# Patient Record
Sex: MALE | Race: WHITE | HISPANIC OR LATINO | Employment: FULL TIME | URBAN - METROPOLITAN AREA
[De-identification: names, ages, dates, MRNs, and addresses within clinical notes are randomized per-mention and may not be internally consistent; named-entity substitution may affect disease eponyms.]

---

## 2018-03-05 ENCOUNTER — HOSPITAL ENCOUNTER (INPATIENT)
Facility: CLINIC | Age: 58
LOS: 2 days | Discharge: HOME | End: 2018-03-07
Attending: EMERGENCY MEDICINE | Admitting: INTERNAL MEDICINE
Payer: COMMERCIAL

## 2018-03-05 ENCOUNTER — APPOINTMENT (OUTPATIENT)
Dept: CT IMAGING | Facility: CLINIC | Age: 58
End: 2018-03-05
Attending: EMERGENCY MEDICINE
Payer: COMMERCIAL

## 2018-03-05 DIAGNOSIS — K57.20 DIVERTICULITIS OF LARGE INTESTINE WITH PERFORATION, UNSPECIFIED BLEEDING STATUS: ICD-10-CM

## 2018-03-05 PROBLEM — K57.92 DIVERTICULITIS: Status: ACTIVE | Noted: 2018-03-05

## 2018-03-05 PROBLEM — A41.9 SEPSIS (H): Status: ACTIVE | Noted: 2018-03-05

## 2018-03-05 LAB
ALBUMIN SERPL-MCNC: 3.7 G/DL (ref 3.4–5)
ALP SERPL-CCNC: 67 U/L (ref 40–150)
ALT SERPL W P-5'-P-CCNC: 26 U/L (ref 0–70)
ANION GAP SERPL CALCULATED.3IONS-SCNC: 5 MMOL/L (ref 3–14)
AST SERPL W P-5'-P-CCNC: 19 U/L (ref 0–45)
BASOPHILS # BLD AUTO: 0 10E9/L (ref 0–0.2)
BASOPHILS NFR BLD AUTO: 0.2 %
BILIRUB SERPL-MCNC: 0.6 MG/DL (ref 0.2–1.3)
BUN SERPL-MCNC: 16 MG/DL (ref 7–30)
CALCIUM SERPL-MCNC: 8.7 MG/DL (ref 8.5–10.1)
CHLORIDE SERPL-SCNC: 104 MMOL/L (ref 94–109)
CO2 SERPL-SCNC: 28 MMOL/L (ref 20–32)
CREAT SERPL-MCNC: 0.76 MG/DL (ref 0.66–1.25)
DIFFERENTIAL METHOD BLD: ABNORMAL
EOSINOPHIL # BLD AUTO: 0.1 10E9/L (ref 0–0.7)
EOSINOPHIL NFR BLD AUTO: 0.8 %
ERYTHROCYTE [DISTWIDTH] IN BLOOD BY AUTOMATED COUNT: 12.8 % (ref 10–15)
GFR SERPL CREATININE-BSD FRML MDRD: >90 ML/MIN/1.7M2
GLUCOSE SERPL-MCNC: 104 MG/DL (ref 70–99)
HCT VFR BLD AUTO: 46.1 % (ref 40–53)
HGB BLD-MCNC: 15.7 G/DL (ref 13.3–17.7)
IMM GRANULOCYTES # BLD: 0.1 10E9/L (ref 0–0.4)
IMM GRANULOCYTES NFR BLD: 0.3 %
LIPASE SERPL-CCNC: 208 U/L (ref 73–393)
LYMPHOCYTES # BLD AUTO: 2.5 10E9/L (ref 0.8–5.3)
LYMPHOCYTES NFR BLD AUTO: 16.3 %
MCH RBC QN AUTO: 28.7 PG (ref 26.5–33)
MCHC RBC AUTO-ENTMCNC: 34.1 G/DL (ref 31.5–36.5)
MCV RBC AUTO: 84 FL (ref 78–100)
MONOCYTES # BLD AUTO: 1.7 10E9/L (ref 0–1.3)
MONOCYTES NFR BLD AUTO: 11.2 %
NEUTROPHILS # BLD AUTO: 10.7 10E9/L (ref 1.6–8.3)
NEUTROPHILS NFR BLD AUTO: 71.2 %
PLATELET # BLD AUTO: 323 10E9/L (ref 150–450)
POTASSIUM SERPL-SCNC: 3.6 MMOL/L (ref 3.4–5.3)
PROT SERPL-MCNC: 7.6 G/DL (ref 6.8–8.8)
RBC # BLD AUTO: 5.47 10E12/L (ref 4.4–5.9)
SODIUM SERPL-SCNC: 137 MMOL/L (ref 133–144)
WBC # BLD AUTO: 15 10E9/L (ref 4–11)

## 2018-03-05 PROCEDURE — 99285 EMERGENCY DEPT VISIT HI MDM: CPT | Mod: 25

## 2018-03-05 PROCEDURE — 80053 COMPREHEN METABOLIC PANEL: CPT | Performed by: EMERGENCY MEDICINE

## 2018-03-05 PROCEDURE — 25000125 ZZHC RX 250: Performed by: EMERGENCY MEDICINE

## 2018-03-05 PROCEDURE — 96374 THER/PROPH/DIAG INJ IV PUSH: CPT | Mod: 59

## 2018-03-05 PROCEDURE — 12000000 ZZH R&B MED SURG/OB

## 2018-03-05 PROCEDURE — 99222 1ST HOSP IP/OBS MODERATE 55: CPT | Mod: AI | Performed by: INTERNAL MEDICINE

## 2018-03-05 PROCEDURE — 25000128 H RX IP 250 OP 636: Performed by: INTERNAL MEDICINE

## 2018-03-05 PROCEDURE — 99207 ZZC CDG-MDM COMPONENT: MEETS LOW - DOWN CODED: CPT | Performed by: INTERNAL MEDICINE

## 2018-03-05 PROCEDURE — 25000128 H RX IP 250 OP 636: Performed by: EMERGENCY MEDICINE

## 2018-03-05 PROCEDURE — 85025 COMPLETE CBC W/AUTO DIFF WBC: CPT | Performed by: EMERGENCY MEDICINE

## 2018-03-05 PROCEDURE — 83690 ASSAY OF LIPASE: CPT | Performed by: EMERGENCY MEDICINE

## 2018-03-05 PROCEDURE — 74177 CT ABD & PELVIS W/CONTRAST: CPT

## 2018-03-05 RX ORDER — ACETAMINOPHEN 325 MG/1
650 TABLET ORAL EVERY 4 HOURS PRN
Status: DISCONTINUED | OUTPATIENT
Start: 2018-03-05 | End: 2018-03-07 | Stop reason: HOSPADM

## 2018-03-05 RX ORDER — OXYCODONE AND ACETAMINOPHEN 5; 325 MG/1; MG/1
1-2 TABLET ORAL EVERY 4 HOURS PRN
Status: DISCONTINUED | OUTPATIENT
Start: 2018-03-05 | End: 2018-03-07 | Stop reason: HOSPADM

## 2018-03-05 RX ORDER — IBUPROFEN 600 MG/1
600 TABLET, FILM COATED ORAL 3 TIMES DAILY PRN
Status: DISCONTINUED | OUTPATIENT
Start: 2018-03-05 | End: 2018-03-07 | Stop reason: HOSPADM

## 2018-03-05 RX ORDER — SODIUM CHLORIDE 9 MG/ML
INJECTION, SOLUTION INTRAVENOUS CONTINUOUS
Status: DISCONTINUED | OUTPATIENT
Start: 2018-03-05 | End: 2018-03-07 | Stop reason: HOSPADM

## 2018-03-05 RX ORDER — ONDANSETRON 2 MG/ML
4 INJECTION INTRAMUSCULAR; INTRAVENOUS EVERY 6 HOURS PRN
Status: DISCONTINUED | OUTPATIENT
Start: 2018-03-05 | End: 2018-03-07 | Stop reason: HOSPADM

## 2018-03-05 RX ORDER — ROSUVASTATIN CALCIUM 10 MG/1
10 TABLET, COATED ORAL DAILY
Status: DISCONTINUED | OUTPATIENT
Start: 2018-03-07 | End: 2018-03-07 | Stop reason: HOSPADM

## 2018-03-05 RX ORDER — ONDANSETRON 4 MG/1
4 TABLET, ORALLY DISINTEGRATING ORAL EVERY 6 HOURS PRN
Status: DISCONTINUED | OUTPATIENT
Start: 2018-03-05 | End: 2018-03-07 | Stop reason: HOSPADM

## 2018-03-05 RX ORDER — IOPAMIDOL 755 MG/ML
101 INJECTION, SOLUTION INTRAVASCULAR ONCE
Status: COMPLETED | OUTPATIENT
Start: 2018-03-05 | End: 2018-03-05

## 2018-03-05 RX ORDER — DOCUSATE SODIUM 100 MG/1
100 CAPSULE, LIQUID FILLED ORAL 2 TIMES DAILY
Status: DISCONTINUED | OUTPATIENT
Start: 2018-03-06 | End: 2018-03-07 | Stop reason: HOSPADM

## 2018-03-05 RX ORDER — PIPERACILLIN SODIUM, TAZOBACTAM SODIUM 3; .375 G/15ML; G/15ML
3.38 INJECTION, POWDER, LYOPHILIZED, FOR SOLUTION INTRAVENOUS EVERY 6 HOURS
Status: DISCONTINUED | OUTPATIENT
Start: 2018-03-06 | End: 2018-03-07 | Stop reason: HOSPADM

## 2018-03-05 RX ORDER — PIPERACILLIN SODIUM, TAZOBACTAM SODIUM 3; .375 G/15ML; G/15ML
3.38 INJECTION, POWDER, LYOPHILIZED, FOR SOLUTION INTRAVENOUS ONCE
Status: COMPLETED | OUTPATIENT
Start: 2018-03-05 | End: 2018-03-05

## 2018-03-05 RX ORDER — NALOXONE HYDROCHLORIDE 0.4 MG/ML
.1-.4 INJECTION, SOLUTION INTRAMUSCULAR; INTRAVENOUS; SUBCUTANEOUS
Status: DISCONTINUED | OUTPATIENT
Start: 2018-03-05 | End: 2018-03-07 | Stop reason: HOSPADM

## 2018-03-05 RX ORDER — MORPHINE SULFATE 2 MG/ML
2-4 INJECTION, SOLUTION INTRAMUSCULAR; INTRAVENOUS
Status: DISCONTINUED | OUTPATIENT
Start: 2018-03-05 | End: 2018-03-07 | Stop reason: HOSPADM

## 2018-03-05 RX ORDER — LISINOPRIL 10 MG/1
10 TABLET ORAL DAILY
Status: DISCONTINUED | OUTPATIENT
Start: 2018-03-07 | End: 2018-03-07 | Stop reason: HOSPADM

## 2018-03-05 RX ADMIN — SODIUM CHLORIDE 71 ML: 9 INJECTION, SOLUTION INTRAVENOUS at 20:41

## 2018-03-05 RX ADMIN — SODIUM CHLORIDE: 9 INJECTION, SOLUTION INTRAVENOUS at 22:38

## 2018-03-05 RX ADMIN — IOPAMIDOL 101 ML: 755 INJECTION, SOLUTION INTRAVENOUS at 20:41

## 2018-03-05 RX ADMIN — PIPERACILLIN SODIUM,TAZOBACTAM SODIUM 3.38 G: 3; .375 INJECTION, POWDER, FOR SOLUTION INTRAVENOUS at 21:48

## 2018-03-05 ASSESSMENT — ACTIVITIES OF DAILY LIVING (ADL)
TRANSFERRING: 0-->INDEPENDENT
TOILETING: 0-->INDEPENDENT
DRESS: 0-->INDEPENDENT
BATHING: 0-->INDEPENDENT
AMBULATION: 0-->INDEPENDENT
RETIRED_COMMUNICATION: 0-->UNDERSTANDS/COMMUNICATES WITHOUT DIFFICULTY
COGNITION: 0 - NO COGNITION ISSUES REPORTED
FALL_HISTORY_WITHIN_LAST_SIX_MONTHS: NO
SWALLOWING: 0-->SWALLOWS FOODS/LIQUIDS WITHOUT DIFFICULTY
RETIRED_EATING: 0-->INDEPENDENT

## 2018-03-05 ASSESSMENT — ENCOUNTER SYMPTOMS
NAUSEA: 0
CHILLS: 1
VOMITING: 0
HEMATURIA: 0
DYSURIA: 0
ABDOMINAL PAIN: 1
FEVER: 1

## 2018-03-05 NOTE — IP AVS SNAPSHOT
MRN:[id number]                      After Visit Summary   3/5/2018    Claude Hayes    MRN: [id number]           Thank you!     Thank you for choosing [facility] for your care. Our goal is always to provide you with excellent care. Hearing back from our patients is one way we can continue to improve our services. Please take a few minutes to complete the written survey that you may receive in the mail after you visit with us. Thank you!        Patient Information     Date Of Birth          1960        Designated Caregiver       Most Recent Value    Caregiver    Will someone help with your care after discharge? yes    Name of designated caregiver Nelida    Phone number of caregiver [phone]    Caregiver address [address] [place]      About your hospital stay     You were admitted on:  March 5, 2018 You last received care in the:  [facility] [address] [facility]    You were discharged on:  March 7, 2018        Reason for your hospital stay       Diverticulitis                  Who to Call     For medical emergencies, please call 911.  For non-urgent questions about your medical care, please call your primary care provider or clinic, [phone]          Attending Provider     Provider Specialty    Bhavani Loya MD Emergency Medicine    [facility], Steffen Morrow MD Internal Medicine       Primary Care Provider Office Phone # Fax #    Alexander Grady [phone]      After Care Instructions     Activity       Your activity upon discharge: activity as tolerated            Diet       Follow this diet upon discharge: Orders Placed This Encounter      Full Liquid Diet and advance as tolerated.  Recommend low fiber diet                  Follow-up Appointments     Follow-up and recommended labs and tests        Follow up with primary care provider, Alexander Grady, within 2-4 weeks, for hospital follow- up. No follow up labs or test are  "needed.    Should follow-up with surgery as needed                  Pending Results     No orders found from 3/3/2018 to 3/6/2018.            Statement of Approval     Ordered          18 1235  I have reviewed and agree with all the recommendations and orders detailed in this document.  EFFECTIVE NOW     Approved and electronically signed by:  Simone Enriquez DO             Admission Information     Date & Time Provider Department Dept. Phone    3/5/2018 Steffen Dunn MD [place] [address] [facility] [phone]      Your Vitals Were     Blood Pressure Temperature Respirations Height Weight Pulse Oximetry    129/81 (BP Location: Left arm) 97.6  F (36.4  C) (Oral) 16 1.778 m (5' 10\") 90.7 kg (200 lb) 95%    BMI (Body Mass Index)                   28.7 kg/m2           MyChart Information     [facility] lets you send messages to your doctor, view your test results, renew your prescriptions, schedule appointments and more. To sign up, go to [email or web address] . Click on \"Log in\" on the left side of the screen, which will take you to the Welcome page. Then click on \"Sign up Now\" on the right side of the page.     You will be asked to enter the access code listed below, as well as some personal information. Please follow the directions to create your username and password.     Your access code is: [email or web address]  Expires: 2018 12:55 PM     Your access code will  in 90 days. If you need help or a new code, please call your [facility] clinic or [phone].        Care EveryWhere ID     This is your Care EveryWhere ID. This could be used by other organizations to access your [facility] medical records  [id number]        Equal Access to Services     WAYNE LEE : Tomasz Schulz, per telles, rufino georges, fuentes tyler. So [facility] [phone].    ATENCIÓN: Si habla español, tiene a smith disposición servicios gratuitos " de asistencia lingüística. Arturo olivas [phone].    We comply with applicable federal civil rights laws and [place] laws. We do not discriminate on the basis of race, color, national origin, age, disability, sex, sexual orientation, or gender identity.               Review of your medicines      START taking        Dose / Directions    ciprofloxacin 500 MG tablet   Commonly known as:  CIPRO   Used for:  Diverticulitis of large intestine with perforation, unspecified bleeding status        Dose:  500 mg   Take 1 tablet (500 mg) by mouth 2 times daily   Quantity:  20 tablet   Refills:  0       metroNIDAZOLE 500 MG tablet   Commonly known as:  FLAGYL   Used for:  Diverticulitis of large intestine with perforation, unspecified bleeding status        Dose:  500 mg   Take 1 tablet (500 mg) by mouth 3 times daily   Quantity:  30 tablet   Refills:  0         CONTINUE these medicines which have NOT CHANGED        Dose / Directions    ASPIRIN PO        Dose:  81 mg   Take 81 mg by mouth daily   Refills:  0       CRESTOR PO        Dose:  10 mg   Take 10 mg by mouth daily   Refills:  0       PRINIVIL PO        Dose:  10 mg   Take 10 mg by mouth daily   Refills:  0            Where to get your medicines      These medications were sent to [facility] [place], [place] - [address]  [address] [facility] [address], [place] [address]     Phone:  [phone]     ciprofloxacin 500 MG tablet    metroNIDAZOLE 500 MG tablet                Protect others around you: Learn how to safely use, store and throw away your medicines at www.disposemymeds.org.        ANTIBIOTIC INSTRUCTION     You've Been Prescribed an Antibiotic - Now What?  Your healthcare team thinks that you or your loved one might have an infection. Some infections can be treated with antibiotics, which are powerful, life-saving drugs. Like all medications, antibiotics have side effects and should only be used when necessary. There are some important things you  should know about your antibiotic treatment.      Your healthcare team may run tests before you start taking an antibiotic.    Your team may take samples (e.g., from your blood, urine or other areas) to run tests to look for bacteria. These test can be important to determine if you need an antibiotic at all and, if you do, which antibiotic will work best.      Within a few days, your healthcare team might change or even stop your antibiotic.    Your team may start you on an antibiotic while they are working to find out what is making you sick.    Your team might change your antibiotic because test results show that a different antibiotic would be better to treat your infection.    In some cases, once your team has more information, they learn that you do not need an antibiotic at all. They may find out that you don't have an infection, or that the antibiotic you're taking won't work against your infection. For example, an infection caused by a virus can't be treated with antibiotics. Staying on an antibiotic when you don't need it is more likely to be harmful than helpful.      You may experience side effects from your antibiotic.    Like all medications, antibiotics have side effects. Some of these can be serious.    Let you healthcare team know if you have any known allergies when you are admitted to the hospital.    One significant side effect of nearly all antibiotics is the risk of severe and sometimes deadly diarrhea caused by Clostridium difficile (C. Difficile). This occurs when a person takes antibiotics because some good germs are destroyed. Antibiotic use allows C. diificile to take over, putting patients at high risk for this serious infection.    As a patient or caregiver, it is important to understand your or your loved one's antibiotic treatment. It is especially important for caregivers to speak up when patients can't speak for themselves. Here are some important questions to ask your healthcare  team.    What infection is this antibiotic treating and how do you know I have that infection?    What side effects might occur from this antibiotic?    How long will I need to take this antibiotic?    Is it safe to take this antibiotic with other medications or supplements (e.g., vitamins) that I am taking?     Are there any special directions I need to know about taking this antibiotic? For example, should I take it with food?    How will I be monitored to know whether my infection is responding to the antibiotic?    What tests may help to make sure the right antibiotic is prescribed for me?      Information provided by:  www.cdc.gov/getsmart  U.S. Department of Health and Human Services  Centers for disease Control and Prevention  National Center for Emerging and Zoonotic Infectious Diseases  Division of Healthcare Quality Promotion             Medication List: This is a list of all your medications and when to take them. Check marks below indicate your daily home schedule. Keep this list as a reference.      Medications           Morning Afternoon Evening Bedtime As Needed    ASPIRIN PO   Take 81 mg by mouth daily                                ciprofloxacin 500 MG tablet   Commonly known as:  CIPRO   Take 1 tablet (500 mg) by mouth 2 times daily                                CRESTOR PO   Take 10 mg by mouth daily   Last time this was given:  10 mg on 3/7/2018  7:59 AM                                metroNIDAZOLE 500 MG tablet   Commonly known as:  FLAGYL   Take 1 tablet (500 mg) by mouth 3 times daily                                PRINIVIL PO   Take 10 mg by mouth daily   Last time this was given:  10 mg on 3/7/2018  7:58 AM                                          More Information      Eating a Low-fiber Diet  What is fiber?  Fiber is the part of food that the body cannot digest. It helps form stools (bowel movements).   If you eat less fiber, you may:    Reduce belly pain, diarrhea (loose, watery stools)  and other digestive problems    Have fewer and smaller stools    Decrease inflammation (pain, redness and swelling) in the GI (gastro-intestinal) tract    Promote healing in the GI tract.  For a list of foods allowed in a low-fiber diet, see the back of this page.  Why might I need a low-fiber diet?  You may need a low-fiber diet if you have:     Inflamed bowels    Crohn's disease    Diverticular disease    Ulcerative colitis    Radiation therapy to the belly area    Chemotherapy    An upcoming colonoscopy    Surgery on your intestines or in the belly area.  Sample Menu  Breakfast:   1 scrambled egg   1 slice white toast with 1 teaspoon margarine     cup Cream of Wheat with sugar     cup milk      cup pulp-free orange juice  Snack:     cup canned fruit cocktail (in juice)   6 saltine crackers  Lunch:   Tuna sandwich on white bread    1 cup cream of chicken soup     cup canned peaches (in light syrup)   1 cup lemonade  Snack:     cup cottage cheese   1 medium apple, sliced and peeled  Dinner:   3 ounces well-cooked chicken breast   1 cup white rice     cup cooked canned carrots   1 white dinner roll with 1 teaspoon margarine   1 slice padmaja food cake   1 cup herbal tea  Food group Allowed Avoid   Grains Foods that contain refined white flour   (1 gram fiber or less per serving), such as bread, pasta, muffins, cereals, crackers, etc.; white rice; Cream of Wheat; Cream of Rice Whole grains (whole wheat bread, oatmeal, barley, brown or wild rice); foods containing nuts, seeds or bran   Vegetables Canned or well-cooked vegetables; mashed potatoes; non-gas-forming vegetables; vegetables without skin, seeds or pulp; vegetable juice ( 1/2 cup per day or less) Raw vegetables; cooked greens or spinach;   gas-forming vegetables (broccoli, cauliflower, brussels sprouts)   Fruits Peeled fresh fruit (bananas, apples, melons, nectarines); canned fruit (in juice or light syrup); fruit juice without pulp Dried fruit; fruit with pulp  "(oranges, grapefruit, pineapple); unpeeled fruit; prune juice   Meats and   other proteins Tender, well-cooked or ground meats; fish; eggs; tofu; smooth nut butters (peanut, soy, almond, sunflower) Crunchy nut butters; tough meats; meats with gristle (meza, sausage); dried beans or peas (legumes)   Milk products Milk, soy milk, rice milk, almond milk, coconut milk; yogurt, soy yogurt; cottage cheese, mild cheese; ice cream, sherbet If you are lactose intolerant: avoid milk, dairy products and foods made with milk  Note: Some people become lactose intolerant after surgery. This may or may not improve over time.   Other Salad dressings; oil, butter, margarine; jelly, honey, syrup Any food containing nuts or seeds; coconut; marmalade; carbonated (\"fizzy\") drinks   For informational purposes only. Not to replace the advice of your health care provider.   Copyright   2007 [facility]. All rights reserved. [employer] 745520 - REV 09/15.          Diet for Diverticular Disease  What is diverticular disease?   When the inner wall of your colon weakens and forms small pouches, you have diverticulosis. For most people, this causes no symptoms.   If the pouches become inflamed or infected, you have diverticulitis. Warning signs may include pain in the lower abdomen, chills and vomiting (throwing up).  These conditions are called diverticular disease.  What causes it?  The cause has been linked to many years of eating too little fiber. People who eat plenty of whole grains, fresh fruits and vegetables are less likely to get this disease.   Once the pouches have formed, there is no way to reverse them.   How much fiber should I get?  If you're healing from diverticulitis or surgery to remove the inflamed area, you will at first follow a low-fiber diet (less than 10 grams each day). Then, as your doctor advises, you may slowly add fiber. Increase your intake by 1 to 2 grams a day. Your goal will be 25 to 30 grams a " day.   To avoid future problems, continue to get 25 to 30 grams of fiber each day.   How can fiber help?   A high-fiber diet will help you have regular bowel movements. Fiber adds bulk to the stool and helps it pass more easily. Fiber also helps prevent the pouches from growing larger or getting infected.  In the past, doctors have warned patients to avoid eating nuts, seeds and popcorn. They believed these foods could get caught in the pouches and inflame them. But recent studies do not support this idea.   Please ask your dietitian for the handout Fiber Content in Common Foods. It will show you how to get more fiber in your diet.  For informational purposes only. Not to replace the advice of your health care provider.   Copyright   2007 [facility]. All rights reserved. Dot VN 515268 - REV 09/15.

## 2018-03-05 NOTE — IP AVS SNAPSHOT
[facility] [address] [facility]    [address] [place] [address]    Phone:  [phone]                                       After Visit Summary   3/5/2018    Claude Hayes    MRN: [id number]           After Visit Summary Signature Page     I have received my discharge instructions, and my questions have been answered. I have discussed any challenges I see with this plan with the nurse or doctor.    ..........................................................................................................................................  Patient/Patient Representative Signature      ..........................................................................................................................................  Patient Representative Print Name and Relationship to Patient    ..................................................               ................................................  Date                                            Time    ..........................................................................................................................................  Reviewed by Signature/Title    ...................................................              ..............................................  Date                                                            Time

## 2018-03-06 LAB
ANION GAP SERPL CALCULATED.3IONS-SCNC: 7 MMOL/L (ref 3–14)
BUN SERPL-MCNC: 13 MG/DL (ref 7–30)
CALCIUM SERPL-MCNC: 8.5 MG/DL (ref 8.5–10.1)
CHLORIDE SERPL-SCNC: 106 MMOL/L (ref 94–109)
CO2 SERPL-SCNC: 26 MMOL/L (ref 20–32)
CREAT SERPL-MCNC: 0.83 MG/DL (ref 0.66–1.25)
ERYTHROCYTE [DISTWIDTH] IN BLOOD BY AUTOMATED COUNT: 12.9 % (ref 10–15)
GFR SERPL CREATININE-BSD FRML MDRD: >90 ML/MIN/1.7M2
GLUCOSE SERPL-MCNC: 91 MG/DL (ref 70–99)
HCT VFR BLD AUTO: 44.7 % (ref 40–53)
HGB BLD-MCNC: 15.2 G/DL (ref 13.3–17.7)
MCH RBC QN AUTO: 29 PG (ref 26.5–33)
MCHC RBC AUTO-ENTMCNC: 34 G/DL (ref 31.5–36.5)
MCV RBC AUTO: 85 FL (ref 78–100)
PLATELET # BLD AUTO: 324 10E9/L (ref 150–450)
POTASSIUM SERPL-SCNC: 3.9 MMOL/L (ref 3.4–5.3)
RBC # BLD AUTO: 5.24 10E12/L (ref 4.4–5.9)
SODIUM SERPL-SCNC: 139 MMOL/L (ref 133–144)
WBC # BLD AUTO: 13.3 10E9/L (ref 4–11)

## 2018-03-06 PROCEDURE — 25000128 H RX IP 250 OP 636: Performed by: INTERNAL MEDICINE

## 2018-03-06 PROCEDURE — 99232 SBSQ HOSP IP/OBS MODERATE 35: CPT | Performed by: INTERNAL MEDICINE

## 2018-03-06 PROCEDURE — 85027 COMPLETE CBC AUTOMATED: CPT | Performed by: INTERNAL MEDICINE

## 2018-03-06 PROCEDURE — 36415 COLL VENOUS BLD VENIPUNCTURE: CPT | Performed by: INTERNAL MEDICINE

## 2018-03-06 PROCEDURE — 99221 1ST HOSP IP/OBS SF/LOW 40: CPT | Performed by: SURGERY

## 2018-03-06 PROCEDURE — 80048 BASIC METABOLIC PNL TOTAL CA: CPT | Performed by: INTERNAL MEDICINE

## 2018-03-06 PROCEDURE — 12000000 ZZH R&B MED SURG/OB

## 2018-03-06 RX ADMIN — PIPERACILLIN SODIUM,TAZOBACTAM SODIUM 3.38 G: 3; .375 INJECTION, POWDER, FOR SOLUTION INTRAVENOUS at 22:39

## 2018-03-06 RX ADMIN — PIPERACILLIN SODIUM,TAZOBACTAM SODIUM 3.38 G: 3; .375 INJECTION, POWDER, FOR SOLUTION INTRAVENOUS at 15:11

## 2018-03-06 RX ADMIN — SODIUM CHLORIDE: 9 INJECTION, SOLUTION INTRAVENOUS at 05:32

## 2018-03-06 RX ADMIN — PIPERACILLIN SODIUM,TAZOBACTAM SODIUM 3.38 G: 3; .375 INJECTION, POWDER, FOR SOLUTION INTRAVENOUS at 03:59

## 2018-03-06 RX ADMIN — PIPERACILLIN SODIUM,TAZOBACTAM SODIUM 3.38 G: 3; .375 INJECTION, POWDER, FOR SOLUTION INTRAVENOUS at 09:24

## 2018-03-06 NOTE — H&P
[facility]    History and Physical  Hospitalist       Date of Admission:  3/5/2018    Assessment & Plan   Claude Hayes is a 57 year old male who presents with:    Summary:    Principal Problem:    Perforation of sigmoid diverticulitis, appears contained   -- IV fluids   -- IV Zosyn   -- pain meds as needed   -- check WBC in AM  Active Problems:    Sepsis (WBC 15.0, Temp 101.0)       Plan: Will contact general surgery to follow, but hopefully patient will be able to be managed with conservative therapy.  Discussed clear liquids tomorrow if better, and gradually advance diet if remains stable with 1-2 weeks low fiber diet, then resume normal high fiber diet.      DVT Prophylaxis: Pneumatic Compression Devices  Code Status: Full Code    Disposition: Expected discharge in 2 days once abdominal pain better and bowels moving.    Steffen Anders MD  Pager: [phone]  Cell Phone:  [phone]       Primary Care Physician   Alexander Grady    Chief Complaint   Abdominal pain     History is obtained from Patient    History of Present Illness    57 year old male hypertension and elevated cholesterol but otherwise healthy who presents with abdominal pain that started 2 to 3 days ago, mild subjective fever with chills, and today recorded temp of 101.0, slight decreased appetite, no vomiting, is still passing gas but no BM today, and last ate yogurt at 3 PM today -- lab shows WBC 15.0 and abdominal CT shows small localized perforated sigmoid diverticulitis.  He did have colonoscopy at age 49 (8 years ago), and was told one polyp but did not hear diverticulosis mentioned.  This is the first episode he has had, and normally has 1 to 2 BM's a day (not constipated), and is not under a lot of stress.     Past Medical History    I have reviewed this patient's medical history and updated it with pertinent information if needed.   Past Medical History:   Diagnosis Date     Elevated cholesterol       Hypertension        Past Surgical History   I have reviewed this patient's surgical history and updated it with pertinent information if needed.  Past Surgical History:   Procedure Laterality Date     TONSILLECTOMY         Prior to Admission Medications   Prior to Admission Medications   Prescriptions Last Dose Informant Patient Reported? Taking?   ASPIRIN PO 3/5/2018 at am Self Yes Yes   Sig: Take 81 mg by mouth daily   Lisinopril (PRINIVIL PO) 3/5/2018 at am Pharmacy Yes Yes   Sig: Take 10 mg by mouth daily    Rosuvastatin Calcium (CRESTOR PO) 3/5/2018 at am Pharmacy Yes Yes   Sig: Take 10 mg by mouth daily       Facility-Administered Medications: None     Allergies   No Known Allergies    Social History   I have reviewed this patient's social history and updated it with pertinent information if needed. Claude Hayes  reports that he has never smoked. He has never used smokeless tobacco. He reports that he drinks alcohol. He reports that he does not use illicit drugs.    Family History   I have reviewed this patient's family history and updated it with pertinent information if needed.   Family History   Problem Relation Age of Onset     Other - See Comments Mother      Old age,  age 98     HEART DISEASE Father      CABG age 54       Review of Systems   The 10 point Review of Systems is negative other than noted in the HPI or here.  Is urinating a lot today but wife has been making drink more fluids.     # Pain Assessment:   Current Pain Score 3/5/2018   Pain score (0-10) 4   - Claude is experiencing pain due to perforated diverticulitis. Pain management was discussed with Claude and his spouse and the plan was created in a collaborative fashion.  Claude's response to the current recommendations: engaged  - Pharmacologic adjuvants: NSAIDs, Acetaminophen and Narcotics as needed.         Physical Exam   Temp: 98.6  F (37  C) Temp src: Temporal BP: (!) 143/91   Heart Rate: 81 Resp: 16 SpO2: 95 % O2 Device: None  (Room air)    Vital Signs with Ranges  Temp:  [98.6  F (37  C)] 98.6  F (37  C)  Heart Rate:  [81] 81  Resp:  [16] 16  BP: (143-158)/(82-96) 143/91  SpO2:  [95 %-96 %] 95 %  200 lbs 0 oz    Constitutional: Awake, alert, cooperative, no apparent distress.  Eyes: Conjunctiva and pupils examined and normal.  HEENT: Moist mucous membranes, normal dentition.  Respiratory: Clear to auscultation bilaterally, no crackles or wheezing.  Cardiovascular: Regular rate and rhythm, normal S1 and S2, and no murmur noted,   No carotid bruits.  no ankle edema.   GI: Soft, non-distended, mild diffuse tenderness, with moderate tenderness in LLQ and suprapubic area, bowel sounds present but decreased.  Lymph/Hematologic: No anterior cervical, supraclavicular or axillary adenopathy.  Skin: No rashes, no cyanosis.   Musculoskeletal: No joint swelling, erythema or tenderness.  Neurologic: Cranial nerves 2-12 intact, no focal weakness or numbness  Psychiatric: Alert, Ox3, no obvious anxiety or depression.    Data   Data reviewed today:  No EKG today    Recent Labs  Lab 03/05/18  1949   WBC 15.0*   HGB 15.7   MCV 84         POTASSIUM 3.6   CHLORIDE 104   CO2 28   BUN 16   CR 0.76   ANIONGAP 5   ANNA 8.7   *   ALBUMIN 3.7   PROTTOTAL 7.6   BILITOTAL 0.6   ALKPHOS 67   ALT 26   AST 19   LIPASE 208       Imaging:  Recent Results (from the past 24 hour(s))   CT Abdomen Pelvis w Contrast    Narrative    CT ABDOMEN PELVIS W CONTRAST 3/5/2018 8:44 PM    TECHNIQUE: Images from diaphragm to pubic symphysis 101 mL Isovue -370  IV contrast.  Radiation dose for this scan was reduced using automated exposure  control, adjustment of the mA and/or kV according to patient size, or  iterative reconstruction technique.    HISTORY: lower abdomen pain with rebound.  Left and pelvic worse than  right;     COMPARISON: None.    FINDINGS:   Abdomen and Pelvis: Colon bowel wall thickening and pericolonic  stranding involving the sigmoid colon  consistent with acute  diverticulitis. No evidence for abscess. There is a 0.3 cm gas bubble  along the posterior margin of the sigmoid colon which could be a small  localized area of perforation. Normal appendix.    Discoid atelectasis is at the lung bases, lingula and medial right  middle lobe. Several small low dense liver lesions, some are too small  to characterize. The largest lesions consistent with cysts.  Normal-appearing gallbladder, spleen, pancreas, adrenal glands and  kidneys. Tiny fat-containing umbilical hernia.      Impression    IMPRESSION :  1. Sigmoid diverticulitis with suspected tiny localized area of  perforation but no evidence for abscess.  2. Numerous hypodense liver lesions including cysts and some small  lesions too small to characterize which could be very small cysts.    MARIA A CANO MD

## 2018-03-06 NOTE — CONSULTS
General Surgery [facility] Consultation/H&P    Claude Hayes MRN#: [id number]   Age: 57 year old YOB: 1960     Date of Admission:          3/5/2018  Reason for consult/H&P: Diverticular disease   Surgeon:      Wiley Butt MD                  Chief Complaint:   Abdominal pain         History of Present Illness:   This patient is a 57 year old  male who presented to the [facility] ER with fevers chills and abdominal pain. The pain became worse yesterday and he went to the emergency room.  He has had this pain for several days.   He has had no trauma to the abdomen.  He did have some fevers and chills.. Denies , nausea, vomiting, change in BM or urination. Today he feels much better.  He has not had similar spells of pain and fevers.   He is generally quite healthy.  Denies having any previous episodes or abdominal surgery. History is obtained from the patient and chart.         Past Medical History:    has a past medical history of Elevated cholesterol and Hypertension.          Past Surgical History:     Past Surgical History:   Procedure Laterality Date     TONSILLECTOMY  1966            Medications:     Prior to Admission medications    Medication Sig Start Date End Date Taking? Authorizing Provider   Rosuvastatin Calcium (CRESTOR PO) Take 10 mg by mouth daily    Yes Reported, Patient   Lisinopril (PRINIVIL PO) Take 10 mg by mouth daily    Yes Unknown, Entered By History   ASPIRIN PO Take 81 mg by mouth daily   Yes Unknown, Entered By History            Allergies:   No Known Allergies         Social History:     Social History   Substance Use Topics     Smoking status: Never Smoker     Smokeless tobacco: Never Used     Alcohol use Yes      Comment: 1 drink a week             Family History:    This patient has no significant relevant family history.  Family history is reviewed in detail.          Review of Systems:   Brief ROS is negative other than noted in the HPI.  C: NEGATIVE  "for fever, chills, change in weight  R: NEGATIVE for significant cough or SOB  CV: NEGATIVE for chest pain, palpitations or peripheral edema  GI:  NEGATIVE for dysuria, heartburn, or change in bowel habits  H: NEGATIVE for bleeding problems         Physical Exam:   Blood pressure 125/80, temperature 98.2  F (36.8  C), temperature source Oral, resp. rate 16, height 1.778 m (5' 10\"), weight 90.7 kg (200 lb), SpO2 97 %.  I/O last 3 completed shifts:  In: 1927 [I.V.:1927]  Out: -     General - This is a well developed, well nourished male .  HEENT - Normocephalic. Atraumatic. Moist mucous membranes. Pupils equal.  No scleral icterus. Nose normal.  Neck - Supple without masses. No cervical adenopathy or thyromegaly  Lungs - Breathing not labored  Chest - Not tender. CVA's nontender  Heart - Regular rate & rhythm   Abdomen - Soft, nontender except in the lower abdomen.  He is moderately tender on both sides. nondistended with +bowel sounds, no organomegaly.  Extremities - Moves all extremities. Warm without edema.  Neurologic - Nonfocal.          Data:   Labs:  Recent Labs   Lab Test  03/06/18 0945 03/05/18 1949   WBC  13.3*  15.0*   HGB  15.2  15.7   HCT  44.7  46.1   PLT  324  323     Recent Labs   Lab Test  03/06/18 0945 03/05/18 1949   POTASSIUM  3.9  3.6   CHLORIDE  106  104   CO2  26  28   BUN  13  16   CR  0.83  0.76     Recent Labs   Lab Test  03/05/18 1949   BILITOTAL  0.6   ALT  26   AST  19   ALKPHOS  67   LIPASE  208     No lab results found.  Recent Labs   Lab Test  03/06/18 0945  03/05/18 1949   ANNA  8.5  8.7     Recent Labs   Lab Test  03/06/18 0945 03/05/18 1949   ANIONGAP  7  5   ALBUMIN   --   3.7       CT scan of the abdomen: images reviewed in detail.  diverticular disease without  Free perforation.    Ultrasound of the abdomen: not done                 Assessment:   First attack of severe acute diverticulitis.  No free perforation.         Plan:    he is improving rapidly.  He " should be able to go home on oral antibiotics soon.  We can start clear liquids today.       I have discussed the history, physical, and plan with the patient.   Wiley Butt M.D.

## 2018-03-06 NOTE — PLAN OF CARE
Problem: Patient Care Overview  Goal: Plan of Care/Patient Progress Review  Outcome: Improving  Pt states he has mild lower mid abd tenderness, rates it at a 2 level, denies nausea, states he is looking forward to clear liquids. Had one loose and pt described as explosive stool, colace held. Has been walking in halls. VSS.

## 2018-03-06 NOTE — PROGRESS NOTES
RECEIVING UNIT ED HANDOFF REVIEW    ED Nurse Handoff Report was reviewed by: Laisha Tee on March 5, 2018 at 9:53 PM

## 2018-03-06 NOTE — PROGRESS NOTES
[facility]    Hospitalist Progress Note    Assessment & Plan     57 year old male hypertension and elevated cholesterol but otherwise healthy who presented with abdominal pain that started 2 to 3 days ago, mild subjective fever with chills, and today recorded temp of 101.0, slight decreased appetite, no vomiting, is still passing gas but no BM today, and last ate yogurt at 3 PM today -- lab shows WBC 15.0 and abdominal CT shows small localized perforated sigmoid diverticulitis.  He did have colonoscopy at age 49 (8 years ago), and was told one polyp but did not hear diverticulosis mentioned.  This is the first episode he has had, and normally has 1 to 2 BM's a day (not constipated), and is not under a lot of stress.   He was admitted to the hospital on 3/5/18.       Principal Problem:    Perforation of sigmoid diverticulitis, appears contained.  He presented with fever and lower abdominal pain for 2-3 days.   Today; clinically improved. Nl vitals. Afebrile. Wbc down to 13. Significant improvement in abd pain. Ambulating.  + appetie                        -- IV fluids- decrease rate                        -- IV Zosyn then transition to po abx                         -- pain meds as needed                        -- check WBC/bmp in AM    -- surgery consulted. Appreciate input.      -start clear liquids. Call MD if significant worsening of abd pain as will need urgent CT abd    -will need repeat colonoscopy in 6-8 weeks to rule out underlying lesion. Discussed with patient. Follow up with primary MD 1 week following discharge.     Liver lesions: numerous hypodense liver lesions including cysts and some lesions too small to characterize. LFTs normal.  No hx of malignancy. Discussed with radiology and findings c/w cysts and no further imaging recommended. Per radiology, further imaging only if there was suspicion or know underlying malignancy. No concern for underlying malignancy at this  time.       Active Problems:    Sepsis (WBC 15.0, Temp 101.0)       DVT Prophylaxis: Pneumatic Compression Devices, pt is ambulating regularly    Code Status: Full Code    Disposition: Expected discharge in 1-2 days- possibly 3/7/18.     Jesus Knapp MD  Text Page  (7am to 6pm)  Interval History      He is feeling better. No fever, chills. abd pain much improved. Has been up walking around. + appetite.  No new issues.   Last colonoscopy 7years ago.  Denies chronic, subacute GI symptoms other than sense of not feeling he fully evacuates stool for last several months. No bloody stools.     -Data reviewed today: I reviewed all new labs and imaging results over the last 24 hours. I personally reviewed .    Physical Exam   Temp: 98.2  F (36.8  C) Temp src: Oral BP: 125/80   Heart Rate: 80 Resp: 16 SpO2: 97 % O2 Device: None (Room air)    Vitals:    03/05/18 1937   Weight: 90.7 kg (200 lb)     Vital Signs with Ranges  Temp:  [98.2  F (36.8  C)-99.5  F (37.5  C)] 98.2  F (36.8  C)  Heart Rate:  [80-89] 80  Resp:  [16-17] 16  BP: (125-158)/(80-96) 125/80  SpO2:  [93 %-97 %] 97 %  I/O last 3 completed shifts:  In: 1927 [I.V.:1927]  Out: -     Constitutional: Alert, oriented. Nontoxic, appears well/comfortable  Respiratory: CTAB  Cardiovascular: RRR, no r/g/m  GI: normal BS, soft, ND. Mild tenderness without guarding in LLQ/lower mid abdomen  Skin/Integumen: no rash, no edema.   Psych: nl affect    Medications     sodium chloride 150 mL/hr at 03/06/18 0532       [START ON 3/7/2018] lisinopril (PRINIVIL/ZESTRIL) tablet 10 mg  10 mg Oral Daily     [START ON 3/7/2018] rosuvastatin (CRESTOR) tablet 10 mg  10 mg Oral Daily     docusate sodium  100 mg Oral BID     piperacillin-tazobactam  3.375 g Intravenous Q6H       Data     Recent Labs  Lab 03/06/18  0945 03/05/18 1949   WBC 13.3* 15.0*   HGB 15.2 15.7   MCV 85 84    323    137   POTASSIUM 3.9 3.6   CHLORIDE 106 104   CO2 26 28   BUN 13 16   CR 0.83 0.76    ANIONGAP 7 5   ANNA 8.5 8.7   GLC 91 104*   ALBUMIN  --  3.7   PROTTOTAL  --  7.6   BILITOTAL  --  0.6   ALKPHOS  --  67   ALT  --  26   AST  --  19   LIPASE  --  208       Imaging:   Recent Results (from the past 24 hour(s))   CT Abdomen Pelvis w Contrast    Narrative    CT ABDOMEN PELVIS W CONTRAST 3/5/2018 8:44 PM    TECHNIQUE: Images from diaphragm to pubic symphysis 101 mL Isovue -370  IV contrast.  Radiation dose for this scan was reduced using automated exposure  control, adjustment of the mA and/or kV according to patient size, or  iterative reconstruction technique.    HISTORY: lower abdomen pain with rebound.  Left and pelvic worse than  right;     COMPARISON: None.    FINDINGS:   Abdomen and Pelvis: Colon bowel wall thickening and pericolonic  stranding involving the sigmoid colon consistent with acute  diverticulitis. No evidence for abscess. There is a 0.3 cm gas bubble  along the posterior margin of the sigmoid colon which could be a small  localized area of perforation. Normal appendix.    Discoid atelectasis is at the lung bases, lingula and medial right  middle lobe. Several small low dense liver lesions, some are too small  to characterize. The largest lesions consistent with cysts.  Normal-appearing gallbladder, spleen, pancreas, adrenal glands and  kidneys. Tiny fat-containing umbilical hernia.      Impression    IMPRESSION :  1. Sigmoid diverticulitis with suspected tiny localized area of  perforation but no evidence for abscess.  2. Numerous hypodense liver lesions including cysts and some small  lesions too small to characterize which could be very small cysts.    MARIA A CANO MD

## 2018-03-06 NOTE — ED PROVIDER NOTES
"  History     Chief Complaint:  Abdominal pain     HPI   Claude Hayes is a 57 year old male who presents for evaluation of abdominal pain. The patient states he has been experiencing abdominal pain for the past 3 days with subjective fever and chills. The abdominal pain initially started in his lower abdomen but became more diffuse. He was able to measure a temperature of 101F at home today. Here, the patient complains of continued abdominal pain but denies nausea, vomiting, dysuria, hematuria, or any additional symptoms.      Allergies:  No known drug allergies     Medications:    Crestor    Past Medical History:    Hyperlipidemia   Hypertension     Past Surgical History:    The patient does not have any past pertinent medical history.     Family History:    History reviewed. No pertinent family history.      Social History:  Smoking status: Never smoker  Alcohol use: Occasional use      Review of Systems   Constitutional: Positive for chills and fever (101F).   Gastrointestinal: Positive for abdominal pain. Negative for nausea and vomiting.   Genitourinary: Negative for dysuria and hematuria.   10 point review of systems performed and is negative except as above and in HPI.    Physical Exam   First Vitals:   BP Temp Temp src Heart Rate Resp SpO2 Height Weight   03/05/18 2100 (!) 143/91 - - - - 95 % - -   03/05/18 2048 (!) 149/96 - - - - - - -   03/05/18 1937 158/82 98.6  F (37  C) Temporal 81 16 96 % 1.778 m (5' 10\") 90.7 kg (200 lb)      Physical Exam  General: Resting on the gurney, appears somewhat uncomfortable  Head:  The scalp, face, and head appear normal  Mouth/Throat: Mucus membranes are moist  CV:  Regular rate    Normal S1 and S2  No pathological murmur   Resp:  Breath sounds clear and equal bilaterally    Non-labored, no retractions or accessory muscle use    No coarseness    No wheezing   GI:  Abdomen is soft, no rigidity    Lower abdomen tenderness to palpation, worst in left lower quadrant and " suprapubic region. Rebound present. No guarding.  MS:  Normal motor assessment of all extremities.    Good capillary refill noted.  Skin:  No rash or lesions noted.  Neuro: Speech is normal and fluent. No apparent deficit.  Psych:  Awake. Alert.  Normal affect.      Appropriate interactions.    Emergency Department Course     Imaging:  Radiographic findings were communicated with the patient who voiced understanding of the findings.    CT-scan abdomen pelvis w contrast:  1. Sigmoid diverticulitis with suspected tiny localized area of  perforation but no evidence for abscess.  2. Numerous hypodense liver lesions including cysts and some small  lesions too small to characterize which could be very small cysts.  As read by Radiology.      Laboratory:  CBC: WBC 15.0(H) ,o/w WNL (HGB 15.7, )   CMP: Glucose 104(H), o/w WNL (Creatinine 0.76)  1949 Lipase: 208     Emergency Department Course:  Past medical records, nursing notes, and vitals reviewed.  1943: I performed an exam of the patient and obtained history, as documented above.    IV started and labs were obtained as above.    The patient was sent for a CT scan while in the emergency department, findings above.    2109 General surgery paged.   2115: I discussed the case with Dr. Walters of general surgery regarding the patient.     2115 Hospital services paged.    Findings and plan explained to the Patient who consents to admission.   2120: Discussed the patient with Dr. Dunn of hospital services, who will admit the patient to a medical bed for further monitoring, evaluation, and treatment.     Impression & Plan    Medical Decision Making:  Claude Hayes is a 57 year old male presented with abdominal pain and CT confirms diverticulitis with suspected tiny localized area of perforation but no evidence of abscess; this appears consistent with the history and physical exam so I doubt another underlying etiology is also present. General surgery and  hospitalist are consulted. The patient will be admitted under the care of Dr. Dunn of hospitalist services for antibotics administration and surgical consult with Dr. Walters.     Diagnosis:    ICD-10-CM    1. Diverticulitis of large intestine with perforation, unspecified bleeding status K57.20      Disposition:  Admitted to medical bed    3/5/2018    EMERGENCY DEPARTMENT  I, Watson Pitt, am serving as a scribe at 7:43 PM on 3/5/2018 to document services personally performed by Bhavani Loya MD based on my observations and the provider's statements to me.       Bhavani Loya MD  03/06/18 0110

## 2018-03-06 NOTE — PHARMACY-ADMISSION MEDICATION HISTORY
Admission medication history interview status for the 3/5/2018  admission is complete. See EPIC admission navigator for prior to admission medications     Medication history source reliability:Good    Actions taken by pharmacist (provider contacted, etc): interviewed patient. Called [facility] pharmacy to clarify Prinivil and Crestor.     Additional medication history information not noted on PTA med list :None    Medication reconciliation/reorder completed by provider prior to medication history? No    Time spent in this activity: 15 minutes    Prior to Admission medications    Medication Sig Last Dose Taking? Auth Provider   Rosuvastatin Calcium (CRESTOR PO) Take 10 mg by mouth daily  3/5/2018 at am Yes Reported, Patient   Lisinopril (PRINIVIL PO) Take 10 mg by mouth daily  3/5/2018 at am Yes Unknown, Entered By History   ASPIRIN PO Take 81 mg by mouth daily 3/5/2018 at am Yes Unknown, Entered By History

## 2018-03-06 NOTE — PLAN OF CARE
Problem: Patient Care Overview  Goal: Plan of Care/Patient Progress Review  Outcome: No Change  Pt A &Ox4. Temp 99.5, other VSS on RA. Reported intermittent, mild abdominal pain, stated he was okay when offered intervetions. Bowel sounds hypoactive; reports passing flatus but no bowel movement yet this shift. Denies nausea. NPO except, meds, ice chips. NS at 150 ml/hr. General surgery consult pending. Up independently. D/c pending

## 2018-03-06 NOTE — PLAN OF CARE
Problem: Patient Care Overview  Goal: Plan of Care/Patient Progress Review  Outcome: No Change  Pt A/O x4. Admitted to unit at 2200. VS stable on RA except DPB in 90s. Up ind. Pt has abdomin discomfort. NPO ex ice chips. Voiding adequately. IV NS infusing 150ml/hr. Nursing continue to monitor.

## 2018-03-06 NOTE — ED NOTES
"[facility]  ED Nurse Handoff Report    ED Chief complaint: Abdominal Pain (pelvic area pain for past three days. states fever of 101 today and shakes last night)      ED Diagnosis:   Final diagnoses:   Diverticulitis of large intestine with perforation, unspecified bleeding status       Code Status: Full Code    Allergies: No Known Allergies    Activity level - Baseline/Home:  Independent    Activity Level - Current:   Independent     Needed?: No    Isolation: No  Infection: Not Applicable    Bariatric?: No    Vital Signs:   Vitals:    03/05/18 1937 03/05/18 2048 03/05/18 2100   BP: 158/82 (!) 149/96 (!) 143/91   Resp: 16     Temp: 98.6  F (37  C)     TempSrc: Temporal     SpO2: 96%  95%   Weight: 90.7 kg (200 lb)     Height: 1.778 m (5' 10\")         Cardiac Rhythm: ,        Pain level: 0-10 Pain Scale: 4    Is this patient confused?: No     Patient Report: Initial Complaint: Abdominal Pain (pelvic area pain for past three days. states fever of 101 today and shakes last night)    Focused Assessment: alert,oriented, ambulatory, abdomen tender with palpation greater on left than right, denies constipation/n/v/d, denies urinary s/sx, lungs clear.  Tests Performed: lab, ct abdomen  Abnormal Results: elevated WBC's, see CT report  Treatments provided: pt. Declined pain or nausea medications, IV abx    Family Comments: wife at bed side    OBS brochure/video discussed/provided to patient: N/A    ED Medications:   Medications   piperacillin-tazobactam (ZOSYN) 3.375 g vial to attach to  mL bag (not administered)   Saline Flush - CT (71 mLs Intravenous Given 3/5/18 2041)   iopamidol (ISOVUE-370) solution 101 mL (101 mLs Intravenous Given 3/5/18 2041)       Drips infusing?:  No    For the majority of the shift this patient was Green.   Interventions performed were .    Severe Sepsis OR Septic Shock Diagnosis Present: No      ED NURSE PHONE NUMBER: [id number]         "

## 2018-03-07 VITALS
HEIGHT: 70 IN | DIASTOLIC BLOOD PRESSURE: 81 MMHG | RESPIRATION RATE: 16 BRPM | BODY MASS INDEX: 28.63 KG/M2 | WEIGHT: 200 LBS | OXYGEN SATURATION: 95 % | SYSTOLIC BLOOD PRESSURE: 129 MMHG | TEMPERATURE: 97.6 F

## 2018-03-07 LAB
ANION GAP SERPL CALCULATED.3IONS-SCNC: 6 MMOL/L (ref 3–14)
BUN SERPL-MCNC: 9 MG/DL (ref 7–30)
CALCIUM SERPL-MCNC: 8.8 MG/DL (ref 8.5–10.1)
CHLORIDE SERPL-SCNC: 108 MMOL/L (ref 94–109)
CO2 SERPL-SCNC: 26 MMOL/L (ref 20–32)
CREAT SERPL-MCNC: 0.77 MG/DL (ref 0.66–1.25)
ERYTHROCYTE [DISTWIDTH] IN BLOOD BY AUTOMATED COUNT: 12.7 % (ref 10–15)
GFR SERPL CREATININE-BSD FRML MDRD: >90 ML/MIN/1.7M2
GLUCOSE SERPL-MCNC: 93 MG/DL (ref 70–99)
HCT VFR BLD AUTO: 45.8 % (ref 40–53)
HGB BLD-MCNC: 15.7 G/DL (ref 13.3–17.7)
MCH RBC QN AUTO: 29.3 PG (ref 26.5–33)
MCHC RBC AUTO-ENTMCNC: 34.3 G/DL (ref 31.5–36.5)
MCV RBC AUTO: 86 FL (ref 78–100)
PLATELET # BLD AUTO: 324 10E9/L (ref 150–450)
POTASSIUM SERPL-SCNC: 4 MMOL/L (ref 3.4–5.3)
RBC # BLD AUTO: 5.35 10E12/L (ref 4.4–5.9)
SODIUM SERPL-SCNC: 140 MMOL/L (ref 133–144)
WBC # BLD AUTO: 6.7 10E9/L (ref 4–11)

## 2018-03-07 PROCEDURE — 36415 COLL VENOUS BLD VENIPUNCTURE: CPT | Performed by: INTERNAL MEDICINE

## 2018-03-07 PROCEDURE — 25000132 ZZH RX MED GY IP 250 OP 250 PS 637: Performed by: INTERNAL MEDICINE

## 2018-03-07 PROCEDURE — 99231 SBSQ HOSP IP/OBS SF/LOW 25: CPT | Performed by: SURGERY

## 2018-03-07 PROCEDURE — 80048 BASIC METABOLIC PNL TOTAL CA: CPT | Performed by: INTERNAL MEDICINE

## 2018-03-07 PROCEDURE — 25000128 H RX IP 250 OP 636: Performed by: INTERNAL MEDICINE

## 2018-03-07 PROCEDURE — 85027 COMPLETE CBC AUTOMATED: CPT | Performed by: INTERNAL MEDICINE

## 2018-03-07 PROCEDURE — 99238 HOSP IP/OBS DSCHRG MGMT 30/<: CPT | Performed by: INTERNAL MEDICINE

## 2018-03-07 RX ORDER — CIPROFLOXACIN 500 MG/1
500 TABLET, FILM COATED ORAL 2 TIMES DAILY
Qty: 20 TABLET | Refills: 0 | Status: SHIPPED | OUTPATIENT
Start: 2018-03-07 | End: 2018-03-07

## 2018-03-07 RX ORDER — CIPROFLOXACIN 500 MG/1
500 TABLET, FILM COATED ORAL 2 TIMES DAILY
Qty: 20 TABLET | Refills: 0 | Status: SHIPPED | OUTPATIENT
Start: 2018-03-07 | End: 2018-04-11

## 2018-03-07 RX ORDER — METRONIDAZOLE 500 MG/1
500 TABLET ORAL 3 TIMES DAILY
Qty: 30 TABLET | Refills: 0 | Status: SHIPPED | OUTPATIENT
Start: 2018-03-07 | End: 2018-04-11

## 2018-03-07 RX ORDER — METRONIDAZOLE 500 MG/1
500 TABLET ORAL 3 TIMES DAILY
Qty: 30 TABLET | Refills: 0 | Status: SHIPPED | OUTPATIENT
Start: 2018-03-07 | End: 2018-03-07

## 2018-03-07 RX ADMIN — ROSUVASTATIN CALCIUM 10 MG: 10 TABLET ORAL at 07:59

## 2018-03-07 RX ADMIN — PIPERACILLIN SODIUM,TAZOBACTAM SODIUM 3.38 G: 3; .375 INJECTION, POWDER, FOR SOLUTION INTRAVENOUS at 03:45

## 2018-03-07 RX ADMIN — PIPERACILLIN SODIUM,TAZOBACTAM SODIUM 3.38 G: 3; .375 INJECTION, POWDER, FOR SOLUTION INTRAVENOUS at 10:36

## 2018-03-07 RX ADMIN — SODIUM CHLORIDE: 9 INJECTION, SOLUTION INTRAVENOUS at 03:45

## 2018-03-07 RX ADMIN — LISINOPRIL 10 MG: 10 TABLET ORAL at 07:58

## 2018-03-07 NOTE — DISCHARGE SUMMARY
[facility]    Discharge Summary  Hospitalist    Date of Admission:  3/5/2018  Date of Discharge:  3/7/2018  Discharging Provider: Simone Enriquez DO    Discharge Diagnoses   1. Diverticulitis w/perforation and sepsis  2. Liver lesions  3. HTN   4. HLP     History of Present Illness   Claude Hayes is an 57 year old male with a history of hypertension and HLP but otherwise healthy who presented with abdominal pain that started 2 to 3 days prior, mild subjective fever with chills, and a temp on the day of admission of 101.0, slight decreased appetite.  He denied any vomiting and is still passing gas but no bowel movement.      Hospital Course   Claude Hayes was admitted on 3/5/2018.  The following problems were addressed during his hospitalization:    Upon arrival in the ED an abdominal CT shows small localized perforated sigmoid diverticulitis.  Initial lab work showed a slight leukocytosis of 15.0 and he had a documented fever of 101 at home.  Patient was started on IV antibiotics with Zosyn and surgery was consulted.  They recommended medical management and followed along.  Patient was able to tolerate a liquid diet and on the day of discharge he was pain free without the use of medications.  He was switched to oral antibiotics and discharged to home with Cipro and Flagyl for 10 days.       # Discharge Pain Plan:   - Patient currently has NO PAIN and is not being prescribed pain medications on discharge.      Simone Enriquez DO    Significant Results and Procedures   See below    Pending Results   No pending results  Unresulted Labs Ordered in the Past 30 Days of this Admission     No orders found from 1/4/2018 to 3/6/2018.          Code Status   Full Code       Primary Care Physician   Alexander Grady    Physical Exam   Temp: 97.6  F (36.4  C) Temp src: Oral BP: 129/81   Heart Rate: 76 Resp: 16 SpO2: 95 % O2 Device: None (Room air)    Vitals:    03/05/18 1937   Weight: 90.7 kg  (200 lb)     Vital Signs with Ranges  Temp:  [97.6  F (36.4  C)-98  F (36.7  C)] 97.6  F (36.4  C)  Heart Rate:  [72-76] 76  Resp:  [16] 16  BP: (117-140)/(79-97) 129/81  SpO2:  [94 %-97 %] 95 %  I/O last 3 completed shifts:  In: 3047 [P.O.:650; I.V.:2397]  Out: -     Constitutional: Resting comfortably in bed.  NAD  Eyes: EOMI  ENT: Moist mucous membranes  Respiratory: Clear to auscultation.  No respiratory distress  Cardiovascular: RRR.  No murmurs  GI: Bowel sounds present.  No tenderness.  No rebound or guarding    Discharge Disposition   Discharged to home  Condition at discharge: Stable    Consultations This Hospital Stay   SURGERY GENERAL IP CONSULT    Time Spent on this Encounter   ISimone, personally saw the patient today and spent less than or equal to 30 minutes discharging this patient.    Discharge Orders     Reason for your hospital stay   Diverticulitis     Follow-up and recommended labs and tests    Follow up with primary care provider, Alexander Grady, within 2-4 weeks, for hospital follow- up. No follow up labs or test are needed.    Should follow-up with surgery as needed     Activity   Your activity upon discharge: activity as tolerated     Full Code     Diet   Follow this diet upon discharge: Orders Placed This Encounter     Full Liquid Diet and advance as tolerated.  Recommend low fiber diet       Discharge Medications   Current Discharge Medication List      START taking these medications    Details   ciprofloxacin (CIPRO) 500 MG tablet Take 1 tablet (500 mg) by mouth 2 times daily  Qty: 20 tablet, Refills: 0    Associated Diagnoses: Diverticulitis of large intestine with perforation, unspecified bleeding status      metroNIDAZOLE (FLAGYL) 500 MG tablet Take 1 tablet (500 mg) by mouth 3 times daily  Qty: 30 tablet, Refills: 0    Associated Diagnoses: Diverticulitis of large intestine with perforation, unspecified bleeding status         CONTINUE these medications which have NOT  CHANGED    Details   Rosuvastatin Calcium (CRESTOR PO) Take 10 mg by mouth daily       Lisinopril (PRINIVIL PO) Take 10 mg by mouth daily       ASPIRIN PO Take 81 mg by mouth daily           Allergies   No Known Allergies  Data   Most Recent 3 CBC's:  Recent Labs   Lab Test  03/07/18   0823 03/06/18   0945  03/05/18 1949   WBC  6.7  13.3*  15.0*   HGB  15.7  15.2  15.7   MCV  86  85  84   PLT  324  324  323      Most Recent 3 BMP's:  Recent Labs   Lab Test  03/07/18   0823 03/06/18   0945  03/05/18 1949   NA  140  139  137   POTASSIUM  4.0  3.9  3.6   CHLORIDE  108  106  104   CO2  26  26  28   BUN  9  13  16   CR  0.77  0.83  0.76   ANIONGAP  6  7  5   ANNA  8.8  8.5  8.7   GLC  93  91  104*     Most Recent 2 LFT's:  Recent Labs   Lab Test  03/05/18 1949   AST  19   ALT  26   ALKPHOS  67   BILITOTAL  0.6     Most Recent INR's and Anticoagulation Dosing History:  Anticoagulation Dose History     There is no flowsheet data to display.        Most Recent 3 Troponin's:No lab results found.  Most Recent Cholesterol Panel:No lab results found.  Most Recent 6 Bacteria Isolates From Any Culture (See EPIC Reports for Culture Details):No lab results found.  Most Recent TSH, T4 and A1c Labs:No lab results found.  Results for orders placed or performed during the hospital encounter of 03/05/18   CT Abdomen Pelvis w Contrast    Narrative    CT ABDOMEN PELVIS W CONTRAST 3/5/2018 8:44 PM    TECHNIQUE: Images from diaphragm to pubic symphysis 101 mL Isovue -370  IV contrast.  Radiation dose for this scan was reduced using automated exposure  control, adjustment of the mA and/or kV according to patient size, or  iterative reconstruction technique.    HISTORY: lower abdomen pain with rebound.  Left and pelvic worse than  right;     COMPARISON: None.    FINDINGS:   Abdomen and Pelvis: Colon bowel wall thickening and pericolonic  stranding involving the sigmoid colon consistent with acute  diverticulitis. No evidence for  abscess. There is a 0.3 cm gas bubble  along the posterior margin of the sigmoid colon which could be a small  localized area of perforation. Normal appendix.    Discoid atelectasis is at the lung bases, lingula and medial right  middle lobe. Several small low dense liver lesions, some are too small  to characterize. The largest lesions consistent with cysts.  Normal-appearing gallbladder, spleen, pancreas, adrenal glands and  kidneys. Tiny fat-containing umbilical hernia.      Impression    IMPRESSION :  1. Sigmoid diverticulitis with suspected tiny localized area of  perforation but no evidence for abscess.  2. Numerous hypodense liver lesions including cysts and some small  lesions too small to characterize which could be very small cysts.    MARIA A CANO MD

## 2018-03-07 NOTE — PLAN OF CARE
Problem: Patient Care Overview  Goal: Plan of Care/Patient Progress Review  Outcome: Improving  Pt A/Ox4. Independent. VSS on RA. No complaints of pain. Tolerating clear liquids. IVF infusing. Bowel sounds active. No complaints of nausea. D/C 1-2 days pending progress.

## 2018-03-07 NOTE — PROGRESS NOTES
Afebrile. Pulse OK. Pain much better. Tolerated clears. Walked, voided.  Exam: non tender away from LLQ. Soft upper abd.   Labs: WBC down to normal.  Imp: resolving diverticulitis  Suggest: advance diet, home today on PO antibiotics, low fiber diet for 3 weeks.

## 2018-03-07 NOTE — PLAN OF CARE
Problem: Patient Care Overview  Goal: Plan of Care/Patient Progress Review  Outcome: Adequate for Discharge Date Met: 03/07/18  Patient alert/orient X4, up independently in room/hallway.  Lungs clear, on RA.  Bowel sounds +, passing flatus.  Tolerating full liquids, discharging to home today on PO antibiotics.  Vss, denied any pain.

## 2018-04-11 ENCOUNTER — OFFICE VISIT (OUTPATIENT)
Dept: FAMILY MEDICINE | Facility: CLINIC | Age: 58
End: 2018-04-11
Payer: COMMERCIAL

## 2018-04-11 VITALS
DIASTOLIC BLOOD PRESSURE: 76 MMHG | HEART RATE: 79 BPM | WEIGHT: 206 LBS | OXYGEN SATURATION: 96 % | SYSTOLIC BLOOD PRESSURE: 122 MMHG | TEMPERATURE: 98 F | HEIGHT: 70 IN | BODY MASS INDEX: 29.49 KG/M2

## 2018-04-11 DIAGNOSIS — I10 ESSENTIAL HYPERTENSION: ICD-10-CM

## 2018-04-11 DIAGNOSIS — K76.9 LIVER LESION: ICD-10-CM

## 2018-04-11 DIAGNOSIS — G89.29 CHRONIC BILATERAL LOW BACK PAIN WITHOUT SCIATICA: ICD-10-CM

## 2018-04-11 DIAGNOSIS — K57.20 PERFORATION OF SIGMOID COLON DUE TO DIVERTICULITIS: Primary | ICD-10-CM

## 2018-04-11 DIAGNOSIS — M54.50 CHRONIC BILATERAL LOW BACK PAIN WITHOUT SCIATICA: ICD-10-CM

## 2018-04-11 DIAGNOSIS — E78.5 HYPERLIPIDEMIA LDL GOAL <100: ICD-10-CM

## 2018-04-11 PROBLEM — Z82.49 FAMILY HISTORY OF CORONARY ARTERY DISEASE: Status: ACTIVE | Noted: 2017-08-08

## 2018-04-11 PROBLEM — I25.10 CALCIFICATION OF CORONARY ARTERY: Status: ACTIVE | Noted: 2017-10-13

## 2018-04-11 PROCEDURE — 99214 OFFICE O/P EST MOD 30 MIN: CPT | Performed by: INTERNAL MEDICINE

## 2018-04-11 RX ORDER — LISINOPRIL 10 MG/1
10 TABLET ORAL DAILY
Qty: 90 TABLET | Refills: 3 | Status: SHIPPED | OUTPATIENT
Start: 2018-04-11 | End: 2020-01-10

## 2018-04-11 RX ORDER — ROSUVASTATIN CALCIUM 20 MG/1
20 TABLET, COATED ORAL DAILY
Qty: 90 TABLET | Refills: 3 | Status: SHIPPED | OUTPATIENT
Start: 2018-04-11 | End: 2020-01-10

## 2018-04-11 NOTE — PROGRESS NOTES
SUBJECTIVE:   Claude Hayes is a 57 year old male who presents to clinic today for the following health issues:      New Patient/Transfer of Care      Hospital Follow-up Visit:    Hospital/Nursing Home/IP Rehab Facility: [facility]  Date of Admission: 03/05/2018  Date of Discharge: 03/07/2018  Reason(s) for Admission:     1. Diverticulitis w/perforation and sepsis  2. Liver lesions  3. HTN   4. HLP             Problems taking medications regularly:  None       Medication changes since discharge: None       Problems adhering to non-medication therapy:  None    Summary of hospitalization:  [facility] discharge summary reviewed  Diagnostic Tests/Treatments reviewed.  Follow up needed: general surgery clinic, hepatology clinic  Other Healthcare Providers Involved in Patient s Care:         Specialist appointment - general surgery, hepatology clinics  Update since discharge: improved.     Post Discharge Medication Reconciliation: discharge medications reconciled and changed, per note/orders (see AVS).  Plan of care communicated with patient     Coding guidelines for this visit:  Type of Medical   Decision Making Face-to-Face Visit       within 7 Days of discharge Face-to-Face Visit        within 14 days of discharge   Moderate Complexity [id number]   High Complexity [id number]            HPI:   Patient Claude Hayes is a very pleasant 57 year old male with history of recent hospitalization for perforated diverticulitis who presents to Internal Medicine clinic today for post hospital discharge follow up of multiple concerns including his recent diverticulitis with bowel perforation. Regarding the patient's diverticulitis with sigmoid colon performation, the patient denies any acute abdominal pains at this time. He is interested in an evaluation by outpatient general surgery clinic going forward for his diverticulitis with bowel perforation going forward. Regarding the patient's recent  incidental findings of liver cysts lesions on the CT scan, the patient is interested in an evaluation by outpatient hepatology clinic at the [facility] going forward. He denies any chest pain, headaches, fever or chills. Patient's BP is currently well controlled. He is due for a refill of his lisinopril medication. He is also due for a refill of his Crestor cholesterol medication for hyperlipidemia treatment at this time. He is also complaining of chronic mechanical low back pains for many years duration and is interested in an evaluation by outpatient physical therapy going froward.          Current Medications:     Current Outpatient Prescriptions   Medication Sig Dispense Refill     Rosuvastatin Calcium (CRESTOR PO) Take 10 mg by mouth daily        Lisinopril (PRINIVIL PO) Take 10 mg by mouth daily        ASPIRIN PO Take 81 mg by mouth daily           Allergies:      Allergies   Allergen Reactions     Seasonal Allergies             Past Medical History:     Past Medical History:   Diagnosis Date     Elevated cholesterol      Hypertension          Past Surgical History:     Past Surgical History:   Procedure Laterality Date     TONSILLECTOMY  1966         Family Medical History:     Family History   Problem Relation Age of Onset     Other - See Comments Mother      Old age,  age 98     HEART DISEASE Father      CABG age 54         Social History:     Social History     Social History     Marital status:      Spouse name: N/A     Number of children: 2     Years of education: N/A     Occupational History     Religion       Social History Main Topics     Smoking status: Never Smoker     Smokeless tobacco: Never Used     Alcohol use Yes      Comment: 1 drink a week     Drug use: No     Sexual activity: Yes     Partners: Female     Other Topics Concern     Not on file     Social History Narrative    , 2 children, works as a Religion . Was working out regularly up to 3 months  "ago.  (last updated 3/5/2018)            Review of System:     Constitutional: Negative for fever or chills  Skin: Negative for rashes  Ears/Nose/Throat: Negative for nasal congestion, sore throat  Respiratory: No shortness of breath, dyspnea on exertion, cough, or hemoptysis  Cardiovascular: Negative for chest pain  Gastrointestinal: Negative for nausea, vomiting or abdominal pains. Positive for recent diagnosis of perforated diverticulitis and liver cysts  Genitourinary: Negative for dysuria, hematuria  Musculoskeletal: Positive for chronic low back pains  Neurologic: Negative for headaches  Psychiatric: Negative for depression, anxiety  Hematologic/Lymphatic/Immunologic: Negative  Endocrine: Negative  Behavioral: Negative for tobacco use       Physical Exam:   /76 (BP Location: Left arm, Cuff Size: Adult Regular)  Pulse 79  Temp 98  F (36.7  C) (Oral)  Ht 5' 10\" (1.778 m)  Wt 206 lb (93.4 kg)  SpO2 96%  BMI 29.56 kg/m2    GENERAL: alert and no distress  EYES: eyes grossly normal to inspection, and conjunctivae and sclerae normal  HENT: Normocephalic atraumatic. Nose and mouth without ulcers or lesions  NECK: supple  RESP: lungs clear to auscultation   CV: regular rate and rhythm, normal S1 S2  LYMPH: no peripheral edema   ABDOMEN: non-distended, non-tender to palpitation, no rebound or guarding  MS: diffuse low back pains noted  SKIN: no suspicious lesions or rashes  NEURO: Alert & Oriented x 3.   PSYCH: mentation appears normal, affect normal        Diagnostic Test Results:     Diagnostic Test Results:  Results for orders placed or performed during the hospital encounter of 03/05/18   CT Abdomen Pelvis w Contrast    Narrative    CT ABDOMEN PELVIS W CONTRAST 3/5/2018 8:44 PM    TECHNIQUE: Images from diaphragm to pubic symphysis 101 mL Isovue -370  IV contrast.  Radiation dose for this scan was reduced using automated exposure  control, adjustment of the mA and/or kV according to patient size, " or  iterative reconstruction technique.    HISTORY: lower abdomen pain with rebound.  Left and pelvic worse than  right;     COMPARISON: None.    FINDINGS:   Abdomen and Pelvis: Colon bowel wall thickening and pericolonic  stranding involving the sigmoid colon consistent with acute  diverticulitis. No evidence for abscess. There is a 0.3 cm gas bubble  along the posterior margin of the sigmoid colon which could be a small  localized area of perforation. Normal appendix.    Discoid atelectasis is at the lung bases, lingula and medial right  middle lobe. Several small low dense liver lesions, some are too small  to characterize. The largest lesions consistent with cysts.  Normal-appearing gallbladder, spleen, pancreas, adrenal glands and  kidneys. Tiny fat-containing umbilical hernia.      Impression    IMPRESSION :  1. Sigmoid diverticulitis with suspected tiny localized area of  perforation but no evidence for abscess.  2. Numerous hypodense liver lesions including cysts and some small  lesions too small to characterize which could be very small cysts.    MARIA A CANO MD   CBC with platelets differential   Result Value Ref Range    WBC 15.0 (H) 4.0 - 11.0 10e9/L    RBC Count 5.47 4.4 - 5.9 10e12/L    Hemoglobin 15.7 13.3 - 17.7 g/dL    Hematocrit 46.1 40.0 - 53.0 %    MCV 84 78 - 100 fl    MCH 28.7 26.5 - 33.0 pg    MCHC 34.1 31.5 - 36.5 g/dL    RDW 12.8 10.0 - 15.0 %    Platelet Count 323 150 - 450 10e9/L    Diff Method Automated Method     % Neutrophils 71.2 %    % Lymphocytes 16.3 %    % Monocytes 11.2 %    % Eosinophils 0.8 %    % Basophils 0.2 %    % Immature Granulocytes 0.3 %    Absolute Neutrophil 10.7 (H) 1.6 - 8.3 10e9/L    Absolute Lymphocytes 2.5 0.8 - 5.3 10e9/L    Absolute Monocytes 1.7 (H) 0.0 - 1.3 10e9/L    Absolute Eosinophils 0.1 0.0 - 0.7 10e9/L    Absolute Basophils 0.0 0.0 - 0.2 10e9/L    Abs Immature Granulocytes 0.1 0 - 0.4 10e9/L   Comprehensive metabolic panel   Result Value Ref Range     Sodium 137 133 - 144 mmol/L    Potassium 3.6 3.4 - 5.3 mmol/L    Chloride 104 94 - 109 mmol/L    Carbon Dioxide 28 20 - 32 mmol/L    Anion Gap 5 3 - 14 mmol/L    Glucose 104 (H) 70 - 99 mg/dL    Urea Nitrogen 16 7 - 30 mg/dL    Creatinine 0.76 0.66 - 1.25 mg/dL    GFR Estimate >90 >60 mL/min/1.7m2    GFR Estimate If Black >90 >60 mL/min/1.7m2    Calcium 8.7 8.5 - 10.1 mg/dL    Bilirubin Total 0.6 0.2 - 1.3 mg/dL    Albumin 3.7 3.4 - 5.0 g/dL    Protein Total 7.6 6.8 - 8.8 g/dL    Alkaline Phosphatase 67 40 - 150 U/L    ALT 26 0 - 70 U/L    AST 19 0 - 45 U/L   Lipase   Result Value Ref Range    Lipase 208 73 - 393 U/L   CBC with platelets   Result Value Ref Range    WBC 13.3 (H) 4.0 - 11.0 10e9/L    RBC Count 5.24 4.4 - 5.9 10e12/L    Hemoglobin 15.2 13.3 - 17.7 g/dL    Hematocrit 44.7 40.0 - 53.0 %    MCV 85 78 - 100 fl    MCH 29.0 26.5 - 33.0 pg    MCHC 34.0 31.5 - 36.5 g/dL    RDW 12.9 10.0 - 15.0 %    Platelet Count 324 150 - 450 10e9/L   Basic metabolic panel   Result Value Ref Range    Sodium 139 133 - 144 mmol/L    Potassium 3.9 3.4 - 5.3 mmol/L    Chloride 106 94 - 109 mmol/L    Carbon Dioxide 26 20 - 32 mmol/L    Anion Gap 7 3 - 14 mmol/L    Glucose 91 70 - 99 mg/dL    Urea Nitrogen 13 7 - 30 mg/dL    Creatinine 0.83 0.66 - 1.25 mg/dL    GFR Estimate >90 >60 mL/min/1.7m2    GFR Estimate If Black >90 >60 mL/min/1.7m2    Calcium 8.5 8.5 - 10.1 mg/dL   Basic metabolic panel   Result Value Ref Range    Sodium 140 133 - 144 mmol/L    Potassium 4.0 3.4 - 5.3 mmol/L    Chloride 108 94 - 109 mmol/L    Carbon Dioxide 26 20 - 32 mmol/L    Anion Gap 6 3 - 14 mmol/L    Glucose 93 70 - 99 mg/dL    Urea Nitrogen 9 7 - 30 mg/dL    Creatinine 0.77 0.66 - 1.25 mg/dL    GFR Estimate >90 >60 mL/min/1.7m2    GFR Estimate If Black >90 >60 mL/min/1.7m2    Calcium 8.8 8.5 - 10.1 mg/dL   CBC with platelets   Result Value Ref Range    WBC 6.7 4.0 - 11.0 10e9/L    RBC Count 5.35 4.4 - 5.9 10e12/L    Hemoglobin 15.7 13.3 - 17.7 g/dL     Hematocrit 45.8 40.0 - 53.0 %    MCV 86 78 - 100 fl    MCH 29.3 26.5 - 33.0 pg    MCHC 34.3 31.5 - 36.5 g/dL    RDW 12.7 10.0 - 15.0 %    Platelet Count 324 150 - 450 10e9/L   Surgery General IP Consult: Patient to be seen: Routine - within 24 hours; Call back #: Call Dr. Dunn at [phone] if questions; ruptured diverticulitis (Stable) -- she is aware of consult; Consultant may enter orders: Yes    Narrative    Wiley Butt MD     3/6/2018  2:02 PM  General Surgery Hostpital Consultation/H&P    Claude Hayes MRN#: [id number]   Age: 57 year old YOB: 1960     Date of Admission:          3/5/2018  Reason for consult/H&P: Diverticular disease   Surgeon:      Wiley Butt MD                  Chief Complaint:   Abdominal pain         History of Present Illness:   This patient is a 57 year old  male who presented to the [facility] ER with fevers chills and abdominal pain. The   pain became worse yesterday and he went to the emergency room.    He has had this pain for several days.   He has had no trauma to   the abdomen.  He did have some fevers and chills.. Denies ,   nausea, vomiting, change in BM or urination. Today he feels much   better.  He has not had similar spells of pain and fevers.   He   is generally quite healthy.  Denies having any previous episodes or abdominal surgery. History   is obtained from the patient and chart.         Past Medical History:    has a past medical history of Elevated cholesterol and   Hypertension.          Past Surgical History:     Past Surgical History:   Procedure Laterality Date     TONSILLECTOMY  1966            Medications:     Prior to Admission medications    Medication Sig Start Date End Date Taking? Authorizing Provider   Rosuvastatin Calcium (CRESTOR PO) Take 10 mg by mouth daily      Yes Reported, Patient   Lisinopril (PRINIVIL PO) Take 10 mg by mouth daily    Yes   Unknown, Entered By History   ASPIRIN PO Take 81 mg by  "mouth daily   Yes Unknown, Entered By   History            Allergies:   No Known Allergies         Social History:     Social History   Substance Use Topics     Smoking status: Never Smoker     Smokeless tobacco: Never Used     Alcohol use Yes      Comment: 1 drink a week             Family History:    This patient has no significant relevant family history.  Family   history is reviewed in detail.          Review of Systems:   Brief ROS is negative other than noted in the HPI.  C: NEGATIVE for fever, chills, change in weight  R: NEGATIVE for significant cough or SOB  CV: NEGATIVE for chest pain, palpitations or peripheral edema  GI:  NEGATIVE for dysuria, heartburn, or change in bowel habits  H: NEGATIVE for bleeding problems         Physical Exam:   Blood pressure 125/80, temperature 98.2  F (36.8  C), temperature   source Oral, resp. rate 16, height 1.778 m (5' 10\"), weight 90.7   kg (200 lb), SpO2 97 %.  I/O last 3 completed shifts:  In: 1927 [I.V.:1927]  Out: -     General - This is a well developed, well nourished male .  HEENT - Normocephalic. Atraumatic. Moist mucous membranes. Pupils   equal.  No scleral icterus. Nose normal.  Neck - Supple without masses. No cervical adenopathy or   thyromegaly  Lungs - Breathing not labored  Chest - Not tender. CVA's nontender  Heart - Regular rate & rhythm   Abdomen - Soft, nontender except in the lower abdomen.  He is   moderately tender on both sides. nondistended with +bowel sounds,   no organomegaly.  Extremities - Moves all extremities. Warm without edema.  Neurologic - Nonfocal.          Data:   Labs:  Recent Labs   Lab Test  03/06/18   0945  03/05/18 1949   WBC  13.3*  15.0*   HGB  15.2  15.7   HCT  44.7  46.1   PLT  324  323     Recent Labs   Lab Test  03/06/18   0945  03/05/18 1949   POTASSIUM  3.9  3.6   CHLORIDE  106  104   CO2  26  28   BUN  13  16   CR  0.83  0.76     Recent Labs   Lab Test  03/05/18 1949   BILITOTAL  0.6   ALT  26   AST  19   ALKPHOS  " 67   LIPASE  208     No lab results found.  Recent Labs   Lab Test  03/06/18   0945  03/05/18 1949   ANNA  8.5  8.7     Recent Labs   Lab Test  03/06/18   0945  03/05/18 1949   ANIONGAP  7  5   ALBUMIN   --   3.7       CT scan of the abdomen: images reviewed in detail.  diverticular   disease without  Free perforation.    Ultrasound of the abdomen: not done                 Assessment:   First attack of severe acute diverticulitis.  No free   perforation.         Plan:    he is improving rapidly.  He should be able to go home on oral   antibiotics soon.  We can start clear liquids today.       I have discussed the history, physical, and plan with the   patient.   Wiley Butt M.D.            ASSESSMENT/PLAN:       (K57.20) Perforation of sigmoid diverticulitis (primary encounter diagnosis)  Comment: patient has no abdominal pains at this time. He is interested in a follow up appointment with outpatient general surgery clinic. No fever at this time.  Plan: With no fever or abdominal pains at this time, there is no indication for further antibiotic therapy at this time.  I have ordered general surgery referral with [facility] general surgery clinic in [place] going forward.      (E78.5) Hyperlipidemia LDL goal <100    Comment: chronic hyperlipidemia stable on Crestor cholesterol medication, patient is due for a refill of his Crestor medication.  Plan: I have refilled the patient's rosuvastatin (CRESTOR) 20 MG tablet medication today.            (I10) Essential hypertension  Comment: patient's BP is currently at an acceptable range for his age. He is due for a refill of his Lisinopril medication today.  Plan: I have refilled his lisinopril (PRINIVIL) 10 MG tablet      (K76.9) Liver lesion  Comment: recent CT scan shows multiple liver cysts of unknown clinical significance.  Plan: I have ordered GASTROENTEROLOGY ADULT REF CONSULT ONLY with the [facility] hepatology clinic in [place] for further evaluation going forward.             (M54.5,  G89.29) Chronic bilateral low back pain without sciatica  Comment: chronic bilateral low back pains not well controlled.   Plan: I have ordered [facility], HAND, AND CHIROPRACTIC REFERRAL for outpatient PT treatment going forward.      Follow Up Plan:     Patient is instructed to return to Internal Medicine clinic for follow-up visit in 1 month.        Fabiana Hernandez MD  Internal Medicine  [facility] [employer] [facility] [employer] [facility]

## 2018-04-11 NOTE — NURSING NOTE
"Chief Complaint   Patient presents with     [facility] F/U     Follow Up For     Lower back pain       Initial /76 (BP Location: Left arm, Cuff Size: Adult Regular)  Pulse 79  Temp 98  F (36.7  C) (Oral)  Ht 5' 10\" (1.778 m)  Wt 206 lb (93.4 kg)  SpO2 96%  BMI 29.56 kg/m2 Estimated body mass index is 29.56 kg/(m^2) as calculated from the following:    Height as of this encounter: 5' 10\" (1.778 m).    Weight as of this encounter: 206 lb (93.4 kg).  Medication Reconciliation: complete     Libra Espinal CMA      "

## 2018-04-11 NOTE — MR AVS SNAPSHOT
After Visit Summary   4/11/2018    Claude Hayes    MRN: [id number]           Patient Information     Date Of Birth          1960        Visit Information        Provider Department      4/11/2018 9:20 AM Fabiana Hernandez MD [facility]        Today's Diagnoses     Perforation of sigmoid diverticulitis    -  1    Essential hypertension        Hyperlipidemia LDL goal <100        Liver lesion        Chronic bilateral low back pain without sciatica           Follow-ups after your visit        Additional Services     GASTROENTEROLOGY ADULT REF CONSULT ONLY       Preferred Location: [facility], [facility] [phone]      Please be aware that coverage of these services is subject to the terms and limitations of your health insurance plan.  Call member services at your health plan with any benefit or coverage questions.  Any procedures must be performed at a [facility] facility OR coordinated by your clinic's referral office.    Please bring the following with you to your appointment:    (1) Any X-Rays, CTs or MRIs which have been performed.  Contact the facility where they were done to arrange for  prior to your scheduled appointment.    (2) List of current medications   (3) This referral request   (4) Any documents/labs given to you for this referral            [facility] PT, HAND, AND CHIROPRACTIC REFERRAL       **This order will print in the [facility] Scheduling Office**    Physical Therapy, Hand Therapy and Chiropractic Care are available through:    *[facility]  *[facility]  *[facility]    Call one number to schedule at any of the above locations: [phone].    Your provider has referred you to: Physical Therapy at [facility] or [facility]    Indication/Reason for Referral: Low Back Pain  Onset of Illness: chronic  Therapy Orders: Evaluate and Treat  Special Programs: None  Special Request: None    Lilli Reeder      Additional Comments for  the Therapist or Chiropractor: none    Please be aware that coverage of these services is subject to the terms and limitations of your health insurance plan.  Call member services at your health plan with any benefit or coverage questions.      Please bring the following to your appointment:    *Your personal calendar for scheduling future appointments  *Comfortable clothing                  Your next 10 appointments already scheduled     Apr 13, 2018  9:00 AM CDT   (Arrive by 8:45 AM)   [facility] Spine with Shamar Elkins PT   [facility] ([facility]  )    [address] [place] [address] [address]  [facility] [address]   [phone]            Apr 24, 2018  9:30 AM CDT   (Arrive by 9:15 AM)   New Patient Visit with Naman Jay MD   [facility] Rectal [facility] ([facility])    [address] [place] [address]  4th Floor  [facility] [id number] [phone]            May 29, 2018  7:30 AM CDT   Lab with  LAB   [facility] Lab ([facility])    [address] [place] [address]  1st Floor  [facility] [id number] [phone]            May 29, 2018  8:30 AM CDT   (Arrive by 8:15 AM)   New General Liver with Alivia Telles MD   [facility] Hepatology ([facility])    [address] [place] [address]  [address]  [facility] [id number] [phone]              Who to contact     If you have questions or need follow up information about today's clinic visit or your schedule please contact [facility] directly at [phone].  Normal or non-critical lab and imaging results will be communicated to you by MyChart, letter or phone within 4 business days after the clinic has received the results. If you do not hear from us within 7 days, please contact the clinic through MyChart or phone. If you have a critical or abnormal lab result, we will notify you by phone as soon as possible.  Submit refill requests through  "[employer] or call your pharmacy and they will forward the refill request to us. Please allow 3 business days for your refill to be completed.          Additional Information About Your Visit        castacliphart Information     [employer] gives you secure access to your electronic health record. If you see a primary care provider, you can also send messages to your care team and make appointments. If you have questions, please call your primary care clinic.  If you do not have a primary care provider, please call [phone] and they will assist you.        Care EveryWhere ID     This is your Care EveryWhere ID. This could be used by other organizations to access your [facility] medical records  [id number]        Your Vitals Were     Pulse Temperature Height Pulse Oximetry BMI (Body Mass Index)       79 98  F (36.7  C) (Oral) 5' 10\" (1.778 m) 96% 29.56 kg/m2        Blood Pressure from Last 3 Encounters:   04/11/18 122/76   03/07/18 129/81    Weight from Last 3 Encounters:   04/11/18 206 lb (93.4 kg)   03/05/18 200 lb (90.7 kg)              We Performed the Following     GASTROENTEROLOGY ADULT REF CONSULT ONLY     [facility] PT, HAND, AND CHIROPRACTIC REFERRAL     27 bards ACCESS CODE - Add PCP and Click on cosign on right          Today's Medication Changes          These changes are accurate as of 4/11/18 11:59 PM.  If you have any questions, ask your nurse or doctor.               These medicines have changed or have updated prescriptions.        Dose/Directions    lisinopril 10 MG tablet   Commonly known as:  PRINIVIL   This may have changed:  medication strength   Used for:  Essential hypertension   Changed by:  Fabiana Hernandez MD        Dose:  10 mg   Take 1 tablet (10 mg) by mouth daily   Quantity:  90 tablet   Refills:  3       rosuvastatin 20 MG tablet   Commonly known as:  CRESTOR   This may have changed:    - medication strength  - how much to take   Used for:  Hyperlipidemia LDL goal <100   Changed by:  Fabiana Hernandez " MD Jesus        Dose:  20 mg   Take 1 tablet (20 mg) by mouth daily   Quantity:  90 tablet   Refills:  3            Where to get your medicines      [facility] [address] [place] [address] [place], [place] - [address] AT [address] [place] [address] & [facility]  [address] [place] [address] [address]    Hours:  24-hours Phone:  [phone]     lisinopril 10 MG tablet    rosuvastatin 20 MG tablet                Primary Care Provider Office Phone # Fax #    Fabiana Jesus Hernandez [phone] [address] [facility] [address]  [place] [address]        Equal Access to Services     WAYNE [facility] CountySUKHDEEP : Hadii estefany pedro hadasho Soomaali, waaxda luqadaha, qaybta kaalmada adeegyada, fuentes jesus . So [facility] [phone].    ATENCIÓN: Si habla español, tiene a smith disposición servicios gratuitos de asistencia lingüística. Llame al [phone].    We comply with applicable federal civil rights laws and [place] laws. We do not discriminate on the basis of race, color, national origin, age, disability, sex, sexual orientation, or gender identity.            Thank you!     Thank you for choosing [facility]  for your care. Our goal is always to provide you with excellent care. Hearing back from our patients is one way we can continue to improve our services. Please take a few minutes to complete the written survey that you may receive in the mail after your visit with us. Thank you!             Your Updated Medication List - Protect others around you: Learn how to safely use, store and throw away your medicines at www.disposemymeds.org.          This list is accurate as of 4/11/18 11:59 PM.  Always use your most recent med list.                   Brand Name Dispense Instructions for use Diagnosis    ASPIRIN PO      Take 81 mg by mouth daily        lisinopril 10 MG tablet    PRINIVIL    90 tablet    Take 1 tablet (10 mg) by mouth daily    Essential hypertension       rosuvastatin 20 MG  tablet    CRESTOR    90 tablet    Take 1 tablet (20 mg) by mouth daily    Hyperlipidemia LDL goal <100

## 2018-04-11 NOTE — Clinical Note
Please abstract the following data from this visit with this patient into the appropriate field in Epic:  Colonoscopy done on this date: 11/24/2009 (approximately), by this group: [facility]

## 2018-04-13 ENCOUNTER — THERAPY VISIT (OUTPATIENT)
Dept: PHYSICAL THERAPY | Facility: CLINIC | Age: 58
End: 2018-04-13
Payer: COMMERCIAL

## 2018-04-13 DIAGNOSIS — M54.50 LUMBAGO: Primary | ICD-10-CM

## 2018-04-13 DIAGNOSIS — M79.672 BILATERAL FOOT PAIN: ICD-10-CM

## 2018-04-13 DIAGNOSIS — M79.671 BILATERAL FOOT PAIN: ICD-10-CM

## 2018-04-13 PROCEDURE — 97110 THERAPEUTIC EXERCISES: CPT | Mod: GP | Performed by: PHYSICAL THERAPIST

## 2018-04-13 PROCEDURE — 97161 PT EVAL LOW COMPLEX 20 MIN: CPT | Mod: GP | Performed by: PHYSICAL THERAPIST

## 2018-04-13 PROCEDURE — 97140 MANUAL THERAPY 1/> REGIONS: CPT | Mod: GP | Performed by: PHYSICAL THERAPIST

## 2018-04-13 NOTE — PROGRESS NOTES
[facility] Initial Evaluation  Subjective:  Patient is a 57 year old male presenting with rehab back hpi.   Claude Hayes is a 57 year old male with a lumbar condition.  Condition occurred with:  Insidious onset.  Condition occurred: during recreation/sport (pain in B low back w golf and running 10 min).  This is a chronic condition  3/13/18 flare up w golf/running    .    Patient reports pain:  Lumbar spine right and lumbar spine left.    Pain is described as stabbing, shooting and cramping and is intermittent and reported as 5/10.   Pain is worse in the P.M..  Symptoms are exacerbated by twisting, lifting and standing and relieved by activity/movement.  Since onset symptoms are unchanged.                                                      Objective:    Gait:  Weak glut control w lumber side shift in L wt bearing. decreased TL rotation in swing/gait          Flexibility/Screens:   Positive screens:  LumbarNegative screens: Hip or SI Joint         Lower Extremity:  Normal    Neurological: He has normal reflexes.            Lumbar/SI Evaluation  ROM:  Arom wnl lumbar: flex to toes/ ext 75%  Thoracic ext 50%      Lumbar Myotomes:  normal            Lumbar DTR's:  normal      Cord Signs:  not assessed    Lumbar Dermtomes:  normal                Neural Tension/Mobility:  Lumbar:  Normal            Lumbar Provocation:  normal      Spinal Segmental Conclusions: Hypomobile T4-12 and L1-5.    B ant translation of talus affecting B arch/foot pain                                                             General Evaluation:          Lower Extremity Flexibility:  normal                                                                             ROS    Assessment/Plan:    Patient is a 57 year old male with lumbar complaints.    Patient has the following significant findings with corresponding treatment plan.                Diagnosis 1:  B LBP  Pain -  manual therapy, self management, education,  directional preference exercise and home program  Decreased ROM/flexibility - manual therapy, therapeutic exercise and home program  Decreased joint mobility - manual therapy, therapeutic exercise and home program  Decreased strength - therapeutic exercise, therapeutic activities and home program  Decreased function - therapeutic activities and home program    Therapy Evaluation Codes:   1) History comprised of:   Personal factors that impact the plan of care:      None.    Comorbidity factors that impact the plan of care are:      None.     Medications impacting care: Anti-depressant and None.  2) Examination of Body Systems comprised of:   Body structures and functions that impact the plan of care:      Ankle and Lumbar spine.   Activity limitations that impact the plan of care are:      Lifting, Running, Sports and Standing.  3) Clinical presentation characteristics are:   Stable/Uncomplicated.  4) Decision-Making    Low complexity using standardized patient assessment instrument and/or measureable assessment of functional outcome.  Cumulative Therapy Evaluation is: Low complexity.    Previous and current functional limitations:  (See Goal Flow Sheet for this information)    Short term and Long term goals: (See Goal Flow Sheet for this information)     Communication ability:  Patient appears to be able to clearly communicate and understand verbal and written communication and follow directions correctly.  Treatment Explanation - The following has been discussed with the patient:   RX ordered/plan of care  Anticipated outcomes  Possible risks and side effects  This patient would benefit from PT intervention to resume normal activities.   Rehab potential is excellent.    Frequency:  1 X week, once daily  Duration:  for 6-8 visits  Discharge Plan:  Achieve all LTG.  Independent in home treatment program.  Reach maximal therapeutic benefit.    Please refer to the daily flowsheet for treatment today, total treatment  time and time spent performing 1:1 timed codes.

## 2018-04-13 NOTE — PROGRESS NOTES
[facility] Initial Evaluation  Subjective:  Patient is a 57 year old male presenting with rehab left ankle/foot hpi.                                      Pertinent medical history includes:  High blood pressure and heart problems.  Medical allergies: yes (seasonal ).  Other surgeries include:  None reported.  Current medications:  Cardiac medication and high blood pressure medication.  Current occupation is    .  Patient is working in normal job without restrictions.  Primary job tasks include:  Prolonged sitting.    Barriers include:  None as reported by patient.    Red flags:  None as reported by patient.      Oswestry Score: 12 %                 Objective:  System    Physical Exam    General     ROS    Assessment/Plan:

## 2018-04-18 ENCOUNTER — TELEPHONE (OUTPATIENT)
Dept: SURGERY | Facility: CLINIC | Age: 58
End: 2018-04-18

## 2018-04-18 NOTE — TELEPHONE ENCOUNTER
Called patient regarding his appt with Dr. Jay on 4/24/18 @ 9:30am. Per record, patient was seen in ED for diverticulitis, was treated with abx and sent home. This was patient's first episode of diverticulitis. Inform patient, Dr. Jay will see patient with diverticulitis, but usually to discuss surgery for diverticulitis and many of those patients has had many episodes of diverticulitis. Patient said this was his first time, he is not ready to jump into surgery and does not want to have surgery done. He will contact his internist provider and let him know. Patient states he still has some discomfort in his abdomen, advise him to follow up with his pcp to discuss possible follow up CT scan to confirm the diverticulitis has resolve. He will call us if he has any problems. Confirmed with patient, will cancel his appt with Dr. Jay. Patient states understanding and reasoning for appt cancellation.  Andreas AGUILERA LPN

## 2018-04-26 ENCOUNTER — THERAPY VISIT (OUTPATIENT)
Dept: PHYSICAL THERAPY | Facility: CLINIC | Age: 58
End: 2018-04-26
Payer: COMMERCIAL

## 2018-04-26 DIAGNOSIS — M54.50 CHRONIC BILATERAL LOW BACK PAIN WITHOUT SCIATICA: ICD-10-CM

## 2018-04-26 DIAGNOSIS — G89.29 CHRONIC BILATERAL LOW BACK PAIN WITHOUT SCIATICA: ICD-10-CM

## 2018-04-26 DIAGNOSIS — M79.671 BILATERAL FOOT PAIN: ICD-10-CM

## 2018-04-26 DIAGNOSIS — M79.672 BILATERAL FOOT PAIN: ICD-10-CM

## 2018-04-26 PROCEDURE — 97140 MANUAL THERAPY 1/> REGIONS: CPT | Mod: GP | Performed by: PHYSICAL THERAPIST

## 2018-04-26 PROCEDURE — 97110 THERAPEUTIC EXERCISES: CPT | Mod: GP | Performed by: PHYSICAL THERAPIST

## 2018-05-16 DIAGNOSIS — R94.5 ABNORMAL RESULTS OF LIVER FUNCTION STUDIES: Primary | ICD-10-CM

## 2018-05-28 NOTE — PROGRESS NOTES
REASON FOR CONSULTATION: liver lesions on CT   REFERRING PROVIDER:    A/P  Claude Hayes is a 57 year old male with incidental liver cysts.    Will review at tumor conference. These appear to be simple benign cysts and if so, will not require follow up. Reviewed CT with him and showed him images.    Recent diverticulitis with perforation. Discussed pathophysiology of diverticulitis.    Alivia Telles MD  Hepatology/Liver Transplant  Medical Director, Liver Transplantation  [facility]  Subjective  Claude Hayes is a 57 year old male referred for incidental liver lesions. He had CT on 3/5/18 for lower abdominal pain.   1. Sigmoid diverticulitis with suspected tiny localized area of perforation but no evidence for abscess.  2. Numerous hypodense liver lesions including cysts and some small lesions too small to characterize which could be very small cysts.  No other imaging in our system or outside records except CT heart calcium October 2017 noted few incidental cysts in liver.     Liver Function Studies -   Recent Labs   Lab Test  05/29/18   0740   PROTTOTAL  7.1   ALBUMIN  3.8   BILITOTAL  0.6   ALKPHOS  59   AST  34   ALT  37     CBC normal  HCV neg in 2014  TSH normal 2018  Colonoscopy 2009 pt reports 1 polyp (cannot see report)    He had a recent episode of diverticulitis with perforation. Was in hospital for 2 days.    Risk factors for fatty liver disease: HTN, hyperlipidemia  ETOH use: 1-2 drinks on occasion, maybe a few times per month    Past Medical History:   Diagnosis Date     Elevated cholesterol      Hypertension        Social History     Social History     Marital status:      Spouse name: N/A     Number of children: 2     Years of education: N/A     Occupational History     [employer]       Social History Main Topics     Smoking status: Never Smoker     Smokeless tobacco: Never Used     Alcohol use Yes      Comment: 1 drink a week     Drug use: No     Sexual  "activity: Yes     Partners: Female     Other Topics Concern     Not on file     Social History Narrative    , 2 children, 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 Was working out regularly up to 3 months ago.  (last updated 3/5/2018)        Family History   Problem Relation Age of Onset     Other - See Comments Mother      Old age,  age 98     HEART DISEASE Father      CABG age 54        ROS: 10 point ROS neg other than the symptoms noted above in the HPI.    /82  Pulse 69  Temp 98.2  F (36.8  C) (Oral)  Ht 1.778 m (5' 10\")  Wt 94.2 kg (207 lb 9.6 oz)  SpO2 97%  BMI 29.79 kg/m2  Constitutional: alert and no distress.   Neck: Neck supple. No adenopathy. Thyroid symmetric, normal size  HEENT:Normocephalic. No masses, lesions, tenderness or abnormalities. No temporal muscle wasting ENT exam normal, no neck nodes or sinus tenderness. No oral lesions  Cardiovascular: negative, No lifts, heaves, or thrills. RRR. No murmurs, clicks gallops or rub  Respiratory: negative, Good diaphragmatic excursion. Lungs clear. No wheezes or rales  Gastrointestinal: Abdomen soft, non-tender. BS normal.  No masses, organomegaly  Skin: no suspicious lesions or rashes. No spider angiomata or palmar erythema. Nails normal.  Neurologic: Alert and oriented x3. No asterixis or tremor. Gait normal.   Psychiatric:  Appropriate, well groomed.  Hematologic/Lymphatic/Immunologic: Normal cervical and supraclavicular  lymph nodes      "

## 2018-05-29 ENCOUNTER — OFFICE VISIT (OUTPATIENT)
Dept: GASTROENTEROLOGY | Facility: CLINIC | Age: 58
End: 2018-05-29
Attending: INTERNAL MEDICINE
Payer: COMMERCIAL

## 2018-05-29 VITALS
WEIGHT: 207.6 LBS | HEART RATE: 69 BPM | OXYGEN SATURATION: 97 % | DIASTOLIC BLOOD PRESSURE: 82 MMHG | HEIGHT: 70 IN | BODY MASS INDEX: 29.72 KG/M2 | TEMPERATURE: 98.2 F | SYSTOLIC BLOOD PRESSURE: 144 MMHG

## 2018-05-29 DIAGNOSIS — R94.5 ABNORMAL RESULTS OF LIVER FUNCTION STUDIES: ICD-10-CM

## 2018-05-29 DIAGNOSIS — K76.9 LIVER LESION: ICD-10-CM

## 2018-05-29 LAB
ALBUMIN SERPL-MCNC: 3.8 G/DL (ref 3.4–5)
ALP SERPL-CCNC: 59 U/L (ref 40–150)
ALT SERPL W P-5'-P-CCNC: 37 U/L (ref 0–70)
ANION GAP SERPL CALCULATED.3IONS-SCNC: 7 MMOL/L (ref 3–14)
AST SERPL W P-5'-P-CCNC: 34 U/L (ref 0–45)
BILIRUB DIRECT SERPL-MCNC: 0.1 MG/DL (ref 0–0.2)
BILIRUB SERPL-MCNC: 0.6 MG/DL (ref 0.2–1.3)
BUN SERPL-MCNC: 14 MG/DL (ref 7–30)
CALCIUM SERPL-MCNC: 9.1 MG/DL (ref 8.5–10.1)
CHLORIDE SERPL-SCNC: 107 MMOL/L (ref 94–109)
CO2 SERPL-SCNC: 26 MMOL/L (ref 20–32)
CREAT SERPL-MCNC: 0.77 MG/DL (ref 0.66–1.25)
ERYTHROCYTE [DISTWIDTH] IN BLOOD BY AUTOMATED COUNT: 12.8 % (ref 10–15)
GFR SERPL CREATININE-BSD FRML MDRD: >90 ML/MIN/1.7M2
GLUCOSE SERPL-MCNC: 110 MG/DL (ref 70–99)
HCT VFR BLD AUTO: 47.2 % (ref 40–53)
HGB BLD-MCNC: 16.4 G/DL (ref 13.3–17.7)
INR PPP: 1 (ref 0.86–1.14)
MCH RBC QN AUTO: 29.8 PG (ref 26.5–33)
MCHC RBC AUTO-ENTMCNC: 34.7 G/DL (ref 31.5–36.5)
MCV RBC AUTO: 86 FL (ref 78–100)
PLATELET # BLD AUTO: 322 10E9/L (ref 150–450)
POTASSIUM SERPL-SCNC: 4.2 MMOL/L (ref 3.4–5.3)
PROT SERPL-MCNC: 7.1 G/DL (ref 6.8–8.8)
RBC # BLD AUTO: 5.51 10E12/L (ref 4.4–5.9)
SODIUM SERPL-SCNC: 140 MMOL/L (ref 133–144)
WBC # BLD AUTO: 7.1 10E9/L (ref 4–11)

## 2018-05-29 PROCEDURE — 85610 PROTHROMBIN TIME: CPT | Performed by: INTERNAL MEDICINE

## 2018-05-29 PROCEDURE — 36415 COLL VENOUS BLD VENIPUNCTURE: CPT | Performed by: INTERNAL MEDICINE

## 2018-05-29 PROCEDURE — 85027 COMPLETE CBC AUTOMATED: CPT | Performed by: INTERNAL MEDICINE

## 2018-05-29 PROCEDURE — G0463 HOSPITAL OUTPT CLINIC VISIT: HCPCS | Mod: ZF

## 2018-05-29 PROCEDURE — 80048 BASIC METABOLIC PNL TOTAL CA: CPT | Performed by: INTERNAL MEDICINE

## 2018-05-29 PROCEDURE — 80076 HEPATIC FUNCTION PANEL: CPT | Performed by: INTERNAL MEDICINE

## 2018-05-29 ASSESSMENT — PAIN SCALES - GENERAL: PAINLEVEL: NO PAIN (0)

## 2018-05-29 NOTE — MR AVS SNAPSHOT
"              After Visit Summary   5/29/2018    Claude Hayes    MRN: [id number]           Patient Information     Date Of Birth          1960        Visit Information        Provider Department      5/29/2018 8:30 AM Alivia Telles MD [facility] Hepatology        Today's Diagnoses     Liver lesion           Follow-ups after your visit        Who to contact     If you have questions or need follow up information about today's clinic visit or your schedule please contact [facility]PAT[facility] directly at [phone].  Normal or non-critical lab and imaging results will be communicated to you by [employer], letter or phone within 4 business days after the clinic has received the results. If you do not hear from us within 7 days, please contact the clinic through [employer] or phone. If you have a critical or abnormal lab result, we will notify you by phone as soon as possible.  Submit refill requests through [employer] or call your pharmacy and they will forward the refill request to us. Please allow 3 business days for your refill to be completed.          Additional Information About Your Visit        MyChart Information     [employer] gives you secure access to your electronic health record. If you see a primary care provider, you can also send messages to your care team and make appointments. If you have questions, please call your primary care clinic.  If you do not have a primary care provider, please call [phone] and they will assist you.        Care EveryWhere ID     This is your Care EveryWhere ID. This could be used by other organizations to access your [place] medical records  [id number]        Your Vitals Were     Pulse Temperature Height Pulse Oximetry BMI (Body Mass Index)       69 98.2  F (36.8  C) (Oral) 1.778 m (5' 10\") 97% 29.79 kg/m2        Blood Pressure from Last 3 Encounters:   05/29/18 144/82   04/11/18 122/76   03/07/18 129/81    Weight from Last 3 Encounters:   05/29/18 94.2 " kg (207 lb 9.6 oz)   04/11/18 93.4 kg (206 lb)   03/05/18 90.7 kg (200 lb)              Today, you had the following     No orders found for display       Primary Care Provider Office Phone # Fax #    Fabiana Jesus Hernandez [phone] [address]  [place] [address]        Equal Access to Services     WAYNE [facility] Hospital[facility]KHDEEP : Hadii aad ku hadasho Soomaali, waaxda luqadaha, qaybta kaalmada adeegyada, waxay idiin hayaan adeeg khnadeen la'aan . So [facility] [phone].    ATENCIÓN: Si habla espdustin, tiene a smith disposición servicios gratuitos de asistencia lingüística. Llame al [phone].    We comply with applicable federal civil rights laws and [place] laws. We do not discriminate on the basis of race, color, national origin, age, disability, sex, sexual orientation, or gender identity.            Thank you!     Thank you for choosing [facility] HEPATOLOGY  for your care. Our goal is always to provide you with excellent care. Hearing back from our patients is one way we can continue to improve our services. Please take a few minutes to complete the written survey that you may receive in the mail after your visit with us. Thank you!             Your Updated Medication List - Protect others around you: Learn how to safely use, store and throw away your medicines at www.disposemymeds.org.          This list is accurate as of 5/29/18  8:36 AM.  Always use your most recent med list.                   Brand Name Dispense Instructions for use Diagnosis    ASPIRIN PO      Take 81 mg by mouth daily        lisinopril 10 MG tablet    PRINIVIL    90 tablet    Take 1 tablet (10 mg) by mouth daily    Essential hypertension       rosuvastatin 20 MG tablet    CRESTOR    90 tablet    Take 1 tablet (20 mg) by mouth daily    Hyperlipidemia LDL goal <100

## 2018-05-29 NOTE — LETTER
5/29/2018       RE: Claude Hayes  [address]  [facility] [address]     Dear Colleague,    Thank you for referring your patient, Claude Hayes, to the [facility] HEPATOLOGY at [facility]. Please see a copy of my visit note below.    REASON FOR CONSULTATION: liver lesions on CT   REFERRING PROVIDER:    A/P  Claude Hayes is a 57 year old male with incidental liver cysts.    Will review at tumor conference. These appear to be simple benign cysts and if so, will not require follow up. Reviewed CT with him and showed him images.    Recent diverticulitis with perforation. Discussed pathophysiology of diverticulitis.    Alivia Telles MD  Hepatology/Liver Transplant  Medical Director, Liver Transplantation  [facility]  Subjective  Claude Hayes is a 57 year old male referred for incidental liver lesions. He had CT on 3/5/18 for lower abdominal pain.   1. Sigmoid diverticulitis with suspected tiny localized area of perforation but no evidence for abscess.  2. Numerous hypodense liver lesions including cysts and some small lesions too small to characterize which could be very small cysts.  No other imaging in our system or outside records except CT heart calcium October 2017 noted few incidental cysts in liver.     Liver Function Studies -   Recent Labs   Lab Test  05/29/18   0740   PROTTOTAL  7.1   ALBUMIN  3.8   BILITOTAL  0.6   ALKPHOS  59   AST  34   ALT  37     CBC normal  HCV neg in 2014  TSH normal 2018  Colonoscopy 2009 pt reports 1 polyp (cannot see report)    He had a recent episode of diverticulitis with perforation. Was in hospital for 2 days.    Risk factors for fatty liver disease: HTN, hyperlipidemia  ETOH use: 1-2 drinks on occasion, maybe a few times per month    Past Medical History:   Diagnosis Date     Elevated cholesterol      Hypertension        Social History     Social History     Marital status:      Spouse  "name: N/A     Number of children: 2     Years of education: N/A     Occupational History     Christian       Social History Main Topics     Smoking status: Never Smoker     Smokeless tobacco: Never Used     Alcohol use Yes      Comment: 1 drink a week     Drug use: No     Sexual activity: Yes     Partners: Female     Other Topics Concern     Not on file     Social History Narrative    , 2 children, works as a Christian . Was working out regularly up to 3 months ago.  (last updated 3/5/2018)        Family History   Problem Relation Age of Onset     Other - See Comments Mother      Old age,  age 98     HEART DISEASE Father      CABG age 54        ROS: 10 point ROS neg other than the symptoms noted above in the HPI.    /82  Pulse 69  Temp 98.2  F (36.8  C) (Oral)  Ht 1.778 m (5' 10\")  Wt 94.2 kg (207 lb 9.6 oz)  SpO2 97%  BMI 29.79 kg/m2  Constitutional: alert and no distress.   Neck: Neck supple. No adenopathy. Thyroid symmetric, normal size  HEENT:Normocephalic. No masses, lesions, tenderness or abnormalities. No temporal muscle wasting ENT exam normal, no neck nodes or sinus tenderness. No oral lesions  Cardiovascular: negative, No lifts, heaves, or thrills. RRR. No murmurs, clicks gallops or rub  Respiratory: negative, Good diaphragmatic excursion. Lungs clear. No wheezes or rales  Gastrointestinal: Abdomen soft, non-tender. BS normal.  No masses, organomegaly  Skin: no suspicious lesions or rashes. No spider angiomata or palmar erythema. Nails normal.  Neurologic: Alert and oriented x3. No asterixis or tremor. Gait normal.   Psychiatric:  Appropriate, well groomed.  Hematologic/Lymphatic/Immunologic: Normal cervical and supraclavicular  lymph nodes        Again, thank you for allowing me to participate in the care of your patient.      Sincerely,    Alivia Telles MD      "

## 2018-05-29 NOTE — LETTER
5/29/2018      RE: Claude Hayes  [address]  [facility] [address]       REASON FOR CONSULTATION: liver lesions on CT   REFERRING PROVIDER:    A/P  Claude Hayes is a 57 year old male with incidental liver cysts.    Will review at tumor conference. These appear to be simple benign cysts and if so, will not require follow up. Reviewed CT with him and showed him images.    Recent diverticulitis with perforation. Discussed pathophysiology of diverticulitis.    Alivia Telles MD  Hepatology/Liver Transplant  Medical Director, Liver Transplantation  [facility]  Subjective  Claude Hayes is a 57 year old male referred for incidental liver lesions. He had CT on 3/5/18 for lower abdominal pain.   1. Sigmoid diverticulitis with suspected tiny localized area of perforation but no evidence for abscess.  2. Numerous hypodense liver lesions including cysts and some small lesions too small to characterize which could be very small cysts.  No other imaging in our system or outside records except CT heart calcium October 2017 noted few incidental cysts in liver.     Liver Function Studies -   Recent Labs   Lab Test  05/29/18   0740   PROTTOTAL  7.1   ALBUMIN  3.8   BILITOTAL  0.6   ALKPHOS  59   AST  34   ALT  37     CBC normal  HCV neg in 2014  TSH normal 2018  Colonoscopy 2009 pt reports 1 polyp (cannot see report)    He had a recent episode of diverticulitis with perforation. Was in hospital for 2 days.    Risk factors for fatty liver disease: HTN, hyperlipidemia  ETOH use: 1-2 drinks on occasion, maybe a few times per month    Past Medical History:   Diagnosis Date     Elevated cholesterol      Hypertension        Social History     Social History     Marital status:      Spouse name: N/A     Number of children: 2     Years of education: N/A     Occupational History     Adventism       Social History Main Topics     Smoking status: Never Smoker     Smokeless tobacco: Never  "Used     Alcohol use Yes      Comment: 1 drink a week     Drug use: No     Sexual activity: Yes     Partners: Female     Other Topics Concern     Not on file     Social History Narrative    , 2 children, works as a [employer] . Was working out regularly up to 3 months ago.  (last updated 3/5/2018)        Family History   Problem Relation Age of Onset     Other - See Comments Mother      Old age,  age 98     HEART DISEASE Father      CABG age 54        ROS: 10 point ROS neg other than the symptoms noted above in the HPI.    /82  Pulse 69  Temp 98.2  F (36.8  C) (Oral)  Ht 1.778 m (5' 10\")  Wt 94.2 kg (207 lb 9.6 oz)  SpO2 97%  BMI 29.79 kg/m2  Constitutional: alert and no distress.   Neck: Neck supple. No adenopathy. Thyroid symmetric, normal size  HEENT:Normocephalic. No masses, lesions, tenderness or abnormalities. No temporal muscle wasting ENT exam normal, no neck nodes or sinus tenderness. No oral lesions  Cardiovascular: negative, No lifts, heaves, or thrills. RRR. No murmurs, clicks gallops or rub  Respiratory: negative, Good diaphragmatic excursion. Lungs clear. No wheezes or rales  Gastrointestinal: Abdomen soft, non-tender. BS normal.  No masses, organomegaly  Skin: no suspicious lesions or rashes. No spider angiomata or palmar erythema. Nails normal.  Neurologic: Alert and oriented x3. No asterixis or tremor. Gait normal.   Psychiatric:  Appropriate, well groomed.  Hematologic/Lymphatic/Immunologic: Normal cervical and supraclavicular  lymph nodes        Alivia Telles MD      "

## 2018-05-29 NOTE — NURSING NOTE
Chief Complaint   Patient presents with     Consult     Liver Lesions   Pt roomed, vitals, meds, and allergies reviewed with pt. Pt ready for provider.  Thomas Singleton, CMA

## 2018-06-01 ENCOUNTER — TELEPHONE (OUTPATIENT)
Dept: GASTROENTEROLOGY | Facility: CLINIC | Age: 58
End: 2018-06-01

## 2018-06-01 NOTE — TELEPHONE ENCOUNTER
----- Message from Alivia Telles MD sent at 6/1/2018  2:41 PM CDT -----  Please call him and let him know we reviewed his images. Appears to be several small simple cysts. No follow up required.

## 2018-08-24 ENCOUNTER — APPOINTMENT (OUTPATIENT)
Dept: CT IMAGING | Facility: CLINIC | Age: 58
End: 2018-08-24
Attending: EMERGENCY MEDICINE
Payer: COMMERCIAL

## 2018-08-24 ENCOUNTER — HOSPITAL ENCOUNTER (EMERGENCY)
Facility: CLINIC | Age: 58
Discharge: HOME | End: 2018-08-24
Attending: EMERGENCY MEDICINE | Admitting: EMERGENCY MEDICINE
Payer: COMMERCIAL

## 2018-08-24 VITALS
RESPIRATION RATE: 16 BRPM | BODY MASS INDEX: 28.63 KG/M2 | TEMPERATURE: 98.3 F | OXYGEN SATURATION: 97 % | DIASTOLIC BLOOD PRESSURE: 90 MMHG | WEIGHT: 200 LBS | HEIGHT: 70 IN | HEART RATE: 76 BPM | SYSTOLIC BLOOD PRESSURE: 144 MMHG

## 2018-08-24 DIAGNOSIS — K57.32 DIVERTICULITIS OF COLON: ICD-10-CM

## 2018-08-24 LAB
ALBUMIN SERPL-MCNC: 3.7 G/DL (ref 3.4–5)
ALBUMIN UR-MCNC: NEGATIVE MG/DL
ALP SERPL-CCNC: 62 U/L (ref 40–150)
ALT SERPL W P-5'-P-CCNC: 26 U/L (ref 0–70)
ANION GAP SERPL CALCULATED.3IONS-SCNC: 8 MMOL/L (ref 3–14)
APPEARANCE UR: CLEAR
AST SERPL W P-5'-P-CCNC: 17 U/L (ref 0–45)
BASOPHILS # BLD AUTO: 0 10E9/L (ref 0–0.2)
BASOPHILS NFR BLD AUTO: 0.2 %
BILIRUB SERPL-MCNC: 0.8 MG/DL (ref 0.2–1.3)
BILIRUB UR QL STRIP: NEGATIVE
BUN SERPL-MCNC: 11 MG/DL (ref 7–30)
CALCIUM SERPL-MCNC: 8.4 MG/DL (ref 8.5–10.1)
CHLORIDE SERPL-SCNC: 105 MMOL/L (ref 94–109)
CO2 SERPL-SCNC: 26 MMOL/L (ref 20–32)
COLOR UR AUTO: ABNORMAL
CREAT SERPL-MCNC: 0.76 MG/DL (ref 0.66–1.25)
DIFFERENTIAL METHOD BLD: ABNORMAL
EOSINOPHIL # BLD AUTO: 0.1 10E9/L (ref 0–0.7)
EOSINOPHIL NFR BLD AUTO: 0.9 %
ERYTHROCYTE [DISTWIDTH] IN BLOOD BY AUTOMATED COUNT: 12.9 % (ref 10–15)
GFR SERPL CREATININE-BSD FRML MDRD: >90 ML/MIN/1.7M2
GLUCOSE SERPL-MCNC: 104 MG/DL (ref 70–99)
GLUCOSE UR STRIP-MCNC: NEGATIVE MG/DL
HCT VFR BLD AUTO: 45.7 % (ref 40–53)
HGB BLD-MCNC: 15.8 G/DL (ref 13.3–17.7)
HGB UR QL STRIP: NEGATIVE
IMM GRANULOCYTES # BLD: 0.1 10E9/L (ref 0–0.4)
IMM GRANULOCYTES NFR BLD: 0.4 %
KETONES UR STRIP-MCNC: NEGATIVE MG/DL
LEUKOCYTE ESTERASE UR QL STRIP: NEGATIVE
LIPASE SERPL-CCNC: 154 U/L (ref 73–393)
LYMPHOCYTES # BLD AUTO: 2.8 10E9/L (ref 0.8–5.3)
LYMPHOCYTES NFR BLD AUTO: 20.2 %
MCH RBC QN AUTO: 29.9 PG (ref 26.5–33)
MCHC RBC AUTO-ENTMCNC: 34.6 G/DL (ref 31.5–36.5)
MCV RBC AUTO: 86 FL (ref 78–100)
MONOCYTES # BLD AUTO: 1.2 10E9/L (ref 0–1.3)
MONOCYTES NFR BLD AUTO: 8.9 %
MUCOUS THREADS #/AREA URNS LPF: PRESENT /LPF
NEUTROPHILS # BLD AUTO: 9.6 10E9/L (ref 1.6–8.3)
NEUTROPHILS NFR BLD AUTO: 69.4 %
NITRATE UR QL: NEGATIVE
NRBC # BLD AUTO: 0 10*3/UL
NRBC BLD AUTO-RTO: 0 /100
PH UR STRIP: 7 PH (ref 5–7)
PLATELET # BLD AUTO: 271 10E9/L (ref 150–450)
POTASSIUM SERPL-SCNC: 3.9 MMOL/L (ref 3.4–5.3)
PROT SERPL-MCNC: 7.2 G/DL (ref 6.8–8.8)
RBC # BLD AUTO: 5.29 10E12/L (ref 4.4–5.9)
RBC #/AREA URNS AUTO: <1 /HPF (ref 0–2)
SODIUM SERPL-SCNC: 139 MMOL/L (ref 133–144)
SOURCE: ABNORMAL
SP GR UR STRIP: 1 (ref 1–1.03)
UROBILINOGEN UR STRIP-MCNC: NORMAL MG/DL (ref 0–2)
WBC # BLD AUTO: 13.8 10E9/L (ref 4–11)
WBC #/AREA URNS AUTO: <1 /HPF (ref 0–5)

## 2018-08-24 PROCEDURE — 74177 CT ABD & PELVIS W/CONTRAST: CPT

## 2018-08-24 PROCEDURE — 80053 COMPREHEN METABOLIC PANEL: CPT | Performed by: EMERGENCY MEDICINE

## 2018-08-24 PROCEDURE — 25000128 H RX IP 250 OP 636: Performed by: EMERGENCY MEDICINE

## 2018-08-24 PROCEDURE — 81001 URINALYSIS AUTO W/SCOPE: CPT | Performed by: EMERGENCY MEDICINE

## 2018-08-24 PROCEDURE — 85025 COMPLETE CBC W/AUTO DIFF WBC: CPT | Performed by: EMERGENCY MEDICINE

## 2018-08-24 PROCEDURE — 99285 EMERGENCY DEPT VISIT HI MDM: CPT | Mod: 25

## 2018-08-24 PROCEDURE — 83690 ASSAY OF LIPASE: CPT | Performed by: EMERGENCY MEDICINE

## 2018-08-24 PROCEDURE — 96360 HYDRATION IV INFUSION INIT: CPT | Mod: 59

## 2018-08-24 RX ORDER — CIPROFLOXACIN 500 MG/1
500 TABLET, FILM COATED ORAL 2 TIMES DAILY
Qty: 20 TABLET | Refills: 0 | Status: SHIPPED | OUTPATIENT
Start: 2018-08-24 | End: 2019-02-12

## 2018-08-24 RX ORDER — HYDROMORPHONE HYDROCHLORIDE 1 MG/ML
0.5 INJECTION, SOLUTION INTRAMUSCULAR; INTRAVENOUS; SUBCUTANEOUS
Status: DISCONTINUED | OUTPATIENT
Start: 2018-08-24 | End: 2018-08-25 | Stop reason: HOSPADM

## 2018-08-24 RX ORDER — METRONIDAZOLE 500 MG/1
500 TABLET ORAL 2 TIMES DAILY
Qty: 20 TABLET | Refills: 0 | Status: SHIPPED | OUTPATIENT
Start: 2018-08-24 | End: 2018-12-30

## 2018-08-24 RX ORDER — ONDANSETRON 2 MG/ML
4 INJECTION INTRAMUSCULAR; INTRAVENOUS EVERY 30 MIN PRN
Status: DISCONTINUED | OUTPATIENT
Start: 2018-08-24 | End: 2018-08-25 | Stop reason: HOSPADM

## 2018-08-24 RX ORDER — IOPAMIDOL 755 MG/ML
101 INJECTION, SOLUTION INTRAVASCULAR ONCE
Status: COMPLETED | OUTPATIENT
Start: 2018-08-24 | End: 2018-08-24

## 2018-08-24 RX ORDER — SODIUM CHLORIDE 9 MG/ML
1000 INJECTION, SOLUTION INTRAVENOUS CONTINUOUS
Status: DISCONTINUED | OUTPATIENT
Start: 2018-08-24 | End: 2018-08-25 | Stop reason: HOSPADM

## 2018-08-24 RX ADMIN — SODIUM CHLORIDE, PRESERVATIVE FREE 71 ML: 5 INJECTION INTRAVENOUS at 22:10

## 2018-08-24 RX ADMIN — SODIUM CHLORIDE 1000 ML: 9 INJECTION, SOLUTION INTRAVENOUS at 21:44

## 2018-08-24 RX ADMIN — IOPAMIDOL 101 ML: 755 INJECTION, SOLUTION INTRAVENOUS at 22:10

## 2018-08-24 ASSESSMENT — ENCOUNTER SYMPTOMS
NAUSEA: 0
VOMITING: 0
ABDOMINAL PAIN: 1
CONSTIPATION: 0
FEVER: 0
APPETITE CHANGE: 0
CHILLS: 1

## 2018-08-24 NOTE — ED AVS SNAPSHOT
Emergency Department    [address] [facility] [address]    Phone:  [phone]    Fax:  [phone]                                       Claude Hayes   MRN: [id number]    Department:   Emergency Department   Date of Visit:  8/24/2018           Patient Information     Date Of Birth          1960        Your diagnoses for this visit were:     Diverticulitis of colon        You were seen by Jeanne Kim MD.      Follow-up Information     Follow up with Fabiana Hernandez MD.    Specialty:  Internal Medicine    Why:  this week    Contact information:    [address] [facility] [place] [id number] [phone]          Discharge Instructions       Discharge Instructions  Diverticulitis  Your provider has diagnosed you with diverticulitis.  Diverticulitis is an infection of a diverticulum, which is a tiny sack-like structure that protrudes off the wall of the colon (large intestine).  These sacks are created over years of increased pressure in the colon (this is diverticulosis), usually as a result of a diet without enough fruits, vegetables, and whole grains. Because these sacks are small, bacteria can get trapped inside them and cause an infection (this is divericulitis).  This infection often causes abdominal (belly) pain, fever, nausea (sick to stomach), and vomiting (throwing up). Diverticulitis is usually treated at home.  However, sometimes diverticulitis needs treatment in the hospital and may even need surgery.    Generally, every Emergency Department visit should have a follow-up clinic visit with either a primary or a specialty clinic/provider. Please follow-up as instructed by your emergency provider today.    Return to the Emergency Department if:     You get an oral temperature above 102oF or as directed by your provider.    You have blood in your stools (bright red or black, tarry stools), or have blood in your vomit.    You keep vomiting or cannot drink liquids or  "cannot keep your medicine down.    You cannot have a bowel movement or you cannot pass gas.    Your stomach gets bloated or bigger.    You faint or become very weak.    Your pain is too bad to tolerate.    You have new symptoms or anything that worries you.    What can I do to help myself?    Fill any antibiotic prescriptions the provider gave you and take them right away. Be sure to finish the whole antibiotic prescription.    For the first day or two at home drink only clear liquids.  This lets your intestines rest.    If your pain has improved after one or two days, you may start eating mild foods. Soda crackers, toast, plain noodles, gelatin, applesauce and bananas are good first choices.  Avoid foods that have acid, are spicy, fatty or fibrous (such as meats, coarse grains, vegetables). You may start eating these foods again in about 3-4 days when you are better.    Once you are back to normal, eat a high fiber diet of fruits, vegetables and whole grains. Some people think you should avoid eating nuts, seeds, and corn, but there is no definite proof this makes any difference in whether you will get diverticulitis again.     Pain and fever can be treated with Tylenol  (acetaminophen) or you might have been prescribed a pain medication.    Probiotics: If you have been given an antibiotic, you may want to also take a probiotic pill or eat yogurt with live cultures. Probiotics have \"good bacteria\" to help your intestines stay healthy. Studies have shown that probiotics help prevent diarrhea and other intestine problems (including C. diff infection) when you take antibiotics. You can buy these without a prescription in the pharmacy section of the store.  If you were given a prescription for medicine here today, be sure to read all of the information (including the package insert) that comes with your prescription.  This will include important information about the medicine, its side effects, and any warnings that " you need to know about.  The pharmacist who fills the prescription can provide more information and answer questions you may have about the medicine.  If you have questions or concerns that the pharmacist cannot address, please call or return to the Emergency Department.     Remember that you can always come back to the Emergency Department if you are not able to see your regular provider in the amount of time listed above, if you get any new symptoms, or if there is anything that worries you.    24 Hour Appointment Hotline       To make an appointment at any [facility], call [phone] ([phone]). If you don't have a family doctor or clinic, we will help you find one. [place] clinics are conveniently located to serve the needs of you and your family.             Review of your medicines      START taking        Dose / Directions Last dose taken    ciprofloxacin 500 MG tablet   Commonly known as:  CIPRO   Dose:  500 mg   Quantity:  20 tablet        Take 1 tablet (500 mg) by mouth 2 times daily for 10 days   Refills:  0        metroNIDAZOLE 500 MG tablet   Commonly known as:  FLAGYL   Dose:  500 mg   Quantity:  20 tablet        Take 1 tablet (500 mg) by mouth 2 times daily   Refills:  0          Our records show that you are taking the medicines listed below. If these are incorrect, please call your family doctor or clinic.        Dose / Directions Last dose taken    ASPIRIN PO   Dose:  81 mg        Take 81 mg by mouth daily   Refills:  0        lisinopril 10 MG tablet   Commonly known as:  PRINIVIL   Dose:  10 mg   Quantity:  90 tablet        Take 1 tablet (10 mg) by mouth daily   Refills:  3        rosuvastatin 20 MG tablet   Commonly known as:  CRESTOR   Dose:  20 mg   Quantity:  90 tablet        Take 1 tablet (20 mg) by mouth daily   Refills:  3                Prescriptions were sent or printed at these locations (2 Prescriptions)                   Other Prescriptions                Printed at  Department/Unit printer (2 of 2)         ciprofloxacin (CIPRO) 500 MG tablet               metroNIDAZOLE (FLAGYL) 500 MG tablet                Procedures and tests performed during your visit     CBC with platelets differential    CT Abdomen Pelvis w Contrast    Comprehensive metabolic panel    Give 20 ounces of water 15 minutes before CT of abdomen    Lipase    UA with Microscopic      Orders Needing Specimen Collection     None      Pending Results     No orders found from 8/22/2018 to 8/25/2018.            Pending Culture Results     No orders found from 8/22/2018 to 8/25/2018.            Pending Results Instructions     If you had any lab results that were not finalized at the time of your Discharge, you can call the ED Lab Result RN at [phone]. You will be contacted by this team for any positive Lab results or changes in treatment. The nurses are available 7 days a week from 10A to 6:30P.  You can leave a message 24 hours per day and they will return your call.        Test Results From Your Hospital Stay        8/24/2018  9:59 PM      Component Results     Component Value Ref Range & Units Status    WBC 13.8 (H) 4.0 - 11.0 10e9/L Final    RBC Count 5.29 4.4 - 5.9 10e12/L Final    Hemoglobin 15.8 13.3 - 17.7 g/dL Final    Hematocrit 45.7 40.0 - 53.0 % Final    MCV 86 78 - 100 fl Final    MCH 29.9 26.5 - 33.0 pg Final    MCHC 34.6 31.5 - 36.5 g/dL Final    RDW 12.9 10.0 - 15.0 % Final    Platelet Count 271 150 - 450 10e9/L Final    Diff Method Automated Method  Final    % Neutrophils 69.4 % Final    % Lymphocytes 20.2 % Final    % Monocytes 8.9 % Final    % Eosinophils 0.9 % Final    % Basophils 0.2 % Final    % Immature Granulocytes 0.4 % Final    Nucleated RBCs 0 0 /100 Final    Absolute Neutrophil 9.6 (H) 1.6 - 8.3 10e9/L Final    Absolute Lymphocytes 2.8 0.8 - 5.3 10e9/L Final    Absolute Monocytes 1.2 0.0 - 1.3 10e9/L Final    Absolute Eosinophils 0.1 0.0 - 0.7 10e9/L Final    Absolute Basophils 0.0 0.0  - 0.2 10e9/L Final    Abs Immature Granulocytes 0.1 0 - 0.4 10e9/L Final    Absolute Nucleated RBC 0.0  Final         8/24/2018 10:17 PM      Component Results     Component Value Ref Range & Units Status    Sodium 139 133 - 144 mmol/L Final    Potassium 3.9 3.4 - 5.3 mmol/L Final    Chloride 105 94 - 109 mmol/L Final    Carbon Dioxide 26 20 - 32 mmol/L Final    Anion Gap 8 3 - 14 mmol/L Final    Glucose 104 (H) 70 - 99 mg/dL Final    Urea Nitrogen 11 7 - 30 mg/dL Final    Creatinine 0.76 0.66 - 1.25 mg/dL Final    GFR Estimate >90 >60 mL/min/1.7m2 Final    Non  GFR Calc    GFR Estimate If Black >90 >60 mL/min/1.7m2 Final    African American GFR Calc    Calcium 8.4 (L) 8.5 - 10.1 mg/dL Final    Bilirubin Total 0.8 0.2 - 1.3 mg/dL Final    Albumin 3.7 3.4 - 5.0 g/dL Final    Protein Total 7.2 6.8 - 8.8 g/dL Final    Alkaline Phosphatase 62 40 - 150 U/L Final    ALT 26 0 - 70 U/L Final    AST 17 0 - 45 U/L Final         8/24/2018 10:15 PM      Component Results     Component Value Ref Range & Units Status    Lipase 154 73 - 393 U/L Final         8/24/2018 10:04 PM      Component Results     Component Value Ref Range & Units Status    Color Urine Straw  Final    Appearance Urine Clear  Final    Glucose Urine Negative NEG^Negative mg/dL Final    Bilirubin Urine Negative NEG^Negative Final    Ketones Urine Negative NEG^Negative mg/dL Final    Specific Gravity Urine 1.004 1.003 - 1.035 Final    Blood Urine Negative NEG^Negative Final    pH Urine 7.0 5.0 - 7.0 pH Final    Protein Albumin Urine Negative NEG^Negative mg/dL Final    Urobilinogen mg/dL Normal 0.0 - 2.0 mg/dL Final    Nitrite Urine Negative NEG^Negative Final    Leukocyte Esterase Urine Negative NEG^Negative Final    Source Midstream Urine  Final    WBC Urine <1 0 - 5 /HPF Final    RBC Urine <1 0 - 2 /HPF Final    Mucous Urine Present (A) NEG^Negative /LPF Final         8/24/2018 10:27 PM      Narrative     CT ABDOMEN AND PELVIS WITH CONTRAST    8/24/2018 10:19 PM     HISTORY: Lower abdominal pain, history of div.    TECHNIQUE:  CT abdomen and pelvis with 101 mL Isovue-370 IV. Radiation  dose for this scan was reduced using automated exposure control,  adjustment of the mA and/or kV according to patient size, or iterative  reconstruction technique.    COMPARISON: 3/5/2018.    FINDINGS:  Abdomen: There are a few bands of scar or atelectasis at the lung  bases. There are several low-attenuation liver lesions which are  similar to the previous exam and consistent with cysts. The spleen,  gallbladder, pancreas, adrenal glands and kidneys are normal in  appearance. There is no abdominal or pelvic lymph node enlargement.    Pelvis: There are sigmoid diverticula. A segment of sigmoid colon in  the anterior pelvis is thick-walled and surrounded by inflammation  consistent with acute diverticulitis. There is no perforation or  abscess formation. There is no bowel obstruction. The appendix is  normal. No free intraperitoneal gas or fluid.        Impression     IMPRESSION: Sigmoid diverticulitis. No perforation or abscess.    ZOIE ABDI MD                Clinical Quality Measure: Blood Pressure Screening     Your blood pressure was checked while you were in the emergency department today. The last reading we obtained was  BP: 137/76 . Please read the guidelines below about what these numbers mean and what you should do about them.  If your systolic blood pressure (the top number) is less than 120 and your diastolic blood pressure (the bottom number) is less than 80, then your blood pressure is normal. There is nothing more that you need to do about it.  If your systolic blood pressure (the top number) is 120-139 or your diastolic blood pressure (the bottom number) is 80-89, your blood pressure may be higher than it should be. You should have your blood pressure rechecked within a year by a primary care provider.  If your systolic blood pressure (the top number)  is 140 or greater or your diastolic blood pressure (the bottom number) is 90 or greater, you may have high blood pressure. High blood pressure is treatable, but if left untreated over time it can put you at risk for heart attack, stroke, or kidney failure. You should have your blood pressure rechecked by a primary care provider within the next 4 weeks.  If your provider in the emergency department today gave you specific instructions to follow-up with your doctor or provider even sooner than that, you should follow that instruction and not wait for up to 4 weeks for your follow-up visit.        Thank you for choosing [facility]       Thank you for choosing [facility] for your care. Our goal is always to provide you with excellent care. Hearing back from our patients is one way we can continue to improve our services. Please take a few minutes to complete the written survey that you may receive in the mail after you visit with us. Thank you!        [employer] Information     Here@ Networks gives you secure access to your electronic health record. If you see a primary care provider, you can also send messages to your care team and make appointments. If you have questions, please call your primary care clinic.  If you do not have a primary care provider, please call [phone] and they will assist you.        Care EveryWhere ID     This is your Care EveryWhere ID. This could be used by other organizations to access your [facility] medical records  [id number]        Equal Access to Services     EZRA LEE : Hadii estefany Schulz, wacecilleda luchika, qaybta kaalpanda georges, fuentes tyler. So [facility] [phone].    ATENCIÓN: Si habla español, tiene a smith disposición servicios gratuitos de asistencia lingüística. Llame al [phone].    We comply with applicable federal civil rights laws and [place] laws. We do not discriminate on the basis of race, color, national origin, age, disability, sex,  sexual orientation, or gender identity.            After Visit Summary       This is your record. Keep this with you and show to your community pharmacist(s) and doctor(s) at your next visit.

## 2018-08-24 NOTE — ED AVS SNAPSHOT
Emergency Department    [address] [place] [address]    Phone:  [phone]    Fax:  [phone]                                       Claude Hayes   MRN: [id number]    Department:   Emergency Department   Date of Visit:  8/24/2018           After Visit Summary Signature Page     I have received my discharge instructions, and my questions have been answered. I have discussed any challenges I see with this plan with the nurse or doctor.    ..........................................................................................................................................  Patient/Patient Representative Signature      ..........................................................................................................................................  Patient Representative Print Name and Relationship to Patient    ..................................................               ................................................  Date                                            Time    ..........................................................................................................................................  Reviewed by Signature/Title    ...................................................              ..............................................  Date                                                            Time          22EPIC Rev 08/18

## 2018-08-25 NOTE — ED PROVIDER NOTES
History     Chief Complaint:  Abdominal pain    HPI   Claude Hayes is a 57 year old male with a history of hospitalization for diverticulitis (see note below) who presents with lower abdominal pain. The patient states that for the last 3 days he has been experiencing lower abdominal pain. He also reports chills, but denies any fever, nausea, vomiting, or appetite change. He has not had a bowel movement today, but also notes that he has avoided eating today. He had multiple bowel movements yesterday. The patient rates his pain as a 5 out of 10 in severity. He notes that since his last bout of diverticulitis he has tried to increase his amount of dietary fiber and he has had episodes of abdominal pain but they never have lasted longer than a day until this current episode. His sister also has had diverticulitis.      3/7/18 Discharge summary  Discharge Diagnoses      1. Diverticulitis w/perforation and sepsis  2. Liver lesions  3. HTN   4. HLP         History of Present Illness     Claude Hayes is an 57 year old male with a history of hypertension and HLP but otherwise healthy who presented with abdominal pain that started 2 to 3 days prior, mild subjective fever with chills, and a temp on the day of admission of 101.0, slight decreased appetite.  He denied any vomiting and is still passing gas but no bowel movement.          Hospital Course     Claude Hayes was admitted on 3/5/2018.  The following problems were addressed during his hospitalization:     Upon arrival in the ED an abdominal CT shows small localized perforated sigmoid diverticulitis.  Initial lab work showed a slight leukocytosis of 15.0 and he had a documented fever of 101 at home.  Patient was started on IV antibiotics with Zosyn and surgery was consulted.  They recommended medical management and followed along.  Patient was able to tolerate a liquid diet and on the day of discharge he was pain free without the use of medications.   "He was switched to oral antibiotics and discharged to home with Cipro and Flagyl for 10 days.       Allergies:  Seasonal allergies     Medications:    Aspirin  Prinivil  Crestor    Problem List:    Lumbarg     Past Medical History:    Hypertension  Elevated cholesterol  Diverticulitis  Calcification of coronary artery  Lumbargo      Past Surgical History:    Tonsillectomy    Family History:    Heart disease    Social History:  Patient presents with his wife, Nelida   He works as a [employer]  at [employer] the [employer]  Negative for tobacco use and recreational drug use  Occasional alcohol use  Marital Status:        Review of Systems   Constitutional: Positive for chills. Negative for appetite change and fever.   Gastrointestinal: Positive for abdominal pain. Negative for constipation, nausea and vomiting.   All other systems reviewed and are negative.      Physical Exam   First Vitals:  BP: 137/76  Pulse: 79  Temp: 98.3  F (36.8  C)  Resp: 16  Height: 177.8 cm (5' 10\")  Weight: 90.7 kg (200 lb)  SpO2: 96 %      Physical Exam  Constitutional:  Oriented to person, place, and time.      Appears well-developed and well-nourished.   HENT:   Head:    Normocephalic and atraumatic.   Right Ear:   Tympanic membrane and external ear normal.   Left Ear:   Tympanic membrane and external ear normal.   Mouth/Throat:   Oropharynx is clear and moist.      Mucous membranes are normal.   Eyes:    Conjunctivae normal and EOM are normal.      Pupils are equal, round, and reactive to light.   Neck:    Normal range of motion. Neck supple.   Cardiovascular:  Normal rate, regular rhythm, S1 normal and S2 normal.      No gallop and no friction rub. No murmur heard.  Pulmonary/Chest:  Breath sounds normal. No respiratory distress.      No wheezes. No rhonchi. No rales.   Abdominal:   Soft. No hepatosplenomegaly. Tenderness across lower abdomen with no    guarding     No rebound and no CVA tenderness.   Musculoskeletal: "  Normal range of motion.   Neurological:   Alert and oriented to person, place, and time. Normal strength.      GCS eye subscore is 4. GCS verbal subscore is 5.      GCS motor subscore is 6.   Skin:    Skin is warm and dry.   Psychiatric:   Normal mood and affect.      Speech is normal and behavior is normal.      Judgment and thought content normal.      Cognition and memory are normal.       Emergency Department Course     Imaging:  Radiographic findings were communicated with the patient who voiced understanding of the findings.  CT Abdomen/Pelvis with IV contrast:   Sigmoid diverticulitis. No perforation or abscess.  per radiology.     Laboratory:  UA with micro: Mucous: present o/w negative  CBC: WBC: 13.8 (H), HGB: 15.8, PLT: 271  CMP: Glucose 104 (H), Calcium: 8.4 (L), o/w WNL (Creatinine: 0.76)  Lipase: 154    Interventions:  2144 - NS 1L IV      Emergency Department Course:  Nursing notes and vitals reviewed.  2142: I performed an exam of the patient as documented above. IV inserted and blood drawn.  Labs and imaging ordered.    2230: I rechecked the patient. Findings and plan explained to the Patient. Patient discharged home with instructions regarding supportive care, medications, and reasons to return. The importance of close follow-up was reviewed.        Impression & Plan      Medical Decision Making:  Patient is a 57-year-old male with diverticulitis earlier this year who presents with similar symptoms over the last 3 days.  CT scan showed sigmoid diverticulitis.  He actually looks quite well, says he has had good appetite, has not had nausea or vomiting.  He will be treated as an outpatient.  I did discuss option of Augmentin versus the Cipro and Flagyl which he has had previously and possible side effects of medications.  He did decide he still wanted to use the Cipro and Flagyl as he said that was effective for him previously.  We did discuss possible side effects of those medications.  He will  follow-up with his primary this week.      Diagnosis:    ICD-10-CM    1. Diverticulitis of colon K57.32        Disposition:  discharged to home    Discharge Medications:  Discharge Medication List as of 8/24/2018 10:57 PM      START taking these medications    Details   ciprofloxacin (CIPRO) 500 MG tablet Take 1 tablet (500 mg) by mouth 2 times daily for 10 days, Disp-20 tablet, R-0, Local Print      metroNIDAZOLE (FLAGYL) 500 MG tablet Take 1 tablet (500 mg) by mouth 2 times daily, Disp-20 tablet, R-0, Local Print           IBart, am serving as a scribe on 8/24/2018 at 9:42 PM to personally document services performed by Jeanne Kim MD based on my observations and the provider's statements to me.       Bart Vincent  8/24/2018    EMERGENCY DEPARTMENT       Jeanne Kim MD  08/25/18 0102

## 2018-08-25 NOTE — DISCHARGE INSTRUCTIONS
Discharge Instructions  Diverticulitis  Your provider has diagnosed you with diverticulitis.  Diverticulitis is an infection of a diverticulum, which is a tiny sack-like structure that protrudes off the wall of the colon (large intestine).  These sacks are created over years of increased pressure in the colon (this is diverticulosis), usually as a result of a diet without enough fruits, vegetables, and whole grains. Because these sacks are small, bacteria can get trapped inside them and cause an infection (this is divericulitis).  This infection often causes abdominal (belly) pain, fever, nausea (sick to stomach), and vomiting (throwing up). Diverticulitis is usually treated at home.  However, sometimes diverticulitis needs treatment in the hospital and may even need surgery.    Generally, every Emergency Department visit should have a follow-up clinic visit with either a primary or a specialty clinic/provider. Please follow-up as instructed by your emergency provider today.    Return to the Emergency Department if:     You get an oral temperature above 102oF or as directed by your provider.    You have blood in your stools (bright red or black, tarry stools), or have blood in your vomit.    You keep vomiting or cannot drink liquids or cannot keep your medicine down.    You cannot have a bowel movement or you cannot pass gas.    Your stomach gets bloated or bigger.    You faint or become very weak.    Your pain is too bad to tolerate.    You have new symptoms or anything that worries you.    What can I do to help myself?    Fill any antibiotic prescriptions the provider gave you and take them right away. Be sure to finish the whole antibiotic prescription.    For the first day or two at home drink only clear liquids.  This lets your intestines rest.    If your pain has improved after one or two days, you may start eating mild foods. Soda crackers, toast, plain noodles, gelatin, applesauce and bananas are good first  "choices.  Avoid foods that have acid, are spicy, fatty or fibrous (such as meats, coarse grains, vegetables). You may start eating these foods again in about 3-4 days when you are better.    Once you are back to normal, eat a high fiber diet of fruits, vegetables and whole grains. Some people think you should avoid eating nuts, seeds, and corn, but there is no definite proof this makes any difference in whether you will get diverticulitis again.     Pain and fever can be treated with Tylenol  (acetaminophen) or you might have been prescribed a pain medication.    Probiotics: If you have been given an antibiotic, you may want to also take a probiotic pill or eat yogurt with live cultures. Probiotics have \"good bacteria\" to help your intestines stay healthy. Studies have shown that probiotics help prevent diarrhea and other intestine problems (including C. diff infection) when you take antibiotics. You can buy these without a prescription in the pharmacy section of the store.  If you were given a prescription for medicine here today, be sure to read all of the information (including the package insert) that comes with your prescription.  This will include important information about the medicine, its side effects, and any warnings that you need to know about.  The pharmacist who fills the prescription can provide more information and answer questions you may have about the medicine.  If you have questions or concerns that the pharmacist cannot address, please call or return to the Emergency Department.     Remember that you can always come back to the Emergency Department if you are not able to see your regular provider in the amount of time listed above, if you get any new symptoms, or if there is anything that worries you.  "

## 2018-12-30 ENCOUNTER — HOSPITAL ENCOUNTER (EMERGENCY)
Facility: CLINIC | Age: 58
Discharge: HOME | End: 2018-12-30
Attending: EMERGENCY MEDICINE | Admitting: EMERGENCY MEDICINE
Payer: COMMERCIAL

## 2018-12-30 VITALS
HEIGHT: 70 IN | HEART RATE: 92 BPM | WEIGHT: 185 LBS | TEMPERATURE: 99.2 F | OXYGEN SATURATION: 98 % | BODY MASS INDEX: 26.48 KG/M2 | DIASTOLIC BLOOD PRESSURE: 87 MMHG | SYSTOLIC BLOOD PRESSURE: 119 MMHG

## 2018-12-30 DIAGNOSIS — K57.32 DIVERTICULITIS OF COLON: ICD-10-CM

## 2018-12-30 PROCEDURE — 99282 EMERGENCY DEPT VISIT SF MDM: CPT

## 2018-12-30 RX ORDER — CIPROFLOXACIN 500 MG/1
500 TABLET, FILM COATED ORAL 2 TIMES DAILY
Qty: 20 TABLET | Refills: 0 | Status: SHIPPED | OUTPATIENT
Start: 2018-12-30 | End: 2019-02-12

## 2018-12-30 RX ORDER — METRONIDAZOLE 500 MG/1
500 TABLET ORAL 4 TIMES DAILY
Qty: 40 TABLET | Refills: 0 | Status: SHIPPED | OUTPATIENT
Start: 2018-12-30 | End: 2019-02-12

## 2018-12-30 ASSESSMENT — ENCOUNTER SYMPTOMS
BLOOD IN STOOL: 0
FEVER: 0
CONSTIPATION: 1
CHILLS: 0
ABDOMINAL PAIN: 1
VOMITING: 0
NAUSEA: 0

## 2018-12-30 ASSESSMENT — MIFFLIN-ST. JEOR: SCORE: 1660.4

## 2018-12-30 NOTE — ED PROVIDER NOTES
"  History     Chief Complaint:  Abdominal pain    HPI   Claude Hayes is a 58 year old male with a history of newly diagnosed diverticulitis and recently treated in hospital for perforation in 3/5/2018 who presents with abdominal. The patient states that ever since his recent admission in August, he has been adhering a high fiber diet and has fairly good control of his symptoms. He has seen gastro in follow up but only once and has not had to see them since. The patient states that around tamiko time he had broke from his typical diet and over the past 3 days he has since developed progressive lower abdominal pain. He has had associated constipation but his symptoms felt like a less severe bout of his diverticulitis. The pain is not localizing to his upper abdomen and seems to be worst in the lower quadrants. He had some chills last night and a measured temperature of 100 F but has not had any fevers, nausea, vomiting, bloody stools, difficulty urinating or decreased urine output. The patient mentions this feels like his previous diverticulitis.     Allergies:  Seasonal Allergies      Medications:    Aspirin  Lisinopril   Crestor     Past Medical History:    Hyperlipidemia  Perforated diverticulitis  Hypertension   Lumbago   Hyperlipidemia     Past Surgical History:    Tonsillectomy     Family History:    History reviewed. No pertinent family history.      Social History:  Smoking Status: Never Smoker  Alcohol Use: Yes, 1 time week   Marital Status:        Review of Systems   Constitutional: Negative for chills and fever.   Gastrointestinal: Positive for abdominal pain and constipation. Negative for blood in stool, nausea and vomiting.   All other systems reviewed and are negative.    Physical Exam   First Vitals:  BP: 124/71  Pulse: 92  Heart Rate: 89  Temp: 99.2  F (37.3  C)  Height: 177.8 cm (5' 10\")  Weight: 83.9 kg (185 lb)  SpO2: 98 %      Physical Exam  Constitutional: White male supine  HENT: " No signs of trauma.   Eyes: EOM are normal. Pupils are equal, round, and reactive to light.   Neck: Normal range of motion. No JVD present. No cervical adenopathy.  Cardiovascular: Regular rhythm.  Exam reveals no gallop and no friction rub.    No murmur heard.  Pulmonary/Chest: Bilateral breath sounds normal. No wheezes, rhonchi or rales.  Abdominal: Soft. Left lower quadrant tenderness with deep palpation. No guarding or rebound. 2+ femoral pulses  Musculoskeletal: No edema. No tenderness.   Lymphadenopathy: No lymphadenopathy.   Neurological: Alert and oriented to person, place, and time. Normal strength. Coordination normal.   Skin: Skin is warm and dry. No rash noted. No erythema.     Emergency Department Course     Past medical records, nursing notes, and vitals reviewed.  1701: I performed an exam of the patient as documented above. Clinical findings and plan explained to the Patient. Patient discharged home with instructions regarding supportive care, medications, and reasons to return as well as the importance of close follow-up were reviewed.      Impression & Plan      Medical Decision Making:  Claude Hayes is a 58 year old male who comes in with 3 days of left lower quadrant pain similar to his previous diverticulitis. He was seen twice this year for diverticulitis, the first time was in March when he had a small perforation, and a second time in August. He states he had a CT and blood work done, and the diagnosis was made. He states his pain now is not nearly as bad as it was when he had the perforation but similar to his August pain, and he wondered if he could simply get antibiotics and not require CT scanning/lab work. On his examination, he is afebrile and he has left lower quadrant tenderness but no guarding or rebound. There are no masses. He has a nonsurgical abdomen. I think this is reasonable with this being his third bout. I will start him on Cipro and Flagyl which has worked for him. He  should be on a clear liquid diet while having pain and advance as tolerated. I have told him to follow up with his PMD in 2 days and recheck in the ED for increased pain, fever, persistent vomiting, or bleeding. The patient understands the risks of possibly having a small perforation no initially diagnosed. I think the abscess and bowel obstruction is unlikely.         Diagnosis:    ICD-10-CM    1. Diverticulitis of colon K57.32        Discharge Medications:     ciprofloxacin 500 MG tablet  Commonly known as:  CIPRO  500 mg, Oral, 2 TIMES DAILY        Modified    metroNIDAZOLE 500 MG tablet  Commonly known as:  FLAGYL  500 mg, Oral, 4 TIMES DAILY  What changed:  when to take this            Tomás Quiñonez  12/30/2018    EMERGENCY DEPARTMENT  I, Tomás Quiñonez, am serving as a scribe at 5:01 PM on 12/30/2018 to document services personally performed by Mohan Cancino MD based on my observations and the provider's statements to me.       Mohan Cancino MD  12/30/18 8272

## 2018-12-30 NOTE — ED AVS SNAPSHOT
Emergency Department  [address] [place] [id number]  Phone:  [phone]  Fax:  [phone]                                    Claude Hayes   MRN: [id number]    Department:   Emergency Department   Date of Visit:  12/30/2018           After Visit Summary Signature Page    I have received my discharge instructions, and my questions have been answered. I have discussed any challenges I see with this plan with the nurse or doctor.    ..........................................................................................................................................  Patient/Patient Representative Signature      ..........................................................................................................................................  Patient Representative Print Name and Relationship to Patient    ..................................................               ................................................  Date                                   Time    ..........................................................................................................................................  Reviewed by Signature/Title    ...................................................              ..............................................  Date                                               Time          22EPIC Rev 08/18

## 2019-01-02 ENCOUNTER — MYC MEDICAL ADVICE (OUTPATIENT)
Dept: FAMILY MEDICINE | Facility: CLINIC | Age: 59
End: 2019-01-02

## 2019-01-02 DIAGNOSIS — K57.32 DIVERTICULITIS OF COLON: Primary | ICD-10-CM

## 2019-01-02 NOTE — TELEPHONE ENCOUNTER
Dr Hernandez,   [facility] and advise.  Thanks,  Peggy BETH RN

## 2019-01-02 NOTE — TELEPHONE ENCOUNTER
Please advise pt that GI referral made today.    Preferred Location: [facility] [phone] [phone]

## 2019-01-03 ENCOUNTER — TRANSFERRED RECORDS (OUTPATIENT)
Dept: HEALTH INFORMATION MANAGEMENT | Facility: CLINIC | Age: 59
End: 2019-01-03

## 2019-02-12 ENCOUNTER — ANCILLARY PROCEDURE (OUTPATIENT)
Dept: GENERAL RADIOLOGY | Facility: CLINIC | Age: 59
End: 2019-02-12
Attending: INTERNAL MEDICINE
Payer: COMMERCIAL

## 2019-02-12 ENCOUNTER — OFFICE VISIT (OUTPATIENT)
Dept: FAMILY MEDICINE | Facility: CLINIC | Age: 59
End: 2019-02-12
Payer: COMMERCIAL

## 2019-02-12 VITALS
HEART RATE: 67 BPM | SYSTOLIC BLOOD PRESSURE: 123 MMHG | DIASTOLIC BLOOD PRESSURE: 79 MMHG | BODY MASS INDEX: 28.41 KG/M2 | TEMPERATURE: 96.6 F | OXYGEN SATURATION: 96 % | WEIGHT: 198 LBS

## 2019-02-12 DIAGNOSIS — R05.9 COUGH: ICD-10-CM

## 2019-02-12 DIAGNOSIS — R05.9 COUGH: Primary | ICD-10-CM

## 2019-02-12 PROCEDURE — 99213 OFFICE O/P EST LOW 20 MIN: CPT | Performed by: INTERNAL MEDICINE

## 2019-02-12 PROCEDURE — 71046 X-RAY EXAM CHEST 2 VIEWS: CPT

## 2019-02-12 NOTE — PROGRESS NOTES
The patient presents with a cold and a cough for about a month.  It has been productive but now is not productive.  He is a non-smoker with no recent travel.  He is not having chest pain or shortness of breath.  He coughs quite a bit in the morning and in the evening but not so much in between.  He has no history of lung disease.  He is not feeling ill.  No fevers or night sweats.  He has had clear nasal discharge and no sore throat or earache.    Past Medical History:   Diagnosis Date     Diverticulitis 2018    [facility] 2018     Elevated cholesterol      Hypertension      Past Surgical History:   Procedure Laterality Date     TONSILLECTOMY  1966     Social History     Socioeconomic History     Marital status:      Spouse name: Not on file     Number of children: 2     Years of education: Not on file     Highest education level: Not on file   Social Needs     Financial resource strain: Not on file     Food insecurity - worry: Not on file     Food insecurity - inability: Not on file     Transportation needs - medical: Not on file     Transportation needs - non-medical: Not on file   Occupational History     Occupation: [employer]    Tobacco Use     Smoking status: Never Smoker     Smokeless tobacco: Never Used   Substance and Sexual Activity     Alcohol use: Yes     Comment: 1 drink a week     Drug use: No     Sexual activity: Yes     Partners: Female   Other Topics Concern     Parent/sibling w/ CABG, MI or angioplasty before 65F 55M? Not Asked   Social History Narrative    , 2 children, works as a [employer] . Was working out regularly up to 3 months ago.  (last updated 3/5/2018)      Current Outpatient Medications   Medication Sig Dispense Refill     ASPIRIN PO Take 81 mg by mouth daily       lisinopril (PRINIVIL) 10 MG tablet Take 1 tablet (10 mg) by mouth daily 90 tablet 3     rosuvastatin (CRESTOR) 20 MG tablet Take 1 tablet (20 mg) by mouth daily 90 tablet 3     Allergies   Allergen  Reactions     Seasonal Allergies      FAMILY HISTORY NOTED AND REVIEWED    REVIEW OF SYSTEMS: above    PHYSICAL EXAM    /79 (BP Location: Right arm, Cuff Size: Adult Regular)   Pulse 67   Temp 96.6  F (35.9  C) (Oral)   Wt 89.8 kg (198 lb)   SpO2 96%   BMI 28.41 kg/m      Patient appears non toxic  Tympanic membranes and canals: within normal limits bilaterally.   Mouth: Posterior pharynx, mucous membranes and tongue exam within normal limits.  Neck: supple, no nuchal rigidity or masses.  No anterior or posterior cervical adenopathy.    Lungs: clear x faint crackles lll  cv reglar rate and rhythm, no jvp    cxr - clear    ASSESSMENT:  Cough, suspect viral, neg cxr, doubt chf, sinusitis      PLAN:  otc and if worsens, not gone soon to call    Wiley White M.D.      Wiley White M.D.

## 2019-02-12 NOTE — RESULT ENCOUNTER NOTE
It was nice meeting you.  Your chest xray is clear, no pneumonia.  Let us know if you are not better soon.    Wiley White M.D.

## 2019-02-22 ENCOUNTER — TRANSFERRED RECORDS (OUTPATIENT)
Dept: HEALTH INFORMATION MANAGEMENT | Facility: CLINIC | Age: 59
End: 2019-02-22

## 2019-03-07 ENCOUNTER — TRANSFERRED RECORDS (OUTPATIENT)
Dept: HEALTH INFORMATION MANAGEMENT | Facility: CLINIC | Age: 59
End: 2019-03-07

## 2020-01-10 ENCOUNTER — OFFICE VISIT (OUTPATIENT)
Dept: FAMILY MEDICINE | Facility: CLINIC | Age: 60
End: 2020-01-10
Payer: COMMERCIAL

## 2020-01-10 VITALS
BODY MASS INDEX: 29.19 KG/M2 | OXYGEN SATURATION: 95 % | HEIGHT: 69 IN | TEMPERATURE: 97.1 F | DIASTOLIC BLOOD PRESSURE: 92 MMHG | HEART RATE: 69 BPM | SYSTOLIC BLOOD PRESSURE: 140 MMHG | WEIGHT: 197.1 LBS

## 2020-01-10 DIAGNOSIS — G89.29 CHRONIC BILATERAL LOW BACK PAIN WITHOUT SCIATICA: ICD-10-CM

## 2020-01-10 DIAGNOSIS — E78.5 HYPERLIPIDEMIA LDL GOAL <100: ICD-10-CM

## 2020-01-10 DIAGNOSIS — H61.23 BILATERAL IMPACTED CERUMEN: ICD-10-CM

## 2020-01-10 DIAGNOSIS — Z12.5 SCREENING FOR PROSTATE CANCER: ICD-10-CM

## 2020-01-10 DIAGNOSIS — I10 ESSENTIAL HYPERTENSION: ICD-10-CM

## 2020-01-10 DIAGNOSIS — M54.50 CHRONIC BILATERAL LOW BACK PAIN WITHOUT SCIATICA: ICD-10-CM

## 2020-01-10 DIAGNOSIS — Z00.00 ROUTINE HISTORY AND PHYSICAL EXAMINATION OF ADULT: Primary | ICD-10-CM

## 2020-01-10 PROCEDURE — 36415 COLL VENOUS BLD VENIPUNCTURE: CPT | Performed by: INTERNAL MEDICINE

## 2020-01-10 PROCEDURE — 99396 PREV VISIT EST AGE 40-64: CPT | Performed by: INTERNAL MEDICINE

## 2020-01-10 PROCEDURE — G0103 PSA SCREENING: HCPCS | Performed by: INTERNAL MEDICINE

## 2020-01-10 RX ORDER — LISINOPRIL 10 MG/1
10 TABLET ORAL DAILY
Qty: 90 TABLET | Refills: 3 | Status: SHIPPED | OUTPATIENT
Start: 2020-01-10 | End: 2021-01-15

## 2020-01-10 RX ORDER — ROSUVASTATIN CALCIUM 20 MG/1
20 TABLET, COATED ORAL DAILY
Qty: 90 TABLET | Refills: 3 | Status: SHIPPED | OUTPATIENT
Start: 2020-01-10 | End: 2021-01-15

## 2020-01-10 ASSESSMENT — MIFFLIN-ST. JEOR: SCORE: 1699.03

## 2020-01-10 NOTE — PROGRESS NOTES
SUBJECTIVE:   CC: Claude Hayes is an 59 year old male who presents for preventative health visit.     Healthy Habits:     Getting at least 3 servings of Calcium per day:  Yes    Bi-annual eye exam:  Yes    Dental care twice a year:  Yes    Sleep apnea or symptoms of sleep apnea:  Daytime drowsiness and Excessive snoring    Diet:  Low fat/cholesterol and Carbohydrate counting    Frequency of exercise:  2-3 days/week    Duration of exercise:  30-45 minutes    Taking medications regularly:  Yes    Medication side effects:  None    PHQ-2 Total Score: 0      Today's PHQ-2 Score:   PHQ-2 ( 1999 [employer]izer) 1/3/2020   Q1: Little interest or pleasure in doing things 0   Q2: Feeling down, depressed or hopeless 0   PHQ-2 Score 0   Q1: Little interest or pleasure in doing things Not at all   Q2: Feeling down, depressed or hopeless Not at all   PHQ-2 Score 0       Abuse: Current or Past(Physical, Sexual or Emotional)- No  Do you feel safe in your environment? Yes    Have you ever done Advance Care Planning? (For example, a Health Directive, POLST, or a discussion with a medical provider or your loved ones about your wishes): Yes, advance care planning is on file.    Social History     Tobacco Use     Smoking status: Never Smoker     Smokeless tobacco: Never Used   Substance Use Topics     Alcohol use: Yes     Comment: 1 drink a week     If you drink alcohol do you typically have >3 drinks per day or >7 drinks per week? No    Alcohol Use 1/3/2020   Prescreen: >3 drinks/day or >7 drinks/week? No       Last PSA: No results found for: PSA    Reviewed orders with patient. Reviewed health maintenance and updated orders accordingly - Yes  Labs reviewed in EPIC    Reviewed and updated as needed this visit by clinical staff  Tobacco  Allergies  Meds         Reviewed and updated as needed this visit by Provider        Past Medical History:   Diagnosis Date     Diverticulitis 2018    hosp fsd 2018     Elevated cholesterol       "Hypertension         Review of Systems  CONSTITUTIONAL: NEGATIVE for fever, chills, change in weight  INTEGUMENTARY/SKIN: NEGATIVE for worrisome rashes, moles or lesions  EYES: NEGATIVE for vision changes or irritation  ENT: NEGATIVE for ear, mouth and throat problems  RESP: NEGATIVE for significant cough or SOB  CV: NEGATIVE for chest pain, palpitations or peripheral edema  GI: NEGATIVE for nausea, abdominal pain, heartburn, or change in bowel habits   male: negative for dysuria, hematuria, decreased urinary stream, erectile dysfunction, urethral discharge  MUSCULOSKELETAL: POSITIVE for chronic low back pains  NEURO: NEGATIVE for weakness, dizziness or paresthesias  PSYCHIATRIC: NEGATIVE for changes in mood or affect    OBJECTIVE:   BP (!) 140/92 (BP Location: Right arm, Patient Position: Sitting, Cuff Size: Adult Regular)   Pulse 69   Temp 97.1  F (36.2  C) (Oral)   Ht 1.76 m (5' 9.29\")   Wt 89.4 kg (197 lb 1.6 oz)   SpO2 95%   BMI 28.86 kg/m      Physical Exam  GENERAL: alert and no distress  EYES: Eyes grossly normal to inspection, PERRL and conjunctivae and sclerae normal  HENT: ear canals and TM's normal, nose and mouth without ulcers or lesions  NECK: no adenopathy, no asymmetry, masses, or scars and thyroid normal to palpation  RESP: lungs clear to auscultation - no rales, rhonchi or wheezes  CV: regular rate and rhythm, normal S1 S2, no S3 or S4, no murmur, click or rub, no peripheral edema and peripheral pulses strong  ABDOMEN: soft, nontender, no hepatosplenomegaly, no masses and bowel sounds normal  MS: diffuse low back pains noted  SKIN: no suspicious lesions or rashes  NEURO: Normal strength and tone, mentation intact and speech normal  PSYCH: mentation appears normal, affect normal/bright    Diagnostic Test Results:  Labs reviewed in Epic  No results found for any visits on 01/10/20.    ASSESSMENT/PLAN:   (Z00.00) Routine history and physical examination of adult  (primary encounter " "diagnosis)  (I10) Essential hypertension  Comment: patient has ran out of Lisinopril BP medication  Plan: lisinopril (PRINIVIL) 10 MG tablet      (E78.5) Hyperlipidemia LDL goal <100  Comment: patient is due for Crestor medication refill  Plan: rosuvastatin (CRESTOR) 20 MG tablet      (Z12.5) Screening for prostate cancer  Comment: patient is due for PSA lab  Plan: PSA, screen      (M54.5,  G89.29) Chronic bilateral low back pain without sciatica  Comment: chronic low back pains  Plan: SPORTS MEDICINE REFERRAL      COUNSELING:   Reviewed preventive health counseling, as reflected in patient instructions  Special attention given to:        Regular exercise       Healthy diet/nutrition    Estimated body mass index is 28.86 kg/m  as calculated from the following:    Height as of this encounter: 1.76 m (5' 9.29\").    Weight as of this encounter: 89.4 kg (197 lb 1.6 oz).     Weight management plan: Discussed healthy diet and exercise guidelines     reports that he has never smoked. He has never used smokeless tobacco.      Counseling Resources:  ATP IV Guidelines  Pooled Cohorts Equation Calculator  FRAX Risk Assessment  ICSI Preventive Guidelines  Dietary Guidelines for Americans, 2010  USDA's MyPlate  ASA Prophylaxis  Lung CA Screening    Fabiana Hernandez MD  [facility]  "

## 2020-01-11 LAB — PSA SERPL-ACNC: 1.72 UG/L (ref 0–4)

## 2021-01-03 ENCOUNTER — HEALTH MAINTENANCE LETTER (OUTPATIENT)
Age: 61
End: 2021-01-03

## 2021-01-13 DIAGNOSIS — E78.5 HYPERLIPIDEMIA LDL GOAL <100: ICD-10-CM

## 2021-01-13 DIAGNOSIS — I10 ESSENTIAL HYPERTENSION: ICD-10-CM

## 2021-01-15 RX ORDER — LISINOPRIL 10 MG/1
10 TABLET ORAL DAILY
Qty: 30 TABLET | Refills: 0 | Status: SHIPPED | OUTPATIENT
Start: 2021-01-15 | End: 2021-02-19

## 2021-01-15 RX ORDER — ROSUVASTATIN CALCIUM 20 MG/1
20 TABLET, COATED ORAL DAILY
Qty: 90 TABLET | Refills: 0 | Status: SHIPPED | OUTPATIENT
Start: 2021-01-15 | End: 2021-04-30

## 2021-01-15 NOTE — TELEPHONE ENCOUNTER
Message to patient to schedule office visit prior to next refill. (last OV 1 year ago.) Prescription approved per [facility] Refill Protocol X 30 Lisinopril.  Kitty Muse RN on 1/15/2021 at 8:45 AM

## 2021-02-17 DIAGNOSIS — I10 ESSENTIAL HYPERTENSION: ICD-10-CM

## 2021-02-18 NOTE — TELEPHONE ENCOUNTER
Lisinopril 10 mg tablets    Summary: Take 1 tablet (10 mg) by mouth daily Please schedule office visit prior to next refill: [phone], Disp-30 tablet, R-0, E-Prescribe   Dose, Route, Frequency: 10 mg, Oral, DAILY  Start: 1/15/2021  Ord/Sold: 1/15/2021

## 2021-02-19 RX ORDER — LISINOPRIL 10 MG/1
10 TABLET ORAL DAILY
Qty: 30 TABLET | Refills: 0 | Status: SHIPPED | OUTPATIENT
Start: 2021-02-19 | End: 2021-04-30

## 2021-04-25 ENCOUNTER — HEALTH MAINTENANCE LETTER (OUTPATIENT)
Age: 61
End: 2021-04-25

## 2021-04-30 ENCOUNTER — OFFICE VISIT (OUTPATIENT)
Dept: FAMILY MEDICINE | Facility: CLINIC | Age: 61
End: 2021-04-30
Payer: COMMERCIAL

## 2021-04-30 VITALS
TEMPERATURE: 97.7 F | WEIGHT: 209 LBS | BODY MASS INDEX: 30.96 KG/M2 | DIASTOLIC BLOOD PRESSURE: 92 MMHG | OXYGEN SATURATION: 96 % | HEART RATE: 66 BPM | SYSTOLIC BLOOD PRESSURE: 146 MMHG | HEIGHT: 69 IN

## 2021-04-30 DIAGNOSIS — G89.29 CHRONIC MIDLINE LOW BACK PAIN WITHOUT SCIATICA: ICD-10-CM

## 2021-04-30 DIAGNOSIS — Z11.59 NEED FOR HEPATITIS C SCREENING TEST: ICD-10-CM

## 2021-04-30 DIAGNOSIS — R73.09 ELEVATED HEMOGLOBIN A1C: ICD-10-CM

## 2021-04-30 DIAGNOSIS — I10 ESSENTIAL HYPERTENSION: ICD-10-CM

## 2021-04-30 DIAGNOSIS — Z00.00 ROUTINE HISTORY AND PHYSICAL EXAMINATION OF ADULT: Primary | ICD-10-CM

## 2021-04-30 DIAGNOSIS — M54.50 CHRONIC MIDLINE LOW BACK PAIN WITHOUT SCIATICA: ICD-10-CM

## 2021-04-30 DIAGNOSIS — E78.5 HYPERLIPIDEMIA LDL GOAL <100: ICD-10-CM

## 2021-04-30 LAB
ANION GAP SERPL CALCULATED.3IONS-SCNC: 3 MMOL/L (ref 3–14)
BASOPHILS # BLD AUTO: 0 10E9/L (ref 0–0.2)
BASOPHILS NFR BLD AUTO: 0.6 %
BUN SERPL-MCNC: 18 MG/DL (ref 7–30)
CALCIUM SERPL-MCNC: 8.4 MG/DL (ref 8.5–10.1)
CHLORIDE SERPL-SCNC: 108 MMOL/L (ref 94–109)
CHOLEST SERPL-MCNC: 242 MG/DL
CO2 SERPL-SCNC: 27 MMOL/L (ref 20–32)
CREAT SERPL-MCNC: 1 MG/DL (ref 0.66–1.25)
DIFFERENTIAL METHOD BLD: NORMAL
EOSINOPHIL # BLD AUTO: 0.1 10E9/L (ref 0–0.7)
EOSINOPHIL NFR BLD AUTO: 2 %
ERYTHROCYTE [DISTWIDTH] IN BLOOD BY AUTOMATED COUNT: 13.4 % (ref 10–15)
GFR SERPL CREATININE-BSD FRML MDRD: 81 ML/MIN/{1.73_M2}
GLUCOSE SERPL-MCNC: 94 MG/DL (ref 70–99)
HBA1C MFR BLD: 6 % (ref 0–5.6)
HCT VFR BLD AUTO: 49.5 % (ref 40–53)
HDLC SERPL-MCNC: 48 MG/DL
HGB BLD-MCNC: 17 G/DL (ref 13.3–17.7)
LDLC SERPL CALC-MCNC: 172 MG/DL
LYMPHOCYTES # BLD AUTO: 2.1 10E9/L (ref 0.8–5.3)
LYMPHOCYTES NFR BLD AUTO: 32.2 %
MCH RBC QN AUTO: 29.9 PG (ref 26.5–33)
MCHC RBC AUTO-ENTMCNC: 34.3 G/DL (ref 31.5–36.5)
MCV RBC AUTO: 87 FL (ref 78–100)
MONOCYTES # BLD AUTO: 0.7 10E9/L (ref 0–1.3)
MONOCYTES NFR BLD AUTO: 10.9 %
NEUTROPHILS # BLD AUTO: 3.6 10E9/L (ref 1.6–8.3)
NEUTROPHILS NFR BLD AUTO: 54.3 %
NONHDLC SERPL-MCNC: 194 MG/DL
PLATELET # BLD AUTO: 325 10E9/L (ref 150–450)
POTASSIUM SERPL-SCNC: 4.4 MMOL/L (ref 3.4–5.3)
RBC # BLD AUTO: 5.68 10E12/L (ref 4.4–5.9)
SODIUM SERPL-SCNC: 138 MMOL/L (ref 133–144)
TRIGL SERPL-MCNC: 110 MG/DL
WBC # BLD AUTO: 6.6 10E9/L (ref 4–11)

## 2021-04-30 PROCEDURE — 83036 HEMOGLOBIN GLYCOSYLATED A1C: CPT | Performed by: INTERNAL MEDICINE

## 2021-04-30 PROCEDURE — 80048 BASIC METABOLIC PNL TOTAL CA: CPT | Performed by: INTERNAL MEDICINE

## 2021-04-30 PROCEDURE — 80061 LIPID PANEL: CPT | Performed by: INTERNAL MEDICINE

## 2021-04-30 PROCEDURE — G0103 PSA SCREENING: HCPCS | Performed by: INTERNAL MEDICINE

## 2021-04-30 PROCEDURE — 85025 COMPLETE CBC W/AUTO DIFF WBC: CPT | Performed by: INTERNAL MEDICINE

## 2021-04-30 PROCEDURE — 36415 COLL VENOUS BLD VENIPUNCTURE: CPT | Performed by: INTERNAL MEDICINE

## 2021-04-30 PROCEDURE — 99396 PREV VISIT EST AGE 40-64: CPT | Performed by: INTERNAL MEDICINE

## 2021-04-30 RX ORDER — LISINOPRIL 10 MG/1
10 TABLET ORAL DAILY
Qty: 90 TABLET | Refills: 3 | Status: SHIPPED | OUTPATIENT
Start: 2021-04-30 | End: 2022-05-26

## 2021-04-30 RX ORDER — ROSUVASTATIN CALCIUM 20 MG/1
20 TABLET, COATED ORAL DAILY
Qty: 90 TABLET | Refills: 3 | Status: SHIPPED | OUTPATIENT
Start: 2021-04-30 | End: 2022-05-26

## 2021-04-30 ASSESSMENT — MIFFLIN-ST. JEOR: SCORE: 1748

## 2021-04-30 NOTE — PROGRESS NOTES
SUBJECTIVE:   CC: Claude Hayes is an 60 year old male who presents for preventative health visit.     Patient has been advised of split billing requirements and indicates understanding: Yes  Healthy Habits:     Getting at least 3 servings of Calcium per day:  Yes    Bi-annual eye exam:  Yes    Dental care twice a year:  Yes    Sleep apnea or symptoms of sleep apnea:  Daytime drowsiness    Diet:  Other    Frequency of exercise:  4-5 days/week    Duration of exercise:  30-45 minutes    Taking medications regularly:  Yes    Barriers to taking medications:  None    Medication side effects:  None    PHQ-2 Total Score: 0    Additional concerns today:  No    Ability to successfully perform activities of daily living: Yes, no assistance needed  Home safety:  none identified   Hearing impairment: no      Today's PHQ-2 Score:   PHQ-2 ( 1999 Pfizer) 4/26/2021   Q1: Little interest or pleasure in doing things 0   Q2: Feeling down, depressed or hopeless 0   PHQ-2 Score 0   Q1: Little interest or pleasure in doing things Not at all   Q2: Feeling down, depressed or hopeless Not at all   PHQ-2 Score 0       Abuse: Current or Past(Physical, Sexual or Emotional)- No  Do you feel safe in your environment? Yes        Social History     Tobacco Use     Smoking status: Never Smoker     Smokeless tobacco: Never Used   Substance Use Topics     Alcohol use: Yes     Comment: 1 drink a week     If you drink alcohol do you typically have >3 drinks per day or >7 drinks per week? No    Alcohol Use 4/26/2021   Prescreen: >3 drinks/day or >7 drinks/week? No   Prescreen: >3 drinks/day or >7 drinks/week? -   No flowsheet data found.    Last PSA:   PSA   Date Value Ref Range Status   01/10/2020 1.72 0 - 4 ug/L Final     Comment:     Assay Method:  Chemiluminescence using Siemens Vista analyzer       Reviewed orders with patient. Reviewed health maintenance and updated orders accordingly - Yes  Labs reviewed in EPIC    Reviewed and updated as  "needed this visit by clinical staff                 Reviewed and updated as needed this visit by Provider                Past Medical History:   Diagnosis Date     Diverticulitis 2018    hosp fsd 2018     Elevated cholesterol      Heart disease      Hypertension         Review of Systems  CONSTITUTIONAL: NEGATIVE for fever, chills, change in weight  INTEGUMENTARY/SKIN: NEGATIVE for worrisome rashes, moles or lesions  EYES: NEGATIVE for vision changes or irritation  ENT: NEGATIVE for ear, mouth and throat problems  RESP: NEGATIVE for significant cough or SOB  CV: NEGATIVE for chest pain, palpitations or peripheral edema  GI: NEGATIVE for nausea, abdominal pain, heartburn, or change in bowel habits   male: negative for dysuria, hematuria, decreased urinary stream, erectile dysfunction, urethral discharge  MUSCULOSKELETAL: POSITIVE for midline lumbar low back pains  NEURO: NEGATIVE for weakness, dizziness or paresthesias  PSYCHIATRIC: NEGATIVE for changes in mood or affect    OBJECTIVE:   BP (!) 146/92 (BP Location: Right arm, Patient Position: Sitting, Cuff Size: Adult Large)   Pulse 66   Temp 97.7  F (36.5  C) (Temporal)   Ht 1.76 m (5' 9.29\")   Wt 94.8 kg (209 lb)   SpO2 96%   BMI 30.61 kg/m      Physical Exam  GENERAL: alert and no distress  EYES: Eyes grossly normal to inspection, PERRL and conjunctivae and sclerae normal  HENT: ear canals and TM's normal, nose and mouth without ulcers or lesions  NECK: no adenopathy, no asymmetry, masses, or scars and thyroid normal to palpation  RESP: lungs clear to auscultation - no rales, rhonchi or wheezes  CV: regular rate and rhythm, normal S1 S2, no S3 or S4, no murmur, click or rub, no peripheral edema and peripheral pulses strong  ABDOMEN: soft, nontender, no hepatosplenomegaly, no masses and bowel sounds normal  MS: midline lumbar low back pains noted, no edema  SKIN: no suspicious lesions or rashes  NEURO: Normal strength and tone, mentation intact and speech " "normal  PSYCH: mentation appears normal, affect normal/bright    Diagnostic Test Results:  Labs reviewed in Epic    ASSESSMENT/PLAN:   (Z00.00) Routine history and physical examination of adult  (primary encounter diagnosis)  (M54.5,  G89.29) Chronic midline low back pain without sciatica  Comment: not well controlled  Plan: Orthopedic & Spine  Referral      Patient has been advised of split billing requirements and indicates understanding: Yes  COUNSELING:   Reviewed preventive health counseling, as reflected in patient instructions  Special attention given to:        Regular exercise       Healthy diet/nutrition    Estimated body mass index is 28.86 kg/m  as calculated from the following:    Height as of 1/10/20: 1.76 m (5' 9.29\").    Weight as of 1/10/20: 89.4 kg (197 lb 1.6 oz).     Weight management plan: Discussed healthy diet and exercise guidelines    He reports that he has never smoked. He has never used smokeless tobacco.      Counseling Resources:  ATP IV Guidelines  Pooled Cohorts Equation Calculator  FRAX Risk Assessment  ICSI Preventive Guidelines  Dietary Guidelines for Americans, 2010  USDA's MyPlate  ASA Prophylaxis  Lung CA Screening    Fabiana Hernandez MD  [facility]  "

## 2021-04-30 NOTE — LETTER
May 3, 2021      Claude Hayes  [address]  [place] [address]        Dear ,    We are writing to inform you of your test results.    Your labs are stable compared to prior baseline. They show mildly elevated A1c and hyperlipidemia. I advise diet and exercise for treatment going forward.     Resulted Orders   **Prostate spec antigen screen FUTURE anytime   Result Value Ref Range    PSA 1.72 0 - 4 ug/L      Comment:      Assay Method:  Chemiluminescence using Siemens Vista analyzer   Lipid panel reflex to direct LDL Fasting   Result Value Ref Range    Cholesterol 242 (H) <200 mg/dL      Comment:      Desirable:       <200 mg/dl    Triglycerides 110 <150 mg/dL    HDL Cholesterol 48 >39 mg/dL    LDL Cholesterol Calculated 172 (H) <100 mg/dL      Comment:      Above desirable:  100-129 mg/dl  Borderline High:  130-159 mg/dL  High:             160-189 mg/dL  Very high:       >189 mg/dl      Non HDL Cholesterol 194 (H) <130 mg/dL      Comment:      Above Desirable:  130-159 mg/dl  Borderline high:  160-189 mg/dl  High:             190-219 mg/dl  Very high:       >219 mg/dl     **A1C FUTURE anytime   Result Value Ref Range    Hemoglobin A1C 6.0 (H) 0 - 5.6 %      Comment:      Normal <5.7% Prediabetes 5.7-6.4%  Diabetes 6.5% or higher - adopted from ADA   consensus guidelines.     Basic metabolic panel  (Ca, Cl, CO2, Creat, Gluc, K, Na, BUN)   Result Value Ref Range    Sodium 138 133 - 144 mmol/L    Potassium 4.4 3.4 - 5.3 mmol/L    Chloride 108 94 - 109 mmol/L    Carbon Dioxide 27 20 - 32 mmol/L    Anion Gap 3 3 - 14 mmol/L    Glucose 94 70 - 99 mg/dL    Urea Nitrogen 18 7 - 30 mg/dL    Creatinine 1.00 0.66 - 1.25 mg/dL    GFR Estimate 81 >60 mL/min/[1.73_m2]      Comment:      Non  GFR Calc  Starting 12/18/2018, serum creatinine based estimated GFR (eGFR) will be   calculated using the Chronic Kidney Disease Epidemiology Collaboration   (CKD-EPI) equation.      GFR Estimate  If Black >90 >60 mL/min/[1.73_m2]      Comment:       GFR Calc  Starting 12/18/2018, serum creatinine based estimated GFR (eGFR) will be   calculated using the Chronic Kidney Disease Epidemiology Collaboration   (CKD-EPI) equation.      Calcium 8.4 (L) 8.5 - 10.1 mg/dL   CBC with platelets and differential   Result Value Ref Range    WBC 6.6 4.0 - 11.0 10e9/L    RBC Count 5.68 4.4 - 5.9 10e12/L    Hemoglobin 17.0 13.3 - 17.7 g/dL    Hematocrit 49.5 40.0 - 53.0 %    MCV 87 78 - 100 fl    MCH 29.9 26.5 - 33.0 pg    MCHC 34.3 31.5 - 36.5 g/dL    RDW 13.4 10.0 - 15.0 %    Platelet Count 325 150 - 450 10e9/L    % Neutrophils 54.3 %    % Lymphocytes 32.2 %    % Monocytes 10.9 %    % Eosinophils 2.0 %    % Basophils 0.6 %    Absolute Neutrophil 3.6 1.6 - 8.3 10e9/L    Absolute Lymphocytes 2.1 0.8 - 5.3 10e9/L    Absolute Monocytes 0.7 0.0 - 1.3 10e9/L    Absolute Eosinophils 0.1 0.0 - 0.7 10e9/L    Absolute Basophils 0.0 0.0 - 0.2 10e9/L    Diff Method Automated Method        If you have any questions or concerns, please call the clinic at the number listed above.       Sincerely,      Fabiana Hernandez MD        [facility]

## 2021-05-01 LAB — PSA SERPL-ACNC: 1.72 UG/L (ref 0–4)

## 2021-05-03 ENCOUNTER — TELEPHONE (OUTPATIENT)
Dept: FAMILY MEDICINE | Facility: CLINIC | Age: 61
End: 2021-05-03

## 2021-05-03 NOTE — TELEPHONE ENCOUNTER
MTM referral from: [facility] visit (referral by provider)    MTM referral outreach attempt #1 on May 3, 2021 at 1:30 PM      Outcome: Patient declined. Patient would like to move forward with medications. PCP please contact pt to discuss this., will route to MTM Pharmacist/Provider as an FYI. Thank you for the referral.     Enmanuel Jacobson, MTM coordinator

## 2021-07-14 ENCOUNTER — TELEPHONE (OUTPATIENT)
Dept: PODIATRY | Facility: CLINIC | Age: 61
End: 2021-07-14

## 2021-07-14 NOTE — TELEPHONE ENCOUNTER
Patient was scheduled on Dr. Ventura's schedule inappropriately at the wound clinic. Dr. Ventura requested he be re-scheduled at her [place] office. The wound clinic has made 2 attempts to reach patient to reschedule. Patient has been removed from our schedule. Please continue to reach out to patient and reschedule at [place]

## 2021-07-14 NOTE — TELEPHONE ENCOUNTER
Appointment notes state plantar fasciitis, pain in feet. Previous appointment was scheduled for 7/16/21 and has since been cancelled. Patient lives in [place].     TONY Mccollum RN

## 2021-07-27 ENCOUNTER — OFFICE VISIT (OUTPATIENT)
Dept: NEUROSURGERY | Facility: CLINIC | Age: 61
End: 2021-07-27
Attending: INTERNAL MEDICINE
Payer: COMMERCIAL

## 2021-07-27 ENCOUNTER — ANCILLARY PROCEDURE (OUTPATIENT)
Dept: GENERAL RADIOLOGY | Facility: CLINIC | Age: 61
End: 2021-07-27
Attending: PHYSICIAN ASSISTANT
Payer: COMMERCIAL

## 2021-07-27 VITALS — OXYGEN SATURATION: 96 % | DIASTOLIC BLOOD PRESSURE: 87 MMHG | SYSTOLIC BLOOD PRESSURE: 144 MMHG | HEART RATE: 71 BPM

## 2021-07-27 DIAGNOSIS — M54.50 CHRONIC BILATERAL LOW BACK PAIN WITHOUT SCIATICA: ICD-10-CM

## 2021-07-27 DIAGNOSIS — G89.29 CHRONIC BILATERAL LOW BACK PAIN WITHOUT SCIATICA: ICD-10-CM

## 2021-07-27 PROCEDURE — 72100 X-RAY EXAM L-S SPINE 2/3 VWS: CPT | Performed by: RADIOLOGY

## 2021-07-27 PROCEDURE — 99203 OFFICE O/P NEW LOW 30 MIN: CPT | Performed by: PHYSICIAN ASSISTANT

## 2021-07-27 PROCEDURE — G0463 HOSPITAL OUTPT CLINIC VISIT: HCPCS

## 2021-07-27 NOTE — LETTER
7/27/2021         RE: Claude Hayes  [address]  [place] [address]        Dear Colleague,    Thank you for referring your patient, Claude Hayes, to the [facility] [place]. Please see a copy of my visit note below.    Neurosurgery Consult    HPI    Mr. Hayes is a 60-year-old male presents to clinic today for evaluation of low back pain.  States been having symptoms for several years but most bothersome with walking and standing for extended periods of time and also possibly more lately with golf.  He has not had any recent imaging.  He has been doing a exercise program based on the PathAR machine and this has not bothered his back pain at all.  He points to the left flank region as to where most of his pain is located.  He denies any radicular symptoms.    Medical history  Hyperlipidemia  Hypertension  Diverticulitis      Social history  He is a [employer]       B/P: 144/87, T: Data Unavailable, P: 71, R: Data Unavailable       Exam    Alert and oriented no acute distress  Bilateral lower extremities with 5/5 strength  Reflexes 2+ patella/ankle  Negative straight leg raise bilaterally  Negative ankle clonus negative Babinski bilaterally  Lumbar spine nontender to palpation  Able to stand on heels and toes  Gait is normal    Imaging    No imaging to review    Assessment    Left sided low back pain    Plan:      We will obtain a lumbar x-ray and follow-up in with the results when they are available.  If the x-ray is unremarkable we may proceed with a lumbar MRI.    Total time of 30 minutes spent with the patient today in counseling and coordination of care.      Again, thank you for allowing me to participate in the care of your patient.        Sincerely,        Sriram Marquez PA-C     no

## 2021-07-27 NOTE — PROGRESS NOTES
Neurosurgery Consult    HPI    Mr. Hayes is a 60-year-old male presents to clinic today for evaluation of low back pain.  States been having symptoms for several years but most bothersome with walking and standing for extended periods of time and also possibly more lately with golf.  He has not had any recent imaging.  He has been doing a exercise program based on the Fig[employer] machine and this has not bothered his back pain at all.  He points to the left flank region as to where most of his pain is located.  He denies any radicular symptoms.    Medical history  Hyperlipidemia  Hypertension  Diverticulitis      Social history  He is a [employer]       B/P: 144/87, T: Data Unavailable, P: 71, R: Data Unavailable       Exam    Alert and oriented no acute distress  Bilateral lower extremities with 5/5 strength  Reflexes 2+ patella/ankle  Negative straight leg raise bilaterally  Negative ankle clonus negative Babinski bilaterally  Lumbar spine nontender to palpation  Able to stand on heels and toes  Gait is normal    Imaging    No imaging to review    Assessment    Left sided low back pain    Plan:      We will obtain a lumbar x-ray and follow-up in with the results when they are available.  If the x-ray is unremarkable we may proceed with a lumbar MRI.    Total time of 30 minutes spent with the patient today in counseling and coordination of care.

## 2021-07-28 ENCOUNTER — TELEPHONE (OUTPATIENT)
Dept: NEUROSURGERY | Facility: CLINIC | Age: 61
End: 2021-07-28

## 2021-07-28 DIAGNOSIS — G89.29 CHRONIC BILATERAL LOW BACK PAIN WITHOUT SCIATICA: Primary | ICD-10-CM

## 2021-07-28 DIAGNOSIS — M54.50 CHRONIC BILATERAL LOW BACK PAIN WITHOUT SCIATICA: Primary | ICD-10-CM

## 2021-07-29 ENCOUNTER — HOSPITAL ENCOUNTER (OUTPATIENT)
Dept: MRI IMAGING | Facility: CLINIC | Age: 61
Discharge: HOME | End: 2021-07-29
Attending: PHYSICIAN ASSISTANT | Admitting: PHYSICIAN ASSISTANT
Payer: COMMERCIAL

## 2021-07-29 DIAGNOSIS — M54.50 CHRONIC BILATERAL LOW BACK PAIN WITHOUT SCIATICA: ICD-10-CM

## 2021-07-29 DIAGNOSIS — G89.29 CHRONIC BILATERAL LOW BACK PAIN WITHOUT SCIATICA: ICD-10-CM

## 2021-07-29 PROCEDURE — 72148 MRI LUMBAR SPINE W/O DYE: CPT

## 2021-07-30 ENCOUNTER — ANCILLARY PROCEDURE (OUTPATIENT)
Dept: GENERAL RADIOLOGY | Facility: CLINIC | Age: 61
End: 2021-07-30
Attending: PODIATRIST
Payer: COMMERCIAL

## 2021-07-30 ENCOUNTER — TELEPHONE (OUTPATIENT)
Dept: NEUROSURGERY | Facility: CLINIC | Age: 61
End: 2021-07-30

## 2021-07-30 ENCOUNTER — OFFICE VISIT (OUTPATIENT)
Dept: PODIATRY | Facility: CLINIC | Age: 61
End: 2021-07-30
Payer: COMMERCIAL

## 2021-07-30 VITALS
HEIGHT: 69 IN | SYSTOLIC BLOOD PRESSURE: 134 MMHG | WEIGHT: 202 LBS | DIASTOLIC BLOOD PRESSURE: 80 MMHG | BODY MASS INDEX: 29.92 KG/M2

## 2021-07-30 DIAGNOSIS — M79.671 ARCH PAIN, RIGHT: ICD-10-CM

## 2021-07-30 DIAGNOSIS — M54.50 CHRONIC BILATERAL LOW BACK PAIN WITHOUT SCIATICA: Primary | ICD-10-CM

## 2021-07-30 DIAGNOSIS — M79.672 ARCH PAIN, LEFT: Primary | ICD-10-CM

## 2021-07-30 DIAGNOSIS — G89.29 CHRONIC BILATERAL LOW BACK PAIN WITHOUT SCIATICA: Primary | ICD-10-CM

## 2021-07-30 DIAGNOSIS — M79.672 ARCH PAIN, LEFT: ICD-10-CM

## 2021-07-30 PROCEDURE — 73630 X-RAY EXAM OF FOOT: CPT | Mod: RT | Performed by: RADIOLOGY

## 2021-07-30 PROCEDURE — 99243 OFF/OP CNSLTJ NEW/EST LOW 30: CPT | Performed by: PODIATRIST

## 2021-07-30 ASSESSMENT — MIFFLIN-ST. JEOR: SCORE: 1716.25

## 2021-07-30 NOTE — PATIENT INSTRUCTIONS
Thank you for choosing [facility] Podiatry / Foot & Ankle Surgery!    DR. ONTIVEROS'S CLINIC LOCATIONS  [facility]   [address]   [place] [address]                         [place] [address]   [phone]       SET UP SURGERY: [phone]    APPOINTMENTS: [phone]    BILLING QUESTIONS: [phone]    FAX NUMBER: [phone]      [facility] SCHEDULING  They should be calling you within 24 hours to schedule your scan.  If not, please call the location discussed at your appointment.    1) [facility]:       [phone]      [address] [place] [address]    2) [facility]:      [phone]      [address] [place] [address]    3) [facility]:       [phone]      [facility] [place] [id number]    * We will call and/or mychart you with the results. Please allow 24-48 hours for the results to be read and final.      PLANTAR FASCIITIS    Plantar fasciitis is often referred to as heel spurs or heel pain. Plantar fasciitis is a very common problem that affects people of all foot shapes, age, weight and activity level. Pain may be in the arch or on the weight-bearing surface of the heel. The pain may come on without injury or identifiable cause. Pain is generally present when first getting out of bed in the morning or up from a seated break.     CAUSES  The plantar fascia is a dense fibrous band of tissue that stretches across the bottom surface of the foot. The fascia helps support the foot muscles and arch. Plantar fasciitis is thought to be caused by mechanical strain or overload. Frequent walking without shoes or wearing unsupportive shoes is thought to cause structural overload and ultimately inflammation of the plantar fascia. Some people have heel spurs  that can be seen on x-ray. The heel spur is actually a minor component of plantar fascitis and is largely ignored.       SELF TREATMENT   The easiest solution is to stop walking around your home without shoes. Plantar fasciitis is largely a shoe problem. Shoes are either not being worn often enough or your current shoes are inadequate for your weight, foot structure or activity level. The majority of shoes on the market today are not sufficient to resist development of plantar fasciitis or to promote healing. Assume that your current shoes are inadequate and will need to be replaced. Even high quality shoes wear out with 6 months to one year of frequent use. Weight loss is another option. Losing ten pounds in the next two months may be enough to resolve the problem. Ice applied to the area of pain two to three times per day for ten minutes each session can be very helpful. This should continue until the problem resolves. Achilles tendon stretching is essential. Stretch multiple times daily to promote healing and to prevent recurrence in the future.     MEDICAL TREATMENT  Medical treatments often include custom arch supports, cortisone injections, physical therapy, splints to be worn in bed, prescription medications and surgery. The home treatments listed above will be necessary regardless of these advanced medical treatments. Surgery is rarely needed but is very helpful in selected cases.     PROGNOSIS  Plantar fasciitis can last from one day to a lifetime. Some people get intermittent fascitis that is very short-lived. Others suffer daily for years. Excessive body weight, frequent bare foot walking, long hours on the feet, inadequate shoes, predisposing foot structures and excessive activity such as running are all potential issues that lead to chronic and/or recurring plantar fascitis. Having plantar fasciitis means that you are forever prone to this problem and will require modification of some of the above  factors. Most people seek treatment within one to four months. Healing usually requires a similar one to four month time frame. Healing time is relative to the amount of effort spent treating the problem.   Plantar fasciitis is highly recurrent. Risk factors often continue, including return to bare foot walking, inadequate shoes, excessive body weight, excessive activities, etc. Your life style and foot structure may predispose you to recurrent plantar fasciitis. A daily prevention regimen can be very helpful. Ongoing use of shoe inserts, careful attention to appropriate shoes, daily Achilles stretching, etc. may prevent recurrence. Prompt attention at the earliest warning signs of heel pain can resolve the problem in as short as a few days.     EXERCISES    Stair Exercise: Step on the stairs with the ball of your foot and hold your position for at least 15 seconds, then slowly step down with the heels of your foot. You can do this daily and as often as you want.   Picking the Towel: Sit comfortably and then pick the towel up with your toes. You can use any object other than a towel as long as the material can be soft and you can pick it up with your toes.  Rolling the Bottle: Use a small ball or frozen water bottle and then roll it around with your foot.   Flex the Toes: Sit comfortably and then flex your toes by pointing it towards the floor or towards your body. This will relax and flex your foot and exercise your plantar fascia, the calf, and the Achilles tendon. The inability of the foot to stretch often causes the bunching up of the plantar fascia area leading to the pain.  Calf/Achilles Stretching: Lay on you back and raise one foot, then point your toes towards the floor. See photo below:               Hold each stretch for 10 seconds. Stretch 10 times per set, three sets per day. Morning, afternoon and evening. If your heel pain is very severe in the morning, consider doing the first set of stretches before  you get out of bed.    THERAPIES DISCUSSED:  1.  Supportive Shoes: minimizing barefoot ambulation helps to provide cushion, padding and support to the ligament that is inflamed. Socks, flip flops, flats and some slippers are not typically sufficient to provide support. Shoes should be worn even indoors  2.  Insert/Orthotics: ones with an arch support built in to them provide further stress relief for the ligament. See the information below on recommended inserts.  3. Icing: using a frozen water bottle or orange, and rolling it along the bottom of the arch/heel can help to alleviate discomfort, and can act as a tissue massage to the painful, inflamed ligament.  There is evidence that shows icing at least three times daily can be beneficial  4.  Antiinflammatory (NSAID): Ibuprofen, Aleve, as well as Tylenol can be used to help decrease symptoms and improve pain levels. If you have high blood pressure, heart disease, stomach or kidney problems, use antiinflammatories sparingly. Tylenol should not be used if you have liver problems.   5. Activity Modifications: if there are certain things that you do, whether it's going barefoot or certain shoes/activities, you should try to minimize those activities as much as possible until your symptoms are sufficiently resolved. Certainly, some activities, such as running on the treadmill, are easier to take a break from versus others, such as work or chores at home. If there are certain activities that hurt your heel, and you keep doing those activities that hurt your heel, your heel will keep hurting.  **If these initial therapies are insufficient, we have our tier 2 therapies that can more aggressively work to improve your symptoms and get you back to the activities that you enjoy!    OVER THE COUNTER INSERTS    Most of these can be found at your local [employer], [employer], [employer], or online:    SuperFeet   Sofsole Fit Spenco   Power Step    Walk-Fit ([employer])  *For heel pain* Arch Cradles  *For heel pain*       **A good high quality over the counter insert should cost around $40-$50      [employer] LOCATIONS    [place]  [address] [place] [address]  [phone]   [address]  [phone] [place]  [address] [facility]  [phone]   [address]  [phone]   [place]  [address]  [phone] [place]  [address] [place] [address] [place].  [phone]   [place]  [address] [facility]  [phone]   [place]  [address] [facility]  [phone] [place]  [address] [phone]

## 2021-07-30 NOTE — PROGRESS NOTES
"Foot & Ankle Surgery  2021    CC: \" Foot pain\"    I was asked to see Claude Hayes regarding the chief complaint by:  Dr. Hernandez    HPI:  Pt is a 60 year old male who presents with above complaint.  Bilateral lower extremity foot pain x10 years.  He would like to \"understand why and how to regain function\".  \"Feels like bottom of feet are crushed -ache, stiff\".  Pain 6-7 out of 10, worse with \"standing on concrete/or feet for more than 1 hour\".  \"Stretching, Advil\" for treatment, this is not helped.  Very active but now is very limited by pain.  10 years ago, he went on a trail run and \"both feet changed\".  He has tried hard orthotics but these caused knee and hip pain.  Can be \"bad for days\".  He points to the arch but states \"everything can be sore\".  He just had an MRI of the spine, \"lower left\".  \"I feel like my feet are broken\".    ROS:   Pos for CC.  The patient denies current nausea, vomiting, chills, fevers, belly pain, calf pain, chest pain or SOB.  Complete remainder of ROS is otherwise neg.    VITALS:  There were no vitals filed for this visit.    PMH:    Past Medical History:   Diagnosis Date     Diverticulitis 2018    hosp fsd 2018     Elevated cholesterol      Heart disease      Hypertension        SXHX:    Past Surgical History:   Procedure Laterality Date     COLONOSCOPY       TONSILLECTOMY          MEDS:    Current Outpatient Medications   Medication     ASPIRIN PO     lisinopril (PRINIVIL) 10 MG tablet     rosuvastatin (CRESTOR) 20 MG tablet     No current facility-administered medications for this visit.       ALL:     Allergies   Allergen Reactions     Seasonal Allergies        FMH:    Family History   Problem Relation Age of Onset     Other - See Comments Mother         Old age,  age 98     Thyroid Disease Mother      Heart Disease Father         CABG age 54     Coronary Artery Disease Father      Hypertension Father      Hyperlipidemia Father      Substance Abuse Sister  "       SocHx:    Social History     Socioeconomic History     Marital status:      Spouse name: Not on file     Number of children: 2     Years of education: Not on file     Highest education level: Not on file   Occupational History     Occupation: [employer]    Tobacco Use     Smoking status: Never Smoker     Smokeless tobacco: Never Used   Substance and Sexual Activity     Alcohol use: Yes     Comment: 1 drink a week     Drug use: No     Sexual activity: Yes     Partners: Female   Other Topics Concern     Parent/sibling w/ CABG, MI or angioplasty before 65F 55M? Yes     Comment: Father   Social History Narrative    , 2 children, works as a [email or web address]. Was working out regularly up to 3 months ago.  (last updated 3/5/2018)      Social Determinants of Health     Financial Resource Strain:      Difficulty of Paying Living Expenses:    Food Insecurity:      Worried About Running Out of Food in the Last Year:      Ran Out of Food in the Last Year:    Transportation Needs:      Lack of Transportation (Medical):      Lack of Transportation (Non-Medical):    Physical Activity:      Days of Exercise per Week:      Minutes of Exercise per Session:    Stress:      Feeling of Stress :    Social Connections:      Frequency of Communication with Friends and Family:      Frequency of Social Gatherings with Friends and Family:      Attends Yazidism Services:      Active Member of Clubs or Organizations:      Attends Club or Organization Meetings:      Marital Status:    Intimate Partner Violence:      Fear of Current or Ex-Partner:      Emotionally Abused:      Physically Abused:      Sexually Abused:            EXAMINATION:  Gen:   No apparent distress  Neuro:   A&Ox3, no deficits  Psych:    Answering questions appropriately for age and situation with normal affect  Head:    NCAT  Eye:    Visual scanning without deficit  Ear:    Response to auditory stimuli wnl  Lung:    Non-labored breathing on RA  "noted  Abd:    NTND per patient report  Lymph:    Neg for pitting/non-pitting edema BLE  Vasc:    Pulses palpable, CFT minimally delayed  Neuro:    Light touch sensation intact to all sensory nerve distributions without paresthesias  Derm:    Neg for nodules, lesions or ulcerations  MSK:    Bilateral lower extremity -central band of the plantar fascial is tender.  There is no other arch pain.  Left lower extremity  -triple compression stress test is negative, but \"I can feel that\" with palpation along the proximal tibial nerve branches.  Right lower extremity -triple compression stress test negative, but \"I can feel that\" with palpation along the distal tibial nerve branches.  Calf:    Neg for redness, swelling or tenderness      Imaging: X-rays bilateral foot- IMPRESSION:  Right foot: No evidence of acute fracture. Normal joint spacing. Large  posterior calcaneal spur.     Left foot: No evidence of acute fracture. Normal joint spacing. Large  posterior calcaneal spur.       Assessment:  60 year old male with chronic/10-year history of bilateral lower extremity pain with exam consistent with tarsal tunnel syndrome and strain of the central band of the plantar fascia bilateral      Plan:  Discussed etiologies, anatomy and options  1.  chronic/10-year history of bilateral lower extremity pain with exam consistent with tarsal tunnel syndrome and strain of the central band of the plantar fascia bilateral  -I personally reviewed the patient's lower extremity history pertinent to today's visit, including imaging/labs, in preparation for initiating a treatment program.  -I personally reviewed and interpreted his x-rays today.  -Regarding the heel pain, the Plantar Fasciitis handout was dispensed and discussed.  We talked about stretching, resting/activity modification, icing, NSAID/tylenol use as tolerated, inserts, supportive/comfortable shoes and minimizing shoeless walking.    -discussed Achilles, plantar fascial and " hamstring stretches  -OTC insert information dispensed and discussed   -We discussed options for injections and imaging  -Based on chronicity and severity of symptoms, MRIs of both ankles were ordered for further assessment of underlying pathology.  The patient was advised to follow-up in 1 to 2 weeks to review results.  Consider diagnostic/therapeutic injections based on results    Follow up: 1 to 2 weeks or sooner with acute issues      Patient's medical history was reviewed today      Agustín Horvath DPM [facility]  Podiatric Foot & Ankle Surgeon  [facility]  [phone]    Disclaimer: This note consists of symbols derived from keyboarding, dictation and/or voice recognition software. As a result, there may be errors in the script that have gone undetected. Please consider this when interpreting information found in this chart.

## 2021-07-30 NOTE — TELEPHONE ENCOUNTER
Per Sriram Marquez PA-C:   Patient's MRI does not show any acute compression fracture or other major concerns.  I do not find an obvious source of his back pain on his MRI.  If he is having any left-sided leg pain there may be some slight nerve irritation on the left that could account for that, but as I recall from our visit he was only having back pain.  He can continue with physical therapy and if he would like to come into clinic for follow-up and to review the images that is reasonable as well.  Otherwise he can follow-up as needed.    Called and spoke to the patient. He does not have any physical therapy scheduled. He would like to schedule a telephone call to discuss imaging with Sriram further. Appointment scheduled.

## 2021-08-20 ENCOUNTER — THERAPY VISIT (OUTPATIENT)
Dept: PHYSICAL THERAPY | Facility: CLINIC | Age: 61
End: 2021-08-20
Attending: PHYSICIAN ASSISTANT
Payer: COMMERCIAL

## 2021-08-20 ENCOUNTER — HOSPITAL ENCOUNTER (OUTPATIENT)
Dept: MRI IMAGING | Facility: CLINIC | Age: 61
End: 2021-08-20
Attending: PODIATRIST
Payer: COMMERCIAL

## 2021-08-20 DIAGNOSIS — M79.671 ARCH PAIN, RIGHT: ICD-10-CM

## 2021-08-20 DIAGNOSIS — M79.672 ARCH PAIN, LEFT: ICD-10-CM

## 2021-08-20 DIAGNOSIS — M54.50 CHRONIC BILATERAL LOW BACK PAIN WITHOUT SCIATICA: ICD-10-CM

## 2021-08-20 DIAGNOSIS — G89.29 CHRONIC BILATERAL LOW BACK PAIN WITHOUT SCIATICA: ICD-10-CM

## 2021-08-20 PROCEDURE — 97161 PT EVAL LOW COMPLEX 20 MIN: CPT | Mod: GP | Performed by: PHYSICAL THERAPIST

## 2021-08-20 PROCEDURE — 73721 MRI JNT OF LWR EXTRE W/O DYE: CPT | Mod: 26 | Performed by: RADIOLOGY

## 2021-08-20 PROCEDURE — 97140 MANUAL THERAPY 1/> REGIONS: CPT | Mod: GP | Performed by: PHYSICAL THERAPIST

## 2021-08-20 PROCEDURE — 73721 MRI JNT OF LWR EXTRE W/O DYE: CPT | Mod: 50

## 2021-08-20 PROCEDURE — 97530 THERAPEUTIC ACTIVITIES: CPT | Mod: GP | Performed by: PHYSICAL THERAPIST

## 2021-08-21 PROBLEM — M54.50 CHRONIC BILATERAL LOW BACK PAIN WITHOUT SCIATICA: Status: ACTIVE | Noted: 2021-08-21

## 2021-08-21 PROBLEM — G89.29 CHRONIC BILATERAL LOW BACK PAIN WITHOUT SCIATICA: Status: ACTIVE | Noted: 2021-08-21

## 2021-08-21 NOTE — PROGRESS NOTES
Physical Therapy Initial Evaluation    Physical Therapy Initial Examination/Evaluation  August 20, 2021    Claude Hayes  is a 60 year old  male referred to physical therapy by Sriram Marquez PA-C for treatment of chronic LBP.      DOI/onset 2019  Mechanism of injury Patient reports a gradual onset of LBP over the past 2 years without a specific incident.  DOS n/a  Prior treatment none.     Chief Complaint:   Limited physical activities due to LBP.   Pain location: B LS area. Denies any radicular sx's  Quality: sharp  Constant/Intermittent: intermittent. Has no pain at rest  Time of day: no time pattern. All activity rleated  Symptoms have worsened since onset.    Current pain 2/10.  Pain at best 0/10.  Pain at worst 5/10.    Symptoms aggravated by hiking, golfing, running, and prolonged standing on concrete.    Symptoms improved with rest    Social history:  . Lives in own home.    Occupation: .  Job duties:  Computer work, prolonged sitting, prolonged standing.    Patient having difficulty with ADLs: none.    Patient's goals are to reduce LBP and resume hiking and golfing.    Patient reports general health as good.  Related medical history no hx of LBP but has current B foot pain that he is seeing a podiatrist for.     Surgical History:  none.    Imaging: MRI .    Medications:  none.       Return to MD:  As needed.      Clinical Impression: Patient should respond well to continued PT to reduce his LBP through manual therapy and therapeutic exercise.    Subjective:    Patient Health History  Claude Hayes being seen for PT for back pain.       Problem occurred: over time - running and standing on hard surfaces   Pain is reported as 2/10 on pain scale.  General health as reported by patient is good.  Pertinent medical history includes: kidney disease.     Medical allergies: none.   Surgeries include:  None.    Current medications:  High blood pressure medication and other. Other  medications details: cholesterol meds.       Primary job tasks include:  Computer work, prolonged sitting and prolonged standing.                                    Objective:  Standing Alignment:        Lumbar:  Lordosis decr  Pelvic:  Normal          Gait:    Gait Type:  Normal         Flexibility/Screens:   Positive screens:  Lumbar    Lower Extremity:  Decreased left lower extremity flexibility:Hamstrings    Decreased right lower extremity flexibility:  Hamstrings  Spine:  Decreased left spine flexibility:  Quadratus Lumborum               Lumbar/SI Evaluation  ROM:    AROM Lumbar:   Flexion:            75%  Ext:                    75%   Side Bend:        Left:  Normal    Right:  Normal  Rotation:           Left:  Normal    Right:  Normal  Side Glide:        Left:     Right:         Strength: lower abdominals 4+/5; lumbar extensors 4+/5  Lumbar Myotomes:  normal                Lumbar Dermtomes:  normal                Neural Tension/Mobility:      Left side:SLR; Slump or Lm-Lumbar  negative.     Right side:   Slump; Lm-Lumbar or SLR  negative.   Lumbar Palpation:  Palpation (lumbar): increased MF tightness/tenderness in L QL.  Tenderness present at Left:    Quadratus Lumborum and Erector Spinae        Spinal Segmental Conclusions: No pain with P/A glide lumbar spine    Level: Hypo noted at L2, L3, L4, L5 and S1      SI joint/Sacrum:    SI joint provocation tests (-)                                                       General     ROS    Assessment/Plan:    Patient is a 60 year old male with lumbar complaints.    Patient has the following significant findings with corresponding treatment plan.                Diagnosis 1:  LBP  Pain -  manual therapy, self management and education  Decreased ROM/flexibility - manual therapy and therapeutic exercise  Decreased strength - therapeutic exercise and therapeutic activities  Impaired muscle performance - neuro re-education  Decreased function - therapeutic  activities    Therapy Evaluation Codes:   1) History comprised of:   Personal factors that impact the plan of care:      Time since onset of symptoms.    Comorbidity factors that impact the plan of care are:      None.     Medications impacting care: None.  2) Examination of Body Systems comprised of:   Body structures and functions that impact the plan of care:      Lumbar spine.   Activity limitations that impact the plan of care are:      Standing and Walking.  3) Clinical presentation characteristics are:   Stable/Uncomplicated.  4) Decision-Making    Low complexity using standardized patient assessment instrument and/or measureable assessment of functional outcome.  Cumulative Therapy Evaluation is: Low complexity.    Previous and current functional limitations:  (See Goal Flow Sheet for this information)    Short term and Long term goals: (See Goal Flow Sheet for this information)     Communication ability:  Patient appears to be able to clearly communicate and understand verbal and written communication and follow directions correctly.  Treatment Explanation - The following has been discussed with the patient:   RX ordered/plan of care  Anticipated outcomes  Possible risks and side effects  This patient would benefit from PT intervention to resume normal activities.   Rehab potential is good.    Frequency:  1 X week, once daily  Duration:  for 6 weeks  Discharge Plan:  Achieve all LTG.  Independent in home treatment program.  Reach maximal therapeutic benefit.    Please refer to the daily flowsheet for treatment today, total treatment time and time spent performing 1:1 timed codes.

## 2021-08-26 ENCOUNTER — TELEPHONE (OUTPATIENT)
Dept: PODIATRY | Facility: CLINIC | Age: 61
End: 2021-08-26

## 2021-08-31 ENCOUNTER — OFFICE VISIT (OUTPATIENT)
Dept: PODIATRY | Facility: CLINIC | Age: 61
End: 2021-08-31
Payer: COMMERCIAL

## 2021-08-31 VITALS
DIASTOLIC BLOOD PRESSURE: 84 MMHG | WEIGHT: 205.4 LBS | SYSTOLIC BLOOD PRESSURE: 122 MMHG | BODY MASS INDEX: 29.41 KG/M2 | HEIGHT: 70 IN

## 2021-08-31 DIAGNOSIS — M79.671 ARCH PAIN, RIGHT: ICD-10-CM

## 2021-08-31 DIAGNOSIS — M79.672 ARCH PAIN, LEFT: Primary | ICD-10-CM

## 2021-08-31 PROCEDURE — 99213 OFFICE O/P EST LOW 20 MIN: CPT | Performed by: PODIATRIST

## 2021-08-31 RX ORDER — DEXAMETHASONE SODIUM PHOSPHATE 4 MG/ML
INJECTION, SOLUTION INTRA-ARTICULAR; INTRALESIONAL; INTRAMUSCULAR; INTRAVENOUS; SOFT TISSUE
Qty: 30 ML | Refills: 0 | Status: SHIPPED | OUTPATIENT
Start: 2021-08-31 | End: 2021-12-07

## 2021-08-31 ASSESSMENT — MIFFLIN-ST. JEOR: SCORE: 1742.94

## 2021-08-31 NOTE — PROGRESS NOTES
Foot & Ankle Surgery   August 31, 2021    S:  Pt is seen today for evaluation of bilateral arch pain and for MRI follow-ups.  He states that on his last exam, he was careful with his feet and his exam did not show significant pain but he wants to make it known that unless he is taking care of his feet, he is in severe pain.  He recently had a physical therapy appointment for his back and states that went well.    There were no vitals filed for this visit.'      ROS - Pos for CC.  Patient denies current nausea, vomiting, chills, fevers, belly pain, calf pain, chest pain or SOB.  Complete remainder of ROS it otherwise neg.      PE:  Gen:   No apparent distress  Eye:    Visual scanning without deficit  Ear:    Response to auditory stimuli wnl  Lung:    Non-labored breathing on RA noted  Abd:    NTND per patient report  Lymph:    Neg for pitting/non-pitting edema BLE  Vasc:    Pulses palpable, CFT minimally delayed  Neuro:    Light touch sensation intact to all sensory nerve distributions without paresthesias  Derm:    Neg for nodules, lesions or ulcerations  MSK:   Bilateral lower extremity -tenderness along the proximal central band of the plantar fascia.  There is minimal distal tarsal tunnel syndrome/nerve pain on the right lower extremity.  He did have some discomfort along the distal nerve branches on the left heading down to the bottom of the foot  Calf:    Neg for redness, swelling or tenderness      Imaging: MRI right ankle 8/20/2021 - Impression:  1. Navicular medial cuneiform degenerative change with opposing  subchondral edema.  2. Small edema-like marrow signal intensity at the anterior process of  calcaneus, may be due to stress change.  3. Distal Achilles tendinosis with calcaneal enthesopathy.  4. Mild fusiform thickening of the central cord of plantar aponeurosis  approximally 2.4 cm distal to the calcaneal attachment for 2 cm  length, query mild plantar fibroma.  5. Peroneal tenosynovitis and  tendinosis.    MRI left ankle 8/20/2021 - Impression:  1. Peroneal tenosynovitis and tendinosis with high-grade partial tear  of the peroneus brevis between the distal fibular tip and to the level  just proximal to the calcaneocuboid joint.  2. Subchondral osseous abnormality presumably secondary to  degenerative change at the anterior calcaneus.  3. Achilles tendinosis.  4. Thickening of the central cord of plantar aponeurosis with mild  nodular configuration, query plantar fibroma.      Assessment:  60 year old male with bilateral heel pain secondary to chronic plantar fasciitis with possible underlying distal tarsal tunnel syndrome      Plan:  Discussed etiologies, anatomy and options  1.  Bilateral heel pain secondary to chronic plantar fasciitis with possible underlying distal tarsal tunnel syndrome  -I personally reviewed and interpreted bilateral ankle MRIs with the patient today.  -As the nerve exam today is somewhat unremarkable, we will hold off on diagnostic/therapeutic nerve blocks  -Regarding the degenerative changes to the central band of the plantar fascia, I advised to continue with all home therapies, including comfortable shoes, inserts, stretching, RICE/NSAID versus Tylenol as needed based on pain  -[facility] PT referral: Follow-up as needed after PT  -We briefly discussed surgical options, including sectioning of the proximal plantar fascial central band versus removal of the proximal segment.    Follow up: As needed or sooner with acute issues           Agustín Horvath DPM FACFAS FACFAOM  Podiatric Foot & Ankle Surgeon  [facility]  [phone]    Disclaimer: This note consists of symbols derived from keyboarding, dictation and/or voice recognition software. As a result, there may be errors in the script that have gone undetected. Please consider this when interpreting information found in this chart.

## 2021-09-02 ENCOUNTER — THERAPY VISIT (OUTPATIENT)
Dept: PHYSICAL THERAPY | Facility: CLINIC | Age: 61
End: 2021-09-02
Payer: COMMERCIAL

## 2021-09-02 DIAGNOSIS — G89.29 CHRONIC BILATERAL LOW BACK PAIN WITHOUT SCIATICA: ICD-10-CM

## 2021-09-02 DIAGNOSIS — M54.50 CHRONIC BILATERAL LOW BACK PAIN WITHOUT SCIATICA: ICD-10-CM

## 2021-09-02 PROCEDURE — 97110 THERAPEUTIC EXERCISES: CPT | Mod: GP | Performed by: PHYSICAL THERAPIST

## 2021-09-02 PROCEDURE — 97140 MANUAL THERAPY 1/> REGIONS: CPT | Mod: GP | Performed by: PHYSICAL THERAPIST

## 2021-09-02 NOTE — PROGRESS NOTES
Subjective:  HPI  Physical Exam                    Objective:  System    Physical Exam    General     ROS    Assessment/Plan:    SUBJECTIVE  Subjective changes as noted by pt:   Pt. reports less LBP this week but he also has not done any of the activities that usually set off the pain. He did do a longer car trip this week which caused some L sided LBP.   Current pain level:  4/10   Changes in function:  Yes (See Goal flowsheet attached for changes in current functional level)     Adverse reaction to treatment or activity:  None    OBJECTIVE  Changes in objective findings:  ROM lumbar ext 80%. Palpation - MF tightness/tenderness in B LS musculature     ASSESSMENT  Claude continues to require intervention to meet STG and LTG's: PT  Patient is progressing as expected.  Response to therapy has shown an improvement in  pain level and ROM   Progress made towards STG/LTG?  Yes (See Goal flowsheet attached for updates on achievement of STG and LTG)    PLAN  Current treatment program is being advanced to more complex exercises.    PTA/ATC plan:  N/A    Please refer to the daily flowsheet for treatment today, total treatment time and time spent performing 1:1 timed codes.

## 2021-09-09 ENCOUNTER — THERAPY VISIT (OUTPATIENT)
Dept: PHYSICAL THERAPY | Facility: CLINIC | Age: 61
End: 2021-09-09
Payer: COMMERCIAL

## 2021-09-09 DIAGNOSIS — M79.671 BILATERAL FOOT PAIN: Primary | ICD-10-CM

## 2021-09-09 DIAGNOSIS — M79.672 BILATERAL FOOT PAIN: Primary | ICD-10-CM

## 2021-09-09 PROCEDURE — 97140 MANUAL THERAPY 1/> REGIONS: CPT | Mod: GP | Performed by: PHYSICAL THERAPIST

## 2021-09-09 PROCEDURE — 97110 THERAPEUTIC EXERCISES: CPT | Mod: GP | Performed by: PHYSICAL THERAPIST

## 2021-09-09 PROCEDURE — 97161 PT EVAL LOW COMPLEX 20 MIN: CPT | Mod: GP | Performed by: PHYSICAL THERAPIST

## 2021-09-09 NOTE — PROGRESS NOTES
"Physical Therapy Initial Evaluation    Physical Therapy Initial Examination/Evaluation  September 9, 2021    Cladue Hayes  is a 60 year old  male referred to physical therapy by Dr. Horvath for treatment of B foot pain.      DOI/onset 2011  Mechanism of injury Patient had onset of B plantar foot pain after a trail running race 10 years ago. The feet \"have never been the same since.\"  DOS n/a  Prior treatment 10 years ago patient saw a podiatrist and was given custom orthotics. He has tried countless over the counter inserts and shoes since. Effect of prior treatment custom orthotics made his feet worse. The inserts have not helped.     Chief Complaint:   Inability to do prolonged walking or hiking due to foot pain   Pain location: mainly in plantar aspect of the feet.  Quality: sharp  Constant/Intermittent: intermittent  Time of day: no time pattern  Symptoms have remained the same since onset.    Current pain 2/10.  Pain at best 2/10.  Pain at worst 7/10.    Symptoms aggravated by prolonged standing, prolonged walking, hiking, running.    Symptoms improved with rest.     Social history:  .    Occupation: .  Job duties:  Prolonged sitting, prolonged standing.    Patient having difficulty with ADLs: none.    Patient's goals are to reduce pain and resume at least longer walks and hiking.    Patient reports general health as excellent.  Related medical history none prior to the 10 year hx already mentioned.    Surgical History:  none.    Imaging: recent MRI.    Medications:  none.       Return to MD:  As needed.      Clinical Impression: Patient will hopefully respond to continued PT intervention including manual therapy, therapeutic exercise and modalities including iontophoresis and ultrasound.    Subjective:    Patient Health History  Claude Hayes being seen for PT.       Problem occurred: over time   Pain is reported as 2/10 on pain scale.  General health as reported by patient is " excellent.       Medical allergies: none.   Surgeries include:  None.        Current occupation is .                                       Objective:  Standing Alignment:                Ankle/Foot:  Pes cavus R and pes cavus L    Gait:  Slight decrease in heel strike on L  Gait Type:  Antalgic         Flexibility/Screens:       Lower Extremity:  Decreased left lower extremity flexibility:Gastroc and Soleus    Decreased right lower extremity flexibility:  Gastroc and Soleus          Ankle/Foot Evaluation  ROM:    AROM:    Dorsiflexion:  Left:   9  Right:   12  Plantarflexion:  Left:  65    Right:  68  Inversion:  Left:  45     Right:  42  Eversion:  23     Right:  20        Strength:    Dorsiflexion:  Left: 5/5     Pain:   Right: 5/5   Pain:  Plantarflexion: Left: 5/5   Pain:   Right: 5/5  Pain:  Inversion:Left: 4/5  Pain:     Right: 4/5  Pain:  Eversion:Left: 4+/5  Pain:  Right: 4+/5  Pain:          Peroneals: Left: 4/5  Pain:  Right: 4/5  Pain:        LIGAMENT TESTING: normal                PALPATION:   Left ankle tenderness present at:  achilles tendon; plantar fascia and peroneals  Right ankle tenderness present at:   achilles tendon and plantar fascia  EDEMA: normal          MOBILITY TESTING:       Talocrural Left: hypomobile                                                              General     ROS    Assessment/Plan:    Patient is a 60 year old male with both sides ankle complaints.    Patient has the following significant findings with corresponding treatment plan.                Diagnosis 1:  B foot pain  Pain -  US, manual therapy, self management and education  Decreased ROM/flexibility - manual therapy and therapeutic exercise  Decreased strength - therapeutic exercise and therapeutic activities  Inflammation - iontophoresis  Impaired muscle performance - neuro re-education  Decreased function - therapeutic activities    Therapy Evaluation Codes:   1) History comprised of:   Personal factors that  impact the plan of care:      Time since onset of symptoms.    Comorbidity factors that impact the plan of care are:      None.     Medications impacting care: None.  2) Examination of Body Systems comprised of:   Body structures and functions that impact the plan of care:      Ankle.   Activity limitations that impact the plan of care are:      Standing and Walking.  3) Clinical presentation characteristics are:   Stable/Uncomplicated.  4) Decision-Making    Low complexity using standardized patient assessment instrument and/or measureable assessment of functional outcome.  Cumulative Therapy Evaluation is: Low complexity.    Previous and current functional limitations:  (See Goal Flow Sheet for this information)    Short term and Long term goals: (See Goal Flow Sheet for this information)     Communication ability:  Patient appears to be able to clearly communicate and understand verbal and written communication and follow directions correctly.  Treatment Explanation - The following has been discussed with the patient:   RX ordered/plan of care  Anticipated outcomes  Possible risks and side effects  This patient would benefit from PT intervention to resume normal activities.   Rehab potential is good.    Frequency:  1 X week, once daily  Duration:  for 8 weeks  Discharge Plan:  Achieve all LTG.  Independent in home treatment program.  Reach maximal therapeutic benefit.    Please refer to the daily flowsheet for treatment today, total treatment time and time spent performing 1:1 timed codes.

## 2021-09-13 ENCOUNTER — THERAPY VISIT (OUTPATIENT)
Dept: PHYSICAL THERAPY | Facility: CLINIC | Age: 61
End: 2021-09-13
Payer: COMMERCIAL

## 2021-09-13 DIAGNOSIS — M54.50 CHRONIC BILATERAL LOW BACK PAIN WITHOUT SCIATICA: ICD-10-CM

## 2021-09-13 DIAGNOSIS — G89.29 CHRONIC BILATERAL LOW BACK PAIN WITHOUT SCIATICA: ICD-10-CM

## 2021-09-13 PROCEDURE — 97140 MANUAL THERAPY 1/> REGIONS: CPT | Mod: GP | Performed by: PHYSICAL THERAPIST

## 2021-09-13 PROCEDURE — 97110 THERAPEUTIC EXERCISES: CPT | Mod: GP | Performed by: PHYSICAL THERAPIST

## 2021-09-17 ENCOUNTER — THERAPY VISIT (OUTPATIENT)
Dept: PHYSICAL THERAPY | Facility: CLINIC | Age: 61
End: 2021-09-17
Payer: COMMERCIAL

## 2021-09-17 DIAGNOSIS — M79.672 BILATERAL FOOT PAIN: Primary | ICD-10-CM

## 2021-09-17 DIAGNOSIS — M79.671 BILATERAL FOOT PAIN: Primary | ICD-10-CM

## 2021-09-17 PROCEDURE — 97110 THERAPEUTIC EXERCISES: CPT | Mod: GP | Performed by: PHYSICAL THERAPIST

## 2021-09-17 PROCEDURE — 97140 MANUAL THERAPY 1/> REGIONS: CPT | Mod: GP | Performed by: PHYSICAL THERAPIST

## 2021-09-17 PROCEDURE — 97033 APP MDLTY 1+IONTPHRSIS EA 15: CPT | Mod: GP | Performed by: PHYSICAL THERAPIST

## 2021-09-17 NOTE — PROGRESS NOTES
Subjective:  HPI  Physical Exam                    Objective:  System    Physical Exam    General     ROS    Assessment/Plan:    SUBJECTIVE  Subjective changes as noted by pt:   Pt. reports not doing anything this week to aggravate his foot pain. The ex's are going well.   Current pain level:  4/10   Changes in function:  Yes (See Goal flowsheet attached for changes in current functional level)     Adverse reaction to treatment or activity:  None    OBJECTIVE  Changes in objective findings:  Palpation - MF tightness/sharp tenderness in B plantar feet     ASSESSMENT  Claude continues to require intervention to meet STG and LTG's: PT  Patient is progressing as expected.  Response to therapy has shown an improvement in  pain level  Progress made towards STG/LTG?  Yes (See Goal flowsheet attached for updates on achievement of STG and LTG)    PLAN  Current treatment program is being advanced to more complex exercises.    PTA/ATC plan:  N/A    Please refer to the daily flowsheet for treatment today, total treatment time and time spent performing 1:1 timed codes.

## 2021-09-23 ENCOUNTER — THERAPY VISIT (OUTPATIENT)
Dept: PHYSICAL THERAPY | Facility: CLINIC | Age: 61
End: 2021-09-23
Payer: COMMERCIAL

## 2021-09-23 DIAGNOSIS — M54.50 CHRONIC BILATERAL LOW BACK PAIN WITHOUT SCIATICA: ICD-10-CM

## 2021-09-23 DIAGNOSIS — G89.29 CHRONIC BILATERAL LOW BACK PAIN WITHOUT SCIATICA: ICD-10-CM

## 2021-09-23 PROCEDURE — 97140 MANUAL THERAPY 1/> REGIONS: CPT | Mod: GP | Performed by: PHYSICAL THERAPIST

## 2021-09-23 PROCEDURE — 97110 THERAPEUTIC EXERCISES: CPT | Mod: GP | Performed by: PHYSICAL THERAPIST

## 2021-09-29 ENCOUNTER — THERAPY VISIT (OUTPATIENT)
Dept: PHYSICAL THERAPY | Facility: CLINIC | Age: 61
End: 2021-09-29
Payer: COMMERCIAL

## 2021-09-29 DIAGNOSIS — M54.50 CHRONIC BILATERAL LOW BACK PAIN WITHOUT SCIATICA: ICD-10-CM

## 2021-09-29 DIAGNOSIS — G89.29 CHRONIC BILATERAL LOW BACK PAIN WITHOUT SCIATICA: ICD-10-CM

## 2021-09-29 PROCEDURE — 97110 THERAPEUTIC EXERCISES: CPT | Mod: GP | Performed by: PHYSICAL THERAPIST

## 2021-09-29 PROCEDURE — 97033 APP MDLTY 1+IONTPHRSIS EA 15: CPT | Mod: GP | Performed by: PHYSICAL THERAPIST

## 2021-09-29 PROCEDURE — 97140 MANUAL THERAPY 1/> REGIONS: CPT | Mod: GP | Performed by: PHYSICAL THERAPIST

## 2021-09-30 NOTE — PROGRESS NOTES
Subjective:  HPI  Physical Exam  Oswestry Score: 8 %                 Objective:  System    Physical Exam    General     ROS    Assessment/Plan:    SUBJECTIVE  Subjective changes as noted by pt:   Pt. had a Christianity event on Sunday where he was standing alot and his foot pain increased considerably but the LBP did not. He was pleasantly suprised as this would normally aggravate his LBP as well as foot pain.     Current pain level: 4/10   Changes in function:  Yes (See Goal flowsheet attached for changes in current functional level)     Adverse reaction to treatment or activity:  None    OBJECTIVE  Changes in objective findings:  ROM lumbar ext 90%; B Rot 80%. Palpation - decreasing tightness in B QL. B plantar feet tightness/tenderness.     ASSESSMENT  Claude continues to require intervention to meet STG and LTG's: PT  Patient's symptoms are resolving.  Response to therapy has shown an improvement in  pain level and ROM   Progress made towards STG/LTG?  Yes (See Goal flowsheet attached for updates on achievement of STG and LTG)    PLAN  Current treatment program is being advanced to more complex exercises.    PTA/ATC plan:  N/A    Please refer to the daily flowsheet for treatment today, total treatment time and time spent performing 1:1 timed codes.

## 2021-10-08 ENCOUNTER — THERAPY VISIT (OUTPATIENT)
Dept: PHYSICAL THERAPY | Facility: CLINIC | Age: 61
End: 2021-10-08
Payer: COMMERCIAL

## 2021-10-08 DIAGNOSIS — M54.50 CHRONIC BILATERAL LOW BACK PAIN WITHOUT SCIATICA: ICD-10-CM

## 2021-10-08 DIAGNOSIS — G89.29 CHRONIC BILATERAL LOW BACK PAIN WITHOUT SCIATICA: ICD-10-CM

## 2021-10-08 PROCEDURE — 97530 THERAPEUTIC ACTIVITIES: CPT | Mod: GP | Performed by: PHYSICAL THERAPIST

## 2021-10-08 PROCEDURE — 97140 MANUAL THERAPY 1/> REGIONS: CPT | Mod: GP | Performed by: PHYSICAL THERAPIST

## 2021-10-10 ENCOUNTER — HEALTH MAINTENANCE LETTER (OUTPATIENT)
Age: 61
End: 2021-10-10

## 2021-10-20 ENCOUNTER — THERAPY VISIT (OUTPATIENT)
Dept: PHYSICAL THERAPY | Facility: CLINIC | Age: 61
End: 2021-10-20
Payer: COMMERCIAL

## 2021-10-20 DIAGNOSIS — G89.29 CHRONIC BILATERAL LOW BACK PAIN WITHOUT SCIATICA: ICD-10-CM

## 2021-10-20 DIAGNOSIS — M79.671 BILATERAL FOOT PAIN: Primary | ICD-10-CM

## 2021-10-20 DIAGNOSIS — M79.672 BILATERAL FOOT PAIN: Primary | ICD-10-CM

## 2021-10-20 DIAGNOSIS — M54.50 CHRONIC BILATERAL LOW BACK PAIN WITHOUT SCIATICA: ICD-10-CM

## 2021-10-20 PROCEDURE — 97140 MANUAL THERAPY 1/> REGIONS: CPT | Mod: GP | Performed by: PHYSICAL THERAPIST

## 2021-10-20 PROCEDURE — 97033 APP MDLTY 1+IONTPHRSIS EA 15: CPT | Mod: GP | Performed by: PHYSICAL THERAPIST

## 2021-10-20 PROCEDURE — 97530 THERAPEUTIC ACTIVITIES: CPT | Mod: GP | Performed by: PHYSICAL THERAPIST

## 2021-10-20 NOTE — PROGRESS NOTES
Subjective:  HPI  Physical Exam                    Objective:  System    Physical Exam    General     ROS    Assessment/Plan:    PROGRESS  REPORT    Progress reporting period is from 8-20-21 to 10-20-21.       SUBJECTIVE  Subjective changes noted by patient:   Pt. has made moderate progress in PT thus far with decreasing low back and B foot pain. He still has pain in both areas but the intensity is decreasing. He also notes improved tolerance to prolonged standing and walking compared to before.       Current pain level is  3/10.     Previous pain level was  5/10.   Changes in function:  Yes (See Goal flowsheet attached for changes in current functional level)  Adverse reaction to treatment or activity: None    OBJECTIVE  Changes noted in objective findings: ROM lumbar ext 90%; B Rot 90%. Palpation - decreasing MF tightness/tenderness in B QL and B plantar feet.      ASSESSMENT/PLAN  Updated problem list and treatment plan: Diagnosis 1:  LBP  Pain -  manual therapy, self management and education  Decreased ROM/flexibility - manual therapy and therapeutic exercise  Decreased strength - therapeutic exercise and therapeutic activities  Impaired muscle performance - neuro re-education  Decreased function - therapeutic activities  Diagnosis 2:  B foot pain   Pain -  manual therapy, self management and education  Decreased ROM/flexibility - manual therapy and therapeutic exercise    Decreased strength - therapeutic exercise and therapeutic activities  Inflammation - iontophoresis  Impaired gait - gait training  Impaired muscle performance - neuro re-education  Decreased function - therapeutic activities  STG/LTGs have been met or progress has been made towards goals:  Yes (See Goal flow sheet completed today.)  Assessment of Progress: The patient's condition is improving.  Self Management Plans:  Patient has been instructed in a home treatment program.  Patient  has been instructed in self management of symptoms.  I have  re-evaluated this patient and find that the nature, scope, duration and intensity of the therapy is appropriate for the medical condition of the patient.  Claude continues to require the following intervention to meet STG and LTG's:  PT    Recommendations:  This patient would benefit from continued therapy.     Frequency:  1 X week, once daily  Duration:  for 6 weeks        Please refer to the daily flowsheet for treatment today, total treatment time and time spent performing 1:1 timed codes.

## 2021-10-28 ENCOUNTER — THERAPY VISIT (OUTPATIENT)
Dept: PHYSICAL THERAPY | Facility: CLINIC | Age: 61
End: 2021-10-28
Payer: COMMERCIAL

## 2021-10-28 DIAGNOSIS — G89.29 CHRONIC BILATERAL LOW BACK PAIN WITHOUT SCIATICA: ICD-10-CM

## 2021-10-28 DIAGNOSIS — M54.50 CHRONIC BILATERAL LOW BACK PAIN WITHOUT SCIATICA: ICD-10-CM

## 2021-10-28 PROCEDURE — 97530 THERAPEUTIC ACTIVITIES: CPT | Mod: GP | Performed by: PHYSICAL THERAPIST

## 2021-10-28 PROCEDURE — 97140 MANUAL THERAPY 1/> REGIONS: CPT | Mod: GP | Performed by: PHYSICAL THERAPIST

## 2021-10-28 PROCEDURE — 97033 APP MDLTY 1+IONTPHRSIS EA 15: CPT | Mod: GP | Performed by: PHYSICAL THERAPIST

## 2021-11-04 ENCOUNTER — THERAPY VISIT (OUTPATIENT)
Dept: PHYSICAL THERAPY | Facility: CLINIC | Age: 61
End: 2021-11-04
Payer: COMMERCIAL

## 2021-11-04 DIAGNOSIS — M54.50 CHRONIC BILATERAL LOW BACK PAIN WITHOUT SCIATICA: ICD-10-CM

## 2021-11-04 DIAGNOSIS — G89.29 CHRONIC BILATERAL LOW BACK PAIN WITHOUT SCIATICA: ICD-10-CM

## 2021-11-04 PROCEDURE — 97033 APP MDLTY 1+IONTPHRSIS EA 15: CPT | Mod: GP | Performed by: PHYSICAL THERAPIST

## 2021-11-04 PROCEDURE — 97530 THERAPEUTIC ACTIVITIES: CPT | Mod: GP | Performed by: PHYSICAL THERAPIST

## 2021-11-04 PROCEDURE — 97140 MANUAL THERAPY 1/> REGIONS: CPT | Mod: GP | Performed by: PHYSICAL THERAPIST

## 2021-11-04 NOTE — PROGRESS NOTES
Subjective:  HPI  Physical Exam                    Objective:  System    Physical Exam    General     ROS    Assessment/Plan:    SUBJECTIVE  Subjective changes as noted by pt:   Pt. cont to note steady progress with decreasing low back and B foot pain with standing and walking.   Current pain level:  3/10   Changes in function:  Yes (See Goal flowsheet attached for changes in current functional level)     Adverse reaction to treatment or activity:  None    OBJECTIVE  Changes in objective findings:  ROM - lumbar R Rot normal; L Rot 90%. Palpation - decreasing MF tightness/tenderness in B plantar feet and B QL.     ASSESSMENT  Claude continues to require intervention to meet STG and LTG's: PT  Patient's symptoms are resolving.  Response to therapy has shown an improvement in  pain level and ROM   Progress made towards STG/LTG?  Yes (See Goal flowsheet attached for updates on achievement of STG and LTG)    PLAN  Current treatment program is being advanced to more complex exercises.    PTA/ATC plan:  N/A    Please refer to the daily flowsheet for treatment today, total treatment time and time spent performing 1:1 timed codes.

## 2021-12-07 ENCOUNTER — HOSPITAL ENCOUNTER (EMERGENCY)
Facility: CLINIC | Age: 61
Discharge: HOME | End: 2021-12-07
Attending: EMERGENCY MEDICINE | Admitting: EMERGENCY MEDICINE
Payer: COMMERCIAL

## 2021-12-07 ENCOUNTER — APPOINTMENT (OUTPATIENT)
Dept: GENERAL RADIOLOGY | Facility: CLINIC | Age: 61
End: 2021-12-07
Attending: EMERGENCY MEDICINE
Payer: COMMERCIAL

## 2021-12-07 ENCOUNTER — OFFICE VISIT (OUTPATIENT)
Dept: URGENT CARE | Facility: URGENT CARE | Age: 61
End: 2021-12-07
Payer: COMMERCIAL

## 2021-12-07 ENCOUNTER — NURSE TRIAGE (OUTPATIENT)
Dept: FAMILY MEDICINE | Facility: CLINIC | Age: 61
End: 2021-12-07
Payer: COMMERCIAL

## 2021-12-07 VITALS
HEIGHT: 70 IN | DIASTOLIC BLOOD PRESSURE: 89 MMHG | WEIGHT: 198 LBS | HEART RATE: 66 BPM | TEMPERATURE: 96.7 F | RESPIRATION RATE: 20 BRPM | SYSTOLIC BLOOD PRESSURE: 130 MMHG | OXYGEN SATURATION: 99 % | BODY MASS INDEX: 28.35 KG/M2

## 2021-12-07 VITALS
TEMPERATURE: 97.6 F | OXYGEN SATURATION: 96 % | HEART RATE: 71 BPM | RESPIRATION RATE: 12 BRPM | SYSTOLIC BLOOD PRESSURE: 155 MMHG | DIASTOLIC BLOOD PRESSURE: 92 MMHG

## 2021-12-07 DIAGNOSIS — R07.89 CHEST PRESSURE: ICD-10-CM

## 2021-12-07 DIAGNOSIS — R07.89 CHEST TIGHTNESS: Primary | ICD-10-CM

## 2021-12-07 LAB
ANION GAP SERPL CALCULATED.3IONS-SCNC: 3 MMOL/L (ref 3–14)
BASOPHILS # BLD AUTO: 0.1 10E3/UL (ref 0–0.2)
BASOPHILS NFR BLD AUTO: 1 %
BUN SERPL-MCNC: 16 MG/DL (ref 7–30)
CALCIUM SERPL-MCNC: 8.5 MG/DL (ref 8.5–10.1)
CHLORIDE BLD-SCNC: 106 MMOL/L (ref 94–109)
CO2 SERPL-SCNC: 30 MMOL/L (ref 20–32)
CREAT SERPL-MCNC: 0.92 MG/DL (ref 0.66–1.25)
EOSINOPHIL # BLD AUTO: 0.2 10E3/UL (ref 0–0.7)
EOSINOPHIL NFR BLD AUTO: 2 %
ERYTHROCYTE [DISTWIDTH] IN BLOOD BY AUTOMATED COUNT: 12.5 % (ref 10–15)
GFR SERPL CREATININE-BSD FRML MDRD: 89 ML/MIN/1.73M2
GLUCOSE BLD-MCNC: 96 MG/DL (ref 70–99)
HCT VFR BLD AUTO: 48.6 % (ref 40–53)
HGB BLD-MCNC: 16.3 G/DL (ref 13.3–17.7)
IMM GRANULOCYTES # BLD: 0 10E3/UL
IMM GRANULOCYTES NFR BLD: 0 %
LYMPHOCYTES # BLD AUTO: 3.1 10E3/UL (ref 0.8–5.3)
LYMPHOCYTES NFR BLD AUTO: 36 %
MCH RBC QN AUTO: 29.2 PG (ref 26.5–33)
MCHC RBC AUTO-ENTMCNC: 33.5 G/DL (ref 31.5–36.5)
MCV RBC AUTO: 87 FL (ref 78–100)
MONOCYTES # BLD AUTO: 0.7 10E3/UL (ref 0–1.3)
MONOCYTES NFR BLD AUTO: 9 %
NEUTROPHILS # BLD AUTO: 4.6 10E3/UL (ref 1.6–8.3)
NEUTROPHILS NFR BLD AUTO: 52 %
NRBC # BLD AUTO: 0 10E3/UL
NRBC BLD AUTO-RTO: 0 /100
PLATELET # BLD AUTO: 325 10E3/UL (ref 150–450)
POTASSIUM BLD-SCNC: 4.4 MMOL/L (ref 3.4–5.3)
RBC # BLD AUTO: 5.58 10E6/UL (ref 4.4–5.9)
SODIUM SERPL-SCNC: 139 MMOL/L (ref 133–144)
TROPONIN I SERPL HS-MCNC: 6 NG/L
WBC # BLD AUTO: 8.7 10E3/UL (ref 4–11)

## 2021-12-07 PROCEDURE — 99213 OFFICE O/P EST LOW 20 MIN: CPT | Mod: 25 | Performed by: FAMILY MEDICINE

## 2021-12-07 PROCEDURE — 71046 X-RAY EXAM CHEST 2 VIEWS: CPT

## 2021-12-07 PROCEDURE — 93005 ELECTROCARDIOGRAM TRACING: CPT

## 2021-12-07 PROCEDURE — 85025 COMPLETE CBC W/AUTO DIFF WBC: CPT | Performed by: EMERGENCY MEDICINE

## 2021-12-07 PROCEDURE — 80048 BASIC METABOLIC PNL TOTAL CA: CPT | Performed by: EMERGENCY MEDICINE

## 2021-12-07 PROCEDURE — 36415 COLL VENOUS BLD VENIPUNCTURE: CPT | Performed by: EMERGENCY MEDICINE

## 2021-12-07 PROCEDURE — 84484 ASSAY OF TROPONIN QUANT: CPT | Performed by: EMERGENCY MEDICINE

## 2021-12-07 PROCEDURE — 250N000013 HC RX MED GY IP 250 OP 250 PS 637: Performed by: EMERGENCY MEDICINE

## 2021-12-07 PROCEDURE — 99285 EMERGENCY DEPT VISIT HI MDM: CPT

## 2021-12-07 PROCEDURE — 93000 ELECTROCARDIOGRAM COMPLETE: CPT | Performed by: FAMILY MEDICINE

## 2021-12-07 RX ORDER — ASPIRIN 81 MG/1
162 TABLET, CHEWABLE ORAL ONCE
Status: COMPLETED | OUTPATIENT
Start: 2021-12-07 | End: 2021-12-07

## 2021-12-07 RX ADMIN — ASPIRIN 81 MG CHEWABLE TABLET 162 MG: 81 TABLET CHEWABLE at 18:31

## 2021-12-07 ASSESSMENT — ENCOUNTER SYMPTOMS
FEVER: 0
CHEST TIGHTNESS: 1
VOMITING: 0
COUGH: 0
DIAPHORESIS: 0
BACK PAIN: 0
ARTHRALGIAS: 0

## 2021-12-07 ASSESSMENT — MIFFLIN-ST. JEOR: SCORE: 1704.37

## 2021-12-07 NOTE — TELEPHONE ENCOUNTER
Pt called     C/o chest tightness Onset 2 days ago     2-3 times today   Lasts 2 minutes   No SOB or other symptoms   No pain but maybe pressure   No pain in arm or jaw  Cardiac risk factors: family hx of arteriosclerosis  Does have hx of HTN, does not check BP at home    BP Readings from Last 3 Encounters:   08/31/21 122/84   07/30/21 134/80   07/27/21 (!) 144/87     No recent anxiety increase recently   Did a good strength work out did not trigger this per pt    Possibly increasing slightly in frequency     PER PROTOCOL: GO TO UC/ED/OV NOW - SECOND LEVEL TRIAGE NEEDED   Unable to reach PCP by phone, huddled with another provider on site. Advised pt can go to UC now  but be advised my be redirected to ED. Also go to ED if develops new/worsening symptoms     Pt agreeable to recommendation    Jackie CELESTIN, Triage RN  [facility]       Reason for Disposition    Chest pain or 'angina' comes and goes and is happening more often (increasing in frequency) or getting worse (increasing in severity) (Exception: chest pains that last only a few seconds)    Additional Information    Negative: Severe difficulty breathing (e.g., struggling for each breath, speaks in single words)    Negative: Passed out (i.e., fainted, collapsed and was not responding)    Negative: Difficult to awaken or acting confused (e.g., disoriented, slurred speech)    Negative: Shock suspected (e.g., cold/pale/clammy skin, too weak to stand, low BP, rapid pulse)    Negative: Chest pain lasting longer than 5 minutes and ANY of the following:* Over 45 years old* Over 30 years old and at least one cardiac risk factor (e.g., diabetes, high blood pressure, high cholesterol, smoker, or strong family history of heart disease)* History of heart disease (i.e., angina, heart attack, heart failure, bypass surgery, takes nitroglycerin)* Pain is crushing, pressure-like, or heavy    Negative: Heart beating < 50 beats per minute OR > 140  beats per minute    Negative: Visible sweat on face or sweat dripping down face    Negative: Sounds like a life-threatening emergency to the triager    Negative: Followed an injury to chest    Negative: SEVERE chest pain    Negative: Pain also in shoulder(s) or arm(s) or jaw    Negative: Difficulty breathing    Negative: Cocaine use within last 3 days    Negative: Major surgery in the past month    Negative: Hip or leg fracture (broken bone) in past month (or had cast on leg or ankle in past month)    Negative: Illness requiring prolonged bedrest in past month (e.g., immobilization, long hospital stay)    Negative: Long-distance travel in past month (e.g., car, bus, train, plane; with trip lasting 6 or more hours)    Negative: History of prior 'blood clot' in leg or lungs (i.e., deep vein thrombosis, pulmonary embolism)    Negative: History of inherited increased risk of blood clots (e.g., Factor 5 Leiden, Anti-thrombin 3, Protein C or Protein S deficiency, Prothrombin mutation)    Negative: Heart beating irregularly or very rapidly    Protocols used: CHEST PAIN-A-[place]

## 2021-12-07 NOTE — ED TRIAGE NOTES
Patient complaints of chest pain for 3 days.  Seen at Urgent Care today.  States it feels like tightness.

## 2021-12-07 NOTE — PROGRESS NOTES
SUBJECTIVE: Claude Hayes is a 61 year old male presenting with a chief complaint of left sided Chest tightness.  Onset of symptoms was today ago.  Course of illness is same.    Severity moderate  Current and Associated symptoms: none  Treatment measures tried include None tried.  Predisposing factors include FMHx of cardiac issues.    Past Medical History:   Diagnosis Date     Diverticulitis 2018    hosp [facility] 2018     Elevated cholesterol      Heart disease      Hypertension      Allergies   Allergen Reactions     Seasonal Allergies      Social History     Tobacco Use     Smoking status: Never Smoker     Smokeless tobacco: Never Used   Substance Use Topics     Alcohol use: Yes     Comment: 1 drink a week       ROS:  SKIN: no rash  GI: no vomiting    OBJECTIVE:  BP (!) 155/92   Pulse 71   Temp 97.6  F (36.4  C) (Tympanic)   Resp 12   SpO2 96% GENERAL APPEARANCE: healthy, alert and no distress  EYES: EOMI,  PERRL, conjunctiva clear  HENT: ear canals and TM's normal.  Nose and mouth without ulcers, erythema or lesions  RESP: lungs clear to auscultation - no rales, rhonchi or wheezes  CV: regular rates and rhythm, normal S1 S2, no murmur noted  SKIN: no suspicious lesions or rashes    EKG NSR without acute st chanegs      ICD-10-CM    1. Chest tightness  R07.89 EKG 12-lead complete w/read - Clinics     Pt will go through ED for cont w/u of Chest Tightness

## 2021-12-08 NOTE — ED PROVIDER NOTES
History   Chief Complaint:  Chest Pressure     The history is provided by the patient.      Claude Hayes is a 61 year old male with history of coronary artery calcification who presents with chest pressure. The patient reports that he began experiencing intermittent chest pressure about 2 to 3 days ago. He describes his pressure as a sensation of intermittent tightness, which became more frequent and intense today. This sensation does not worsen with exertion. He has never experienced this in the past. Of note, the patient reports a family history of heart disease. He notes a personal history of high cholesterol and hypertension. He reports no history of diabetes. The patient states that he last underwent a stress test about 6 years ago. He is not a smoker. There have been no recent illnesses or increased stress. The patient reports no arm, jaw, or back pain. He is not experiencing leg swelling. The patient reportedly does not have any rashes. He also denies fever, cough, diaphoresis, or vomiting.     Review of Systems   Constitutional: Negative for diaphoresis and fever.   Respiratory: Positive for chest tightness. Negative for cough.    Cardiovascular: Negative for leg swelling.   Gastrointestinal: Negative for vomiting.   Musculoskeletal: Negative for arthralgias (arms, jaw) and back pain.   Skin: Negative for rash.   All other systems reviewed and are negative.      Allergies:  The patient has no known allergies.     Medications:  Lisinopril  Rosuvastatin    Past Medical History:     Coronary artery calcification  Diverticulitis  Hypertension  Hyperlipidemia  Lumbago  Perforation of sigmoid diverticulitis  Sepsis       Past Surgical History:    Colonoscopy  Tonsillectomy     Family History:    Mother: Thyroid disease  Father: CAD, hypertension, hyperlipidemia  Sister: Substance abuse    Social History:  Presents to the emergency department alone  Patient is   Currently employed as a [employer]  "    Physical Exam     Patient Vitals for the past 24 hrs:   BP Temp Temp src Pulse Resp SpO2 Height Weight   12/07/21 1925 130/89 -- -- 66 20 99 % -- --   12/07/21 1906 -- -- -- 67 22 -- -- --   12/07/21 1702 (!) 148/88 (!) 96.7  F (35.9  C) Temporal 69 14 99 % 1.778 m (5' 10\") 89.8 kg (198 lb)       Physical Exam  General: Alert and cooperative with exam. Patient in mild distress. Normal mentation.  Head:  Scalp is NC/AT  Eyes:  No scleral icterus, PERRL  ENT:  The external nose and ears are normal.   Neck:  Normal range of motion without rigidity.  CV:  Regular rate and rhythm    No pathologic murmur   Resp:  Breath sounds are clear bilaterally    Non-labored, no retractions or accessory muscle use  GI:  Abdomen is soft, no distension, no tenderness. No peritoneal signs  MS:  No lower extremity edema   Skin:  Warm and dry, No rash or lesions noted.  Neuro: Oriented x 3. No gross motor deficits.      Emergency Department Course   ECG  ECG obtained at 1710, ECG read at 1725  Normal sinus rhythm   Rate 67 bpm. IL interval 126 ms. QRS duration 106 ms. QT/QTc 444/469 ms. P-R-T axes 61 33 14.     Imaging:  Chest XR,  PA & LAT   Final Result   IMPRESSION: Negative chest.      Exercise Stress Echocardiogram    (Results Pending)     Report per radiology    Laboratory:  Labs Ordered and Resulted from Time of ED Arrival to Time of ED Departure   BASIC METABOLIC PANEL - Normal       Result Value    Sodium 139      Potassium 4.4      Chloride 106      Carbon Dioxide (CO2) 30      Anion Gap 3      Urea Nitrogen 16      Creatinine 0.92      Calcium 8.5      Glucose 96      GFR Estimate 89     TROPONIN I - Normal    Troponin I High Sensitivity 6     CBC WITH PLATELETS AND DIFFERENTIAL    WBC Count 8.7      RBC Count 5.58      Hemoglobin 16.3      Hematocrit 48.6      MCV 87      MCH 29.2      MCHC 33.5      RDW 12.5      Platelet Count 325      % Neutrophils 52      % Lymphocytes 36      % Monocytes 9      % Eosinophils 2   "    % Basophils 1      % Immature Granulocytes 0      NRBCs per 100 WBC 0      Absolute Neutrophils 4.6      Absolute Lymphocytes 3.1      Absolute Monocytes 0.7      Absolute Eosinophils 0.2      Absolute Basophils 0.1      Absolute Immature Granulocytes 0.0      Absolute NRBCs 0.0          Emergency Department Course:  Reviewed:  I reviewed nursing notes, vitals, past medical history and Care Everywhere    Assessments:  1821 I obtained history and examined the patient as noted above.   1926 I rechecked the patient and explained findings.    Interventions:  1831 Aspirin 162 mg PO    Disposition:  The patient was discharged to home.     Impression & Plan     Medical Decision Making:  Patient is a 61-year-old male who presents with 3-day history of intermittent chest pressure; currently resolved.  Patient's medical history and records were reviewed.  Initial consideration for, but not limited to, ACS/MI, esophageal spasm/GERD, pericarditis/myocarditis, infectious process, pneumothorax, MSK pain, among others.  Labs, EKG, and imaging was obtained.  EKG demonstrates normal sinus rhythm without evidence of acute ischemia, infarction, or arrhythmia.  Chest x-ray negative.  He was provided 162 mg aspirin.  Labs returned unremarkable including negative troponin.  No emergent cause for patient's symptoms could be determined.  Heart score = 3.  Did arrange for outpatient stress echo.  I believe the patient is safe and appropriate for continued outpatient management.  Recommend close follow-up with PCP and return precautions were discussed.    Diagnosis:    ICD-10-CM    1. Chest pressure  R07.89 Exercise Stress Echocardiogram       Scribe Disclosure:  I, Chuck Muro, am serving as a scribe at 6:13 PM on 12/7/2021 to document services personally performed by Sukumar Durbin DO based on my observations and the provider's statements to me.              Sukumar Dubrin DO  12/08/21 1058

## 2021-12-09 ENCOUNTER — VIRTUAL VISIT (OUTPATIENT)
Dept: FAMILY MEDICINE | Facility: CLINIC | Age: 61
End: 2021-12-09
Payer: COMMERCIAL

## 2021-12-09 ENCOUNTER — HOSPITAL ENCOUNTER (OUTPATIENT)
Dept: CARDIOLOGY | Facility: CLINIC | Age: 61
Discharge: HOME | End: 2021-12-09
Attending: EMERGENCY MEDICINE | Admitting: EMERGENCY MEDICINE
Payer: COMMERCIAL

## 2021-12-09 DIAGNOSIS — E78.5 HYPERLIPIDEMIA LDL GOAL <100: ICD-10-CM

## 2021-12-09 DIAGNOSIS — R07.9 CHEST PAIN, UNSPECIFIED TYPE: Primary | ICD-10-CM

## 2021-12-09 DIAGNOSIS — I10 ESSENTIAL HYPERTENSION: ICD-10-CM

## 2021-12-09 DIAGNOSIS — R07.89 CHEST PRESSURE: ICD-10-CM

## 2021-12-09 DIAGNOSIS — Z82.49 FAMILY HISTORY OF ISCHEMIC HEART DISEASE: ICD-10-CM

## 2021-12-09 DIAGNOSIS — I45.10 RBBB: ICD-10-CM

## 2021-12-09 LAB
ATRIAL RATE - MUSE: 67 BPM
DIASTOLIC BLOOD PRESSURE - MUSE: NORMAL MMHG
INTERPRETATION ECG - MUSE: NORMAL
P AXIS - MUSE: 61 DEGREES
PR INTERVAL - MUSE: 126 MS
QRS DURATION - MUSE: 106 MS
QT - MUSE: 444 MS
QTC - MUSE: 469 MS
R AXIS - MUSE: 33 DEGREES
SYSTOLIC BLOOD PRESSURE - MUSE: NORMAL MMHG
T AXIS - MUSE: 14 DEGREES
VENTRICULAR RATE- MUSE: 67 BPM

## 2021-12-09 PROCEDURE — 93325 DOPPLER ECHO COLOR FLOW MAPG: CPT | Mod: 26 | Performed by: INTERNAL MEDICINE

## 2021-12-09 PROCEDURE — 255N000002 HC RX 255 OP 636: Performed by: EMERGENCY MEDICINE

## 2021-12-09 PROCEDURE — 93018 CV STRESS TEST I&R ONLY: CPT | Performed by: INTERNAL MEDICINE

## 2021-12-09 PROCEDURE — 99214 OFFICE O/P EST MOD 30 MIN: CPT | Mod: 95 | Performed by: INTERNAL MEDICINE

## 2021-12-09 PROCEDURE — C8928 TTE W OR W/O FOL W/CON,STRES: HCPCS

## 2021-12-09 PROCEDURE — 93321 DOPPLER ECHO F-UP/LMTD STD: CPT | Mod: 26 | Performed by: INTERNAL MEDICINE

## 2021-12-09 PROCEDURE — 93321 DOPPLER ECHO F-UP/LMTD STD: CPT | Mod: TC

## 2021-12-09 PROCEDURE — 93350 STRESS TTE ONLY: CPT | Mod: 26 | Performed by: INTERNAL MEDICINE

## 2021-12-09 PROCEDURE — 93016 CV STRESS TEST SUPVJ ONLY: CPT | Performed by: INTERNAL MEDICINE

## 2021-12-09 RX ADMIN — HUMAN ALBUMIN MICROSPHERES AND PERFLUTREN 3 ML: 10; .22 INJECTION, SOLUTION INTRAVENOUS at 14:25

## 2021-12-09 NOTE — PROGRESS NOTES
Claude is a 61 year old who is being evaluated via a billable telephone visit.      What phone number would you like to be contacted at?   How would you like to obtain your AVS? Eryn Franco   Claude is a 61 year old who presents for the following health issues    HPI     Chief Complaint:     Post ER discharge follow up of chest pain evaluation    HPI:   Patient Claude Hayes is a very pleasant 61 year old male with history of hyperlipidemia, family history of CAD today for telephone visit for post ER discharge follow up of chest pain evaluation. Regarding the patient's chest pain symptoms, the patient was ruled out for ACS with negative troponin at the ER visit. the patient completed echo stress test today with no signs of acute ischemia. Patient is interested a cardiology clinic referral evaluation going forward.          Current Medications:     Current Outpatient Medications   Medication Sig Dispense Refill     lisinopril (PRINIVIL) 10 MG tablet Take 1 tablet (10 mg) by mouth daily Please schedule office visit prior to next refill: [phone] 90 tablet 3     rosuvastatin (CRESTOR) 20 MG tablet Take 1 tablet (20 mg) by mouth daily 90 tablet 3         Allergies:      Allergies   Allergen Reactions     Seasonal Allergies             Past Medical History:     Past Medical History:   Diagnosis Date     Diverticulitis 2018    hosp fsd 2018     Elevated cholesterol      Heart disease      Hypertension          Past Surgical History:     Past Surgical History:   Procedure Laterality Date     COLONOSCOPY       TONSILLECTOMY  1966         Family Medical History:     Family History   Problem Relation Age of Onset     Other - See Comments Mother         Old age,  age 98     Thyroid Disease Mother      Heart Disease Father         CABG age 54     Coronary Artery Disease Father      Hypertension Father      Hyperlipidemia Father      Substance Abuse Sister          Social History:     Social History      Socioeconomic History     Marital status:      Spouse name: Not on file     Number of children: 2     Years of education: Not on file     Highest education level: Not on file   Occupational History     Occupation: [employer]    Tobacco Use     Smoking status: Never Smoker     Smokeless tobacco: Never Used   Substance and Sexual Activity     Alcohol use: Yes     Comment: 1 drink a week     Drug use: No     Sexual activity: Yes     Partners: Female   Other Topics Concern     Parent/sibling w/ CABG, MI or angioplasty before 65F 55M? Yes     Comment: Father   Social History Narrative    , 2 children, works as a [employer]. Was working out regularly up to 3 months ago.  (last updated 3/5/2018)      Social Determinants of Health     Financial Resource Strain: Not on file   Food Insecurity: Not on file   Transportation Needs: Not on file   Physical Activity: Not on file   Stress: Not on file   Social Connections: Not on file   Intimate Partner Violence: Not on file   Housing Stability: Not on file           Review of System:     Constitutional: Negative for fever or chills  Skin: Negative for rashes  Ears/Nose/Throat: Negative for nasal congestion, sore throat  Respiratory: No shortness of breath, dyspnea on exertion, cough, or hemoptysis  Cardiovascular: Negative for acute chest pain at this time. positive for recent ER visit for chest pains.  Gastrointestinal: Negative for nausea, vomiting  Genitourinary: Negative for dysuria, hematuria  Musculoskeletal: Negative for myalgias  Neurologic: Negative for headaches  Psychiatric: Negative for depression, anxiety  Hematologic/Lymphatic/Immunologic: Negative  Endocrine: Negative  Behavioral: Negative for tobacco use       Physical Exam:   There were no vitals taken for this visit.    RESP: no cough over the phone   NEURO: Alert & Oriented x 3.   PSYCH: mentation appears normal, affect normal        Diagnostic Test Results:     Diagnostic Test  Results:  Labs reviewed in [facility]  Echocardiography Laboratory  [address]  [place] [address]     Name: LATASHA JENNINGS  MRN: [id number]  : 1960  Study Date: 2021 02:14 PM  Age: 61 yrs  Gender: Male  Patient Location: [facility]  Reason For Study: Chest pressure  Ordering Physician: ANNABEL VANN  Referring Physician: JOSE JUAN FLOYD  Performed By: Nara Chan RDCS     BSA: 2.1 m2  Height: 70 in  Weight: 198 lb  HR: 88  BP: 130/88 mmHg  ______________________________________________________________________________  Procedure  Stress Echo Complete. Contrast Optison.  ______________________________________________________________________________  Interpretation Summary  Baseline ECG incomplete RBBB, With exercise went into complete RBBB (rate  related)  There was no chest pain or significant ST changes with exercise.  This was a normal stress echocardiogram with no evidence of stress-induced  ischemia.  Normal resting wall motion and no stress-induced wall motion abnormality.  ______________________________________________________________________________  Stress  The patient exercised 10:50.  RPP 11153.  There was a normal BP response to exercise.  This study was stopped as the patient achieved an adequate exercise effort for  a diagnostic study.  The patient exhibited no chest pain during exercise.  The Duke treadmill score was low risk ( >5 Rodriguez score).  Target Heart Rate was achieved.  No arrhythmia noted.  There was no chest pain or significant ST changes with exercise.  This was a normal stress echocardiogram with no evidence of stress-induced  ischemia.  The visual ejection fraction is 60-65%.  Normal resting wall motion and no stress-induced wall motion abnormality.     Baseline  The patient is in normal sinus rhythm.  Baseline ECG incomplete RBBB, With exercise went into complete RBBB (rate  related).  Normal left ventricular function and  wall motion at rest.     Stress Results             Protocol:  Juan David          Maximum Predicted HR:   158 bpm             Target HR: 134 bpm        % Maximum Predicted HR: 103 %                    Stage  DurationHeart Rate  BP        Comment                         (mm:ss)   (bpm)                Stage 1   3:00      112   138/88                Stage 2   3:00      123   152/86                Stage 3   3:00      136   160/84                Stage 4   1:50      162   182/84               RecoveryR  9:00      99    122/88FAC: Above Average            Stress Duration:   10:50 mm:ss *        Recovery Time: 9:00 mm:ss         Maximum Stress HR: 162 bpm *            METS:          13     Tricuspid Valve  Doppler findings do not suggest pulmonary hypertension.     Aortic Valve  No hemodynamically significant valvular aortic stenosis.  ______________________________________________________________________________  Doppler Measurements & Calculations  TR max arias: 228.0 cm/sec  TR max P.8 mmHg     ______________________________________________________________________________  Report approved by: Neptali Munroe 2021 03:48 PM    ASSESSMENT/PLAN:       Claude was seen today for recheck.    Diagnoses and all orders for this visit:    RBBB  Family history of ischemic heart disease  Chest pain, unspecified type  -     Adult Cardiology Eval Referral; Future    Hyperlipidemia LDL goal <100  - continue Crestor     Hypertension  - continue Lisinopril      Follow Up Plan:     Patient is instructed to return to Internal Medicine clinic for follow-up visit in 1 month.      Phone call duration: 30 minutes      Fabiana Hernandez MD  Internal Medicine  [facility]

## 2021-12-31 DIAGNOSIS — R93.1 ELEVATED CORONARY ARTERY CALCIUM SCORE: Primary | ICD-10-CM

## 2022-07-16 ENCOUNTER — HEALTH MAINTENANCE LETTER (OUTPATIENT)
Age: 62
End: 2022-07-16

## 2022-09-18 ENCOUNTER — HEALTH MAINTENANCE LETTER (OUTPATIENT)
Age: 62
End: 2022-09-18

## 2022-11-21 ENCOUNTER — E-VISIT (OUTPATIENT)
Dept: PEDIATRICS | Facility: CLINIC | Age: 62
End: 2022-11-21
Payer: COMMERCIAL

## 2022-11-21 DIAGNOSIS — R06.02 SOB (SHORTNESS OF BREATH): Primary | ICD-10-CM

## 2022-11-21 PROCEDURE — 99207 PR NON-BILLABLE SERV PER CHARTING: CPT | Performed by: STUDENT IN AN ORGANIZED HEALTH CARE EDUCATION/TRAINING PROGRAM

## 2022-11-21 NOTE — PATIENT INSTRUCTIONS
Dear Claude Hayes,    We are sorry you are not feeling well. Based on the responses you provided, it is recommended that you be seen in-person in urgent care so we can better evaluate your symptoms. Please click here to find the nearest urgent care location to you.   You will not be charged for this Visit. Thank you for trusting us with your care.    LENA Moran CNP

## 2022-11-23 ENCOUNTER — IMMUNIZATION (OUTPATIENT)
Dept: FAMILY MEDICINE | Facility: CLINIC | Age: 62
End: 2022-11-23
Payer: COMMERCIAL

## 2022-11-23 PROCEDURE — 0124A COVID-19 VACCINE BIVALENT BOOSTER 12+ (PFIZER): CPT

## 2022-11-23 PROCEDURE — 91312 COVID-19 VACCINE BIVALENT BOOSTER 12+ (PFIZER): CPT

## 2023-02-19 DIAGNOSIS — I10 ESSENTIAL HYPERTENSION: ICD-10-CM

## 2023-02-19 DIAGNOSIS — E78.5 HYPERLIPIDEMIA LDL GOAL <100: ICD-10-CM

## 2023-02-24 RX ORDER — ROSUVASTATIN CALCIUM 20 MG/1
TABLET, COATED ORAL
Qty: 90 TABLET | Refills: 0 | Status: SHIPPED | OUTPATIENT
Start: 2023-02-24 | End: 2023-04-17

## 2023-02-24 RX ORDER — LISINOPRIL 10 MG/1
TABLET ORAL
Qty: 90 TABLET | Refills: 0 | Status: SHIPPED | OUTPATIENT
Start: 2023-02-24 | End: 2023-04-17

## 2023-04-17 ENCOUNTER — MYC REFILL (OUTPATIENT)
Dept: FAMILY MEDICINE | Facility: CLINIC | Age: 63
End: 2023-04-17
Payer: COMMERCIAL

## 2023-04-17 DIAGNOSIS — E78.5 HYPERLIPIDEMIA LDL GOAL <100: ICD-10-CM

## 2023-04-17 DIAGNOSIS — I10 ESSENTIAL HYPERTENSION: ICD-10-CM

## 2023-04-18 RX ORDER — LISINOPRIL 10 MG/1
10 TABLET ORAL DAILY
Qty: 90 TABLET | Refills: 0 | Status: SHIPPED | OUTPATIENT
Start: 2023-04-18 | End: 2023-07-11

## 2023-04-18 RX ORDER — ROSUVASTATIN CALCIUM 20 MG/1
20 TABLET, COATED ORAL DAILY
Qty: 90 TABLET | Refills: 0 | Status: SHIPPED | OUTPATIENT
Start: 2023-04-18 | End: 2023-08-02

## 2023-07-08 DIAGNOSIS — I10 ESSENTIAL HYPERTENSION: ICD-10-CM

## 2023-07-08 RX ORDER — LISINOPRIL 10 MG/1
10 TABLET ORAL DAILY
Qty: 90 TABLET | Refills: 0 | Status: CANCELLED | OUTPATIENT
Start: 2023-07-08

## 2023-07-11 RX ORDER — LISINOPRIL 10 MG/1
TABLET ORAL
Qty: 90 TABLET | Refills: 0 | Status: SHIPPED | OUTPATIENT
Start: 2023-07-11 | End: 2023-08-02

## 2023-07-26 ASSESSMENT — ENCOUNTER SYMPTOMS
DYSURIA: 0
NERVOUS/ANXIOUS: 0
EYE PAIN: 0
SORE THROAT: 0
ABDOMINAL PAIN: 0
PALPITATIONS: 0
CHILLS: 0
DIZZINESS: 0
PARESTHESIAS: 0
WEAKNESS: 0
FREQUENCY: 0
HEMATOCHEZIA: 0
FEVER: 0
JOINT SWELLING: 0
NAUSEA: 0
CONSTIPATION: 0
DIARRHEA: 0
MYALGIAS: 0
COUGH: 0
HEADACHES: 0
HEARTBURN: 0
ARTHRALGIAS: 0
HEMATURIA: 0
SHORTNESS OF BREATH: 0

## 2023-07-30 ENCOUNTER — HEALTH MAINTENANCE LETTER (OUTPATIENT)
Age: 63
End: 2023-07-30

## 2023-08-02 ENCOUNTER — OFFICE VISIT (OUTPATIENT)
Dept: FAMILY MEDICINE | Facility: CLINIC | Age: 63
End: 2023-08-02
Payer: COMMERCIAL

## 2023-08-02 VITALS
HEIGHT: 70 IN | BODY MASS INDEX: 29.49 KG/M2 | WEIGHT: 206 LBS | RESPIRATION RATE: 18 BRPM | OXYGEN SATURATION: 96 % | TEMPERATURE: 97.8 F | DIASTOLIC BLOOD PRESSURE: 83 MMHG | SYSTOLIC BLOOD PRESSURE: 131 MMHG | HEART RATE: 74 BPM

## 2023-08-02 DIAGNOSIS — Z12.5 SCREENING FOR PROSTATE CANCER: ICD-10-CM

## 2023-08-02 DIAGNOSIS — I10 ESSENTIAL HYPERTENSION: ICD-10-CM

## 2023-08-02 DIAGNOSIS — Z13.1 SCREENING FOR DIABETES MELLITUS: ICD-10-CM

## 2023-08-02 DIAGNOSIS — E78.5 HYPERLIPIDEMIA LDL GOAL <100: ICD-10-CM

## 2023-08-02 DIAGNOSIS — Z00.00 ROUTINE HISTORY AND PHYSICAL EXAMINATION OF ADULT: Primary | ICD-10-CM

## 2023-08-02 LAB
ANION GAP SERPL CALCULATED.3IONS-SCNC: 12 MMOL/L (ref 7–15)
BUN SERPL-MCNC: 17.5 MG/DL (ref 8–23)
CALCIUM SERPL-MCNC: 9.7 MG/DL (ref 8.8–10.2)
CHLORIDE SERPL-SCNC: 104 MMOL/L (ref 98–107)
CHOLEST SERPL-MCNC: 178 MG/DL
CREAT SERPL-MCNC: 0.97 MG/DL (ref 0.67–1.17)
DEPRECATED HCO3 PLAS-SCNC: 22 MMOL/L (ref 22–29)
GFR SERPL CREATININE-BSD FRML MDRD: 88 ML/MIN/1.73M2
GLUCOSE SERPL-MCNC: 109 MG/DL (ref 70–99)
HBA1C MFR BLD: 5.8 % (ref 0–5.6)
HDLC SERPL-MCNC: 47 MG/DL
LDLC SERPL CALC-MCNC: 113 MG/DL
NONHDLC SERPL-MCNC: 131 MG/DL
POTASSIUM SERPL-SCNC: 4.7 MMOL/L (ref 3.4–5.3)
PSA SERPL DL<=0.01 NG/ML-MCNC: 2.05 NG/ML (ref 0–4.5)
SODIUM SERPL-SCNC: 138 MMOL/L (ref 136–145)
TRIGL SERPL-MCNC: 89 MG/DL

## 2023-08-02 PROCEDURE — 80048 BASIC METABOLIC PNL TOTAL CA: CPT | Performed by: INTERNAL MEDICINE

## 2023-08-02 PROCEDURE — 99396 PREV VISIT EST AGE 40-64: CPT | Performed by: INTERNAL MEDICINE

## 2023-08-02 PROCEDURE — 83036 HEMOGLOBIN GLYCOSYLATED A1C: CPT | Performed by: INTERNAL MEDICINE

## 2023-08-02 PROCEDURE — 36415 COLL VENOUS BLD VENIPUNCTURE: CPT | Performed by: INTERNAL MEDICINE

## 2023-08-02 PROCEDURE — 80061 LIPID PANEL: CPT | Performed by: INTERNAL MEDICINE

## 2023-08-02 PROCEDURE — G0103 PSA SCREENING: HCPCS | Performed by: INTERNAL MEDICINE

## 2023-08-02 RX ORDER — ROSUVASTATIN CALCIUM 20 MG/1
20 TABLET, COATED ORAL DAILY
Qty: 90 TABLET | Refills: 3 | Status: SHIPPED | OUTPATIENT
Start: 2023-08-02 | End: 2024-08-20

## 2023-08-02 RX ORDER — LISINOPRIL 10 MG/1
10 TABLET ORAL DAILY
Qty: 90 TABLET | Refills: 3 | Status: SHIPPED | OUTPATIENT
Start: 2023-08-02 | End: 2024-08-22

## 2023-08-02 ASSESSMENT — ENCOUNTER SYMPTOMS
EYE PAIN: 0
CONSTIPATION: 0
MYALGIAS: 0
WEAKNESS: 0
DIZZINESS: 0
SORE THROAT: 0
HEARTBURN: 0
JOINT SWELLING: 0
FEVER: 0
ARTHRALGIAS: 0
CHILLS: 0
PARESTHESIAS: 0
HEADACHES: 0
ABDOMINAL PAIN: 0
NAUSEA: 0
SHORTNESS OF BREATH: 0
FREQUENCY: 0
HEMATOCHEZIA: 0
DIARRHEA: 0
COUGH: 0
NERVOUS/ANXIOUS: 0
DYSURIA: 0
HEMATURIA: 0
PALPITATIONS: 0

## 2023-08-02 ASSESSMENT — PAIN SCALES - GENERAL: PAINLEVEL: NO PAIN (0)

## 2023-08-02 NOTE — PROGRESS NOTES
SUBJECTIVE:   CC: Claude is an 62 year old who presents for preventative health visit.       Healthy Habits:     Getting at least 3 servings of Calcium per day:  Yes    Bi-annual eye exam:  NO    Dental care twice a year:  Yes    Sleep apnea or symptoms of sleep apnea:  None    Diet:  Regular (no restrictions)    Frequency of exercise:  2-3 days/week    Duration of exercise:  30-45 minutes    Taking medications regularly:  Yes    Medication side effects:  None    Additional concerns today:  No      Today's PHQ-2 Score:       8/2/2023     6:51 AM   PHQ-2 ( 1999 [employer])   Q1: Little interest or pleasure in doing things 0   Q2: Feeling down, depressed or hopeless 0   PHQ-2 Score 0   Q1: Little interest or pleasure in doing things Not at all   Q2: Feeling down, depressed or hopeless Not at all   PHQ-2 Score 0             Social History     Tobacco Use    Smoking status: Never    Smokeless tobacco: Never   Substance Use Topics    Alcohol use: Yes     Comment: 1 drink a week             7/26/2023     9:38 AM   Alcohol Use   Prescreen: >3 drinks/day or >7 drinks/week? No     Last PSA:   PSA   Date Value Ref Range Status   04/30/2021 1.72 0 - 4 ug/L Final     Comment:     Assay Method:  Chemiluminescence using Siemens Vista analyzer       Reviewed orders with patient. Reviewed health maintenance and updated orders accordingly - Yes  Labs reviewed in EPIC    Reviewed and updated as needed this visit by clinical staff                  Reviewed and updated as needed this visit by Provider                 Past Medical History:   Diagnosis Date    Diverticulitis 2018    hosp fsd 2018    Elevated cholesterol     Elevated coronary artery calcium score     CT calcium score 2017 = 45 / 61st percentile    Heart disease     Hypertension         Review of Systems   Constitutional:  Negative for chills and fever.   HENT:  Negative for congestion, ear pain, hearing loss and sore throat.    Eyes:  Negative for pain and visual disturbance.  "  Respiratory:  Negative for cough and shortness of breath.    Cardiovascular:  Negative for chest pain, palpitations and peripheral edema.   Gastrointestinal:  Negative for abdominal pain, constipation, diarrhea, heartburn, hematochezia and nausea.   Genitourinary:  Negative for dysuria, frequency, genital sores, hematuria, impotence, penile discharge and urgency.   Musculoskeletal:  Negative for arthralgias, joint swelling and myalgias.   Skin:  Negative for rash.   Neurological:  Negative for dizziness, weakness, headaches and paresthesias.   Psychiatric/Behavioral:  Negative for mood changes. The patient is not nervous/anxious.        OBJECTIVE:   /83 (BP Location: Right arm, Patient Position: Sitting, Cuff Size: Adult Large)   Pulse 74   Temp 97.8  F (36.6  C) (Oral)   Resp 18   Ht 1.765 m (5' 9.5\")   Wt 93.4 kg (206 lb)   SpO2 96%   BMI 29.98 kg/m      Physical Exam  GENERAL: alert and no distress  EYES: Eyes grossly normal to inspection, conjunctivae and sclerae normal  HENT: ear canals and TM's normal  NECK: no asymmetry  RESP: lungs clear to auscultation   CV: regular rate and rhythm  ABDOMEN: soft, nontender, bowel sounds normal  MS: no gross musculoskeletal defects noted, no edema  SKIN: no suspicious lesions or rashes  NEURO: Normal strength and tone, mentation intact and speech normal  PSYCH: mentation appears normal, affect normal/bright    Diagnostic Test Results:  Labs reviewed in Epic    ASSESSMENT/PLAN:   Claude was seen today for physical.    Diagnoses and all orders for this visit:    Routine history and physical examination of adult  -     REVIEW OF HEALTH MAINTENANCE PROTOCOL ORDERS    Essential hypertension  -     lisinopril (ZESTRIL) 10 MG tablet; Take 1 tablet (10 mg) by mouth daily  -     Basic metabolic panel  (Ca, Cl, CO2, Creat, Gluc, K, Na, BUN); Future    Hyperlipidemia LDL goal <100  -     Lipid panel reflex to direct LDL Fasting; Future  -     rosuvastatin (CRESTOR) 20 MG " tablet; Take 1 tablet (20 mg) by mouth daily    Screening for diabetes mellitus  -     Hemoglobin A1c; Future    Screening for prostate cancer  -     PSA, screen; Future        Patient has been advised of split billing requirements and indicates understanding: Yes      COUNSELING:   Reviewed preventive health counseling, as reflected in patient instructions  Special attention given to:        Regular exercise       Healthy diet/nutrition        He reports that he has never smoked. He has never used smokeless tobacco.            Fabiana Hernandez MD  [facility]

## 2023-08-03 ENCOUNTER — TELEPHONE (OUTPATIENT)
Dept: FAMILY MEDICINE | Facility: CLINIC | Age: 63
End: 2023-08-03
Payer: COMMERCIAL

## 2023-08-03 NOTE — TELEPHONE ENCOUNTER
Prior Authorization Retail Medication Request    Medication/Dose: Rosuvastatin 20 mg  ICD code (if different than what is on RX):  E78.5  Previously Tried and Failed:  None  Rationale:  Patient stable on medication since 2018 with cholesterol levels well managed    Insurance Name:  [employer]  Insurance ID:  [id number]      Pharmacy Information (if different than what is on RX)  [id number]  Phone:  [phone]

## 2023-08-07 NOTE — TELEPHONE ENCOUNTER
Prior Authorization Not Needed per Insurance    Medication: Rosuvastatin 20 mg- NOT NEEDED  is covered under patients formulary plan.    Pharmacy Notified:  Yes- left a message for pharmacy.  Patient Notified:  No    Please close encounter when finished.    Thank you,  Central Prior Authorization Team  [phone]

## 2024-05-20 ENCOUNTER — OFFICE VISIT (OUTPATIENT)
Dept: FAMILY MEDICINE | Facility: CLINIC | Age: 64
End: 2024-05-20
Payer: COMMERCIAL

## 2024-05-20 VITALS
BODY MASS INDEX: 30.84 KG/M2 | SYSTOLIC BLOOD PRESSURE: 125 MMHG | TEMPERATURE: 98.1 F | RESPIRATION RATE: 12 BRPM | WEIGHT: 215.4 LBS | HEIGHT: 70 IN | OXYGEN SATURATION: 96 % | HEART RATE: 70 BPM | DIASTOLIC BLOOD PRESSURE: 76 MMHG

## 2024-05-20 DIAGNOSIS — Z76.89 ENCOUNTER TO ESTABLISH CARE: ICD-10-CM

## 2024-05-20 DIAGNOSIS — I10 PRIMARY HYPERTENSION: ICD-10-CM

## 2024-05-20 DIAGNOSIS — G89.29 CHRONIC BILATERAL LOW BACK PAIN WITHOUT SCIATICA: ICD-10-CM

## 2024-05-20 DIAGNOSIS — K57.92 DIVERTICULITIS: ICD-10-CM

## 2024-05-20 DIAGNOSIS — E78.5 HYPERLIPIDEMIA LDL GOAL <100: ICD-10-CM

## 2024-05-20 DIAGNOSIS — Z01.818 PREOPERATIVE EXAMINATION: Primary | ICD-10-CM

## 2024-05-20 DIAGNOSIS — I45.10 RIGHT BUNDLE BRANCH BLOCK: ICD-10-CM

## 2024-05-20 DIAGNOSIS — R93.1 ELEVATED CORONARY ARTERY CALCIUM SCORE: ICD-10-CM

## 2024-05-20 DIAGNOSIS — M17.12 PRIMARY OSTEOARTHRITIS OF LEFT KNEE: ICD-10-CM

## 2024-05-20 DIAGNOSIS — M54.50 CHRONIC BILATERAL LOW BACK PAIN WITHOUT SCIATICA: ICD-10-CM

## 2024-05-20 DIAGNOSIS — R94.31 NONSPECIFIC ABNORMAL ELECTROCARDIOGRAM (ECG) (EKG): ICD-10-CM

## 2024-05-20 PROBLEM — K57.20 PERFORATION OF SIGMOID COLON DUE TO DIVERTICULITIS: Status: RESOLVED | Noted: 2018-03-05 | Resolved: 2024-05-20

## 2024-05-20 PROBLEM — A41.9 SEPSIS (H): Status: RESOLVED | Noted: 2018-03-05 | Resolved: 2024-05-20

## 2024-05-20 LAB
ANION GAP SERPL CALCULATED.3IONS-SCNC: 11 MMOL/L (ref 7–15)
ATRIAL RATE - MUSE: 72 BPM
BASOPHILS # BLD AUTO: 0 10E3/UL (ref 0–0.2)
BASOPHILS NFR BLD AUTO: 1 %
BUN SERPL-MCNC: 21.4 MG/DL (ref 8–23)
CALCIUM SERPL-MCNC: 9.6 MG/DL (ref 8.8–10.2)
CHLORIDE SERPL-SCNC: 104 MMOL/L (ref 98–107)
CREAT SERPL-MCNC: 1 MG/DL (ref 0.67–1.17)
DEPRECATED HCO3 PLAS-SCNC: 24 MMOL/L (ref 22–29)
DIASTOLIC BLOOD PRESSURE - MUSE: 76 MMHG
EGFRCR SERPLBLD CKD-EPI 2021: 84 ML/MIN/1.73M2
EOSINOPHIL # BLD AUTO: 0.1 10E3/UL (ref 0–0.7)
EOSINOPHIL NFR BLD AUTO: 2 %
ERYTHROCYTE [DISTWIDTH] IN BLOOD BY AUTOMATED COUNT: 12.3 % (ref 10–15)
GLUCOSE SERPL-MCNC: 86 MG/DL (ref 70–99)
HCT VFR BLD AUTO: 49.2 % (ref 40–53)
HGB BLD-MCNC: 16.7 G/DL (ref 13.3–17.7)
IMM GRANULOCYTES # BLD: 0 10E3/UL
IMM GRANULOCYTES NFR BLD: 0 %
INTERPRETATION ECG - MUSE: NORMAL
LYMPHOCYTES # BLD AUTO: 1.8 10E3/UL (ref 0.8–5.3)
LYMPHOCYTES NFR BLD AUTO: 27 %
MCH RBC QN AUTO: 29.1 PG (ref 26.5–33)
MCHC RBC AUTO-ENTMCNC: 33.9 G/DL (ref 31.5–36.5)
MCV RBC AUTO: 86 FL (ref 78–100)
MONOCYTES # BLD AUTO: 0.7 10E3/UL (ref 0–1.3)
MONOCYTES NFR BLD AUTO: 10 %
NEUTROPHILS # BLD AUTO: 4 10E3/UL (ref 1.6–8.3)
NEUTROPHILS NFR BLD AUTO: 60 %
P AXIS - MUSE: 47 DEGREES
PLATELET # BLD AUTO: 302 10E3/UL (ref 150–450)
POTASSIUM SERPL-SCNC: 4.5 MMOL/L (ref 3.4–5.3)
PR INTERVAL - MUSE: 130 MS
QRS DURATION - MUSE: 152 MS
QT - MUSE: 438 MS
QTC - MUSE: 479 MS
R AXIS - MUSE: -10 DEGREES
RBC # BLD AUTO: 5.74 10E6/UL (ref 4.4–5.9)
SODIUM SERPL-SCNC: 139 MMOL/L (ref 135–145)
SYSTOLIC BLOOD PRESSURE - MUSE: 125 MMHG
T AXIS - MUSE: 20 DEGREES
VENTRICULAR RATE- MUSE: 72 BPM
WBC # BLD AUTO: 6.6 10E3/UL (ref 4–11)

## 2024-05-20 PROCEDURE — 93005 ELECTROCARDIOGRAM TRACING: CPT

## 2024-05-20 PROCEDURE — 80048 BASIC METABOLIC PNL TOTAL CA: CPT

## 2024-05-20 PROCEDURE — 36415 COLL VENOUS BLD VENIPUNCTURE: CPT

## 2024-05-20 PROCEDURE — 85025 COMPLETE CBC W/AUTO DIFF WBC: CPT

## 2024-05-20 PROCEDURE — 93010 ELECTROCARDIOGRAM REPORT: CPT | Performed by: STUDENT IN AN ORGANIZED HEALTH CARE EDUCATION/TRAINING PROGRAM

## 2024-05-20 PROCEDURE — 99214 OFFICE O/P EST MOD 30 MIN: CPT

## 2024-05-20 RX ORDER — IBUPROFEN 200 MG
200 TABLET ORAL EVERY 4 HOURS PRN
COMMUNITY
End: 2024-09-09

## 2024-05-20 NOTE — PATIENT INSTRUCTIONS

## 2024-05-20 NOTE — ASSESSMENT & PLAN NOTE
Managed with bi-weekly strength training. He has done physical therapy in the past with great success. No history of surgical intervention.

## 2024-05-20 NOTE — ASSESSMENT & PLAN NOTE
Maintained on rosuvastatin 20mg without concern for side effects. He is up to date on his lipid screening. We discussed that with the possible infarct, if confirmed by the nuclear medicine study it would be reasonable to maximize his statin therapy as well as initiate a baby aspirin. Will plan to follow up on results. He is aware that no changes to this medication are needed prior to his planned procedure.

## 2024-05-20 NOTE — ASSESSMENT & PLAN NOTE
Patient notes last antibiotic treatment about three years ago. He does note that he has had two hospitalizations/ER visits, one secondary to a perforation and sepsis.

## 2024-05-20 NOTE — ASSESSMENT & PLAN NOTE
Discussed that specific preoperative instruction and all postoperative guidance will come from his procedural team.  Reviewed n.p.o. guidance and antiseptic bathing today.  Reviewed medications.  Labs and diagnostics per orders.  Patient is to undergo a nuclear medicine study prior to anesthesia given the new infarct noted on his ECG today in clinic.  However, unless there is acute ischemia noted on his stress test there should be no reason why he cannot undergo anesthesia. I will plan on following up on these results and sending an updated preoperative note after this is completed.

## 2024-05-20 NOTE — PROGRESS NOTES
Preoperative Evaluation  [facility]  [address]  [facility] [address]  Phone: [phone]  Fax: [phone]  Primary Provider: Fabiana Hernandez MD  Pre-op Performing Provider: LENA Can CNP  May 20, 2024       Surgical Information  Surgery/Procedure: PARTIAL KNEE REPLACEMENT, LEFT  Surgery Location: [facility]  Surgeon: Dr. Mitchell Ayala  Surgery Date: 6/3/24  Time of Surgery: TBD  Where patient plans to recover: At home with family  Fax number for surgical facility: [phone]    Assessment & Plan     The proposed surgical procedure is considered INTERMEDIATE risk.    Problem List Items Addressed This Visit       Diverticulitis     Patient notes last antibiotic treatment about three years ago. He does note that he has had two hospitalizations/ER visits, one secondary to a perforation and sepsis.         Hypertension     Blood pressure today in clinic is 125/76, indicating excellent control on his current regimen of lisinopril 10mg daily. Will update BMP today. He is aware of the recommendation to hold his lisinopril on the morning of his planned procedure.         Relevant Orders    Basic metabolic panel  (Ca, Cl, CO2, Creat, Gluc, K, Na, BUN)    Elevated coronary artery calcium score    Relevant Orders    NM Lexiscan stress test    Hyperlipidemia LDL goal <100     Maintained on rosuvastatin 20mg without concern for side effects. He is up to date on his lipid screening. We discussed that with the possible infarct, if confirmed by the nuclear medicine study it would be reasonable to maximize his statin therapy as well as initiate a baby aspirin. Will plan to follow up on results. He is aware that no changes to this medication are needed prior to his planned procedure.          Chronic bilateral low back pain without sciatica     Managed with bi-weekly strength training. He has done physical therapy in the past with great success.  No history of surgical intervention.         Relevant Medications    ibuprofen (ADVIL/MOTRIN) 200 MG tablet    Primary osteoarthritis of left knee     Indication for planned procedure. Patient notes acute exacerbation of symptoms a little over a year ago. Symptoms progressively worsened over the last year; was eventually quite limited in his physical activity. He has exhausted conservative care including steroid injections and physical therapy without significant improvement in symptoms hence is now pursuing surgical intervention.         Relevant Medications    ibuprofen (ADVIL/MOTRIN) 200 MG tablet    Preoperative examination - Primary     Discussed that specific preoperative instruction and all postoperative guidance will come from his procedural team.  Reviewed n.p.o. guidance and antiseptic bathing today.  Reviewed medications.  Labs and diagnostics per orders.  Patient is to undergo a nuclear medicine study prior to anesthesia given the new infarct noted on his ECG today in clinic.  However, unless there is acute ischemia noted on his stress test there should be no reason why he cannot undergo anesthesia. I will plan on following up on these results and sending an updated preoperative note after this is completed.         Relevant Orders    EKG 12-lead, tracing only (Completed)    CBC with platelets    Right bundle branch block    Nonspecific abnormal electrocardiogram (ECG) (EKG)     ECG completed today in clinic as part of his preoperative examination shows right bundle branch block (which was noted in 2021) and a new anterior infarct that was not noted in 2021.  We discussed these findings today.  He did have a stress test done in 2021, but given the new findings I think it is reasonable to repeat this today.  Order placed and he was assisted in scheduling for later this week.  If this confirms the presence of an old area of infarct, would recommend maximizing his lipid medication as well as initiating a  daily baby aspirin.  He does have a strong family history of cardiac disease per his report and his electronic health record.  These results will be finalized prior to his planned procedure, but in the absence of acute ischemia they should not impact his upcoming surgery.         Relevant Orders    NM Lexiscan stress test        - No identified additional risk factors other than previously addressed    Preoperative Medication Instructions  Antiplatelet or Anticoagulation Medication Instructions   - Patient is on no antiplatelet or anticoagulation medications.    Additional Medication Instructions  Take all scheduled medications on the day of surgery EXCEPT for modifications listed below:   - ACE/ARB: DO NOT TAKE on day of surgery (minimum 11 hours for general anesthesia).   - ibuprofen (Advil, Motrin): DO NOT TAKE 1 day before surgery.     Recommendation  Approval given to proceed with proposed procedure pending review of diagnostic evaluation. Stress test scheduled for 5/24/2024.     ADDENDUM 5/24/2024: Patient had stress testing completed with showed no evidence of old or active infarction. No contraindications to his upcoming procedure and no additional management is indicated.     Salvador Arce is a 63 year old, presenting for the following:  Pre-Op Exam (6/3/24; [facility], [place]; Fax:  [phone]; Dr. Mitchell Ayala; PARTIAL KNEE REPLACEMENT, LEFT)          5/20/2024     9:32 AM   Additional Questions   Roomed by SAIMA Martinez   Accompanied by Self         5/20/2024     9:32 AM   Patient Reported Additional Medications   Patient reports taking the following new medications N/A     HPI related to upcoming procedure: Acute exacerbation of left knee symptoms a little over a year ago. Symptoms progressively worsened over the last year; was eventually quite limited in his physical activity. He has exhausted conservative care including steroid injections and physical therapy without significant  improvement in symptoms hence is now pursuing surgical intervention.        5/13/2024   Pre-Op Questionnaire   Have you ever had a heart attack or stroke? No   Have you ever had surgery on your heart or blood vessels, such as a stent placement, a coronary artery bypass, or surgery on an artery in your head, neck, heart, or legs? No   Do you have chest pain with activity? No   Do you have a history of heart failure? No   Do you currently have a cold, bronchitis or symptoms of other infection? No   Do you have a cough, shortness of breath, or wheezing? No   Do you or anyone in your family have previous history of blood clots? No   Do you or does anyone in your family have a serious bleeding problem such as prolonged bleeding following surgeries or cuts? No   Have you ever had problems with anemia or been told to take iron pills? No   Have you had any abnormal blood loss such as black, tarry or bloody stools? No   Have you ever had a blood transfusion? No   Are you willing to have a blood transfusion if it is medically needed before, during, or after your surgery? Yes   Have you or any of your relatives ever had problems with anesthesia? No   Do you have sleep apnea, excessive snoring or daytime drowsiness? No   Do you have any artifical heart valves or other implanted medical devices like a pacemaker, defibrillator, or continuous glucose monitor? No   Do you have artificial joints? No   Are you allergic to latex? No     Health Care Directive  Patient does not have a Health Care Directive or Living Will: Discussed advance care planning with patient; however, patient declined at this time.    Preoperative Review of    reviewed - no record of controlled substances prescribed.      Patient Active Problem List    Diagnosis Date Noted    Chronic bilateral low back pain without sciatica 08/21/2021     Priority: Medium    Lumbago 04/13/2018     Priority: Medium    Bilateral foot pain 04/13/2018     Priority: Medium     "Hyperlipidemia LDL goal <100 04/11/2018     Priority: Medium    Perforation of sigmoid diverticulitis 03/05/2018     Priority: Medium    Sepsis (WBC 15.0, Temp 101.0) 03/05/2018     Priority: Medium    Diverticulitis 03/05/2018     Priority: Medium    Elevated coronary artery calcium score 10/13/2017     Priority: Medium     CT calcium score 2017 = 45 / 61st percentile      Family history of coronary artery disease 08/08/2017     Priority: Medium    Hypertension 06/09/2014     Priority: Medium      Past Medical History:   Diagnosis Date    Diverticulitis 2018    hosp [facility] 2018    Elevated cholesterol     Elevated coronary artery calcium score     CT calcium score 2017 = 45 / 61st percentile    Heart disease     Hypertension      Past Surgical History:   Procedure Laterality Date    COLONOSCOPY      TONSILLECTOMY  1966     Current Outpatient Medications   Medication Sig Dispense Refill    ibuprofen (ADVIL/MOTRIN) 200 MG tablet Take 200 mg by mouth every 4 hours as needed for pain      lisinopril (ZESTRIL) 10 MG tablet Take 1 tablet (10 mg) by mouth daily 90 tablet 3    rosuvastatin (CRESTOR) 20 MG tablet Take 1 tablet (20 mg) by mouth daily 90 tablet 3       Allergies   Allergen Reactions    Seasonal Allergies         Social History     Tobacco Use    Smoking status: Never     Passive exposure: Never    Smokeless tobacco: Never   Substance Use Topics    Alcohol use: Yes     Comment: 1 drink a week     History   Drug Use No         Review of Systems  Constitutional, HEENT, cardiovascular, pulmonary, gi and gu systems are negative, except as otherwise noted.    Objective    /76 (BP Location: Left arm, Patient Position: Sitting, Cuff Size: Adult Large)   Pulse 70   Temp 98.1  F (36.7  C) (Oral)   Resp 12   Ht 1.765 m (5' 9.5\")   Wt 97.7 kg (215 lb 6.4 oz)   SpO2 96%   BMI 31.35 kg/m     Estimated body mass index is 31.35 kg/m  as calculated from the following:    Height as of this encounter: 1.765 m (5' " "9.5\").    Weight as of this encounter: 97.7 kg (215 lb 6.4 oz).    Physical Exam  GENERAL: alert and no distress  EYES: Eyes grossly normal to inspection, PERRL and conjunctivae and sclerae normal  HENT: ear canals and TM's normal, nose and mouth without ulcers or lesions  NECK: no adenopathy, no asymmetry, masses, or scars  RESP: lungs clear to auscultation - no rales, rhonchi or wheezes  CV: regular rate and rhythm, normal S1 S2, no S3 or S4, no murmur, click or rub, no peripheral edema  ABDOMEN: soft, nontender, no hepatosplenomegaly, no masses and bowel sounds normal  MS: no gross musculoskeletal defects noted, no edema  SKIN: no suspicious lesions or rashes  NEURO: Normal strength and tone, mentation intact and speech normal  PSYCH: mentation appears normal, affect normal/bright    Recent Labs   Lab Test 08/02/23  0911      POTASSIUM 4.7   CR 0.97   A1C 5.8*        Diagnostics  Recent Results (from the past 48 hour(s))   EKG 12-lead, tracing only    Collection Time: 05/20/24  9:48 AM   Result Value Ref Range    Systolic Blood Pressure 125 mmHg    Diastolic Blood Pressure 76 mmHg    Ventricular Rate 72 BPM    Atrial Rate 72 BPM    DC Interval 130 ms    QRS Duration 152 ms     ms    QTc 479 ms    P Axis 47 degrees    R AXIS -10 degrees    T Axis 20 degrees    Interpretation ECG       Sinus rhythm  Right bundle branch block  Possible Inferior infarct , age undetermined  Abnormal ECG  When compared with ECG of 09-DEC-2021 14:10,  Right bundle branch block is now Present  Possible Inferior infarct is now Present  Confirmed by JYOTI NAVA MD LOC: ([id number]) on 5/20/2024 10:18:57 AM     CBC with platelets and differential    Collection Time: 05/20/24 10:32 AM   Result Value Ref Range    WBC Count 6.6 4.0 - 11.0 10e3/uL    RBC Count 5.74 4.40 - 5.90 10e6/uL    Hemoglobin 16.7 13.3 - 17.7 g/dL    Hematocrit 49.2 40.0 - 53.0 %    MCV 86 78 - 100 fL    MCH 29.1 26.5 - 33.0 pg    MCHC 33.9 31.5 - 36.5 g/dL    " RDW 12.3 10.0 - 15.0 %    Platelet Count 302 150 - 450 10e3/uL    % Neutrophils 60 %    % Lymphocytes 27 %    % Monocytes 10 %    % Eosinophils 2 %    % Basophils 1 %    % Immature Granulocytes 0 %    Absolute Neutrophils 4.0 1.6 - 8.3 10e3/uL    Absolute Lymphocytes 1.8 0.8 - 5.3 10e3/uL    Absolute Monocytes 0.7 0.0 - 1.3 10e3/uL    Absolute Eosinophils 0.1 0.0 - 0.7 10e3/uL    Absolute Basophils 0.0 0.0 - 0.2 10e3/uL    Absolute Immature Granulocytes 0.0 <=0.4 10e3/uL      Revised Cardiac Risk Index (RCRI)  The patient has the following serious cardiovascular risks for perioperative complications:   - No serious cardiac risks = 0 points     RCRI Interpretation: 0 points: Class I (very low risk - 0.4% complication rate)       Signed Electronically by: LENA Can CNP  Copy of this evaluation report is provided to requesting physician.

## 2024-05-20 NOTE — ASSESSMENT & PLAN NOTE
ECG completed today in clinic as part of his preoperative examination shows right bundle branch block (which was noted in 2021) and a new anterior infarct that was not noted in 2021.  We discussed these findings today.  He did have a stress test done in 2021, but given the new findings I think it is reasonable to repeat this today.  Order placed and he was assisted in scheduling for later this week.  If this confirms the presence of an old area of infarct, would recommend maximizing his lipid medication as well as initiating a daily baby aspirin.  He does have a strong family history of cardiac disease per his report and his electronic health record.  These results will be finalized prior to his planned procedure, but in the absence of acute ischemia they should not impact his upcoming surgery.

## 2024-05-20 NOTE — ASSESSMENT & PLAN NOTE
Indication for planned procedure. Patient notes acute exacerbation of symptoms a little over a year ago. Symptoms progressively worsened over the last year; was eventually quite limited in his physical activity. He has exhausted conservative care including steroid injections and physical therapy without significant improvement in symptoms hence is now pursuing surgical intervention.

## 2024-05-20 NOTE — ASSESSMENT & PLAN NOTE
Blood pressure today in clinic is 125/76, indicating excellent control on his current regimen of lisinopril 10mg daily. Will update BMP today. He is aware of the recommendation to hold his lisinopril on the morning of his planned procedure.

## 2024-05-23 ENCOUNTER — TELEPHONE (OUTPATIENT)
Dept: CARDIOLOGY | Facility: HOSPITAL | Age: 64
End: 2024-05-23
Payer: COMMERCIAL

## 2024-05-24 ENCOUNTER — HOSPITAL ENCOUNTER (OUTPATIENT)
Dept: NUCLEAR MEDICINE | Facility: HOSPITAL | Age: 64
Discharge: HOME | End: 2024-05-24
Payer: COMMERCIAL

## 2024-05-24 ENCOUNTER — HOSPITAL ENCOUNTER (OUTPATIENT)
Dept: CARDIOLOGY | Facility: HOSPITAL | Age: 64
Discharge: HOME | End: 2024-05-24
Payer: COMMERCIAL

## 2024-05-24 DIAGNOSIS — R94.31 NONSPECIFIC ABNORMAL ELECTROCARDIOGRAM (ECG) (EKG): ICD-10-CM

## 2024-05-24 DIAGNOSIS — R93.1 ELEVATED CORONARY ARTERY CALCIUM SCORE: ICD-10-CM

## 2024-05-24 LAB
CV BLOOD PRESSURE: 65 MMHG
CV STRESS CURRENT BP HE: NORMAL
CV STRESS CURRENT HR HE: 100
CV STRESS CURRENT HR HE: 102
CV STRESS CURRENT HR HE: 73
CV STRESS CURRENT HR HE: 73
CV STRESS CURRENT HR HE: 76
CV STRESS CURRENT HR HE: 83
CV STRESS CURRENT HR HE: 85
CV STRESS CURRENT HR HE: 86
CV STRESS CURRENT HR HE: 87
CV STRESS CURRENT HR HE: 87
CV STRESS CURRENT HR HE: 89
CV STRESS CURRENT HR HE: 89
CV STRESS CURRENT HR HE: 92
CV STRESS CURRENT HR HE: 92
CV STRESS CURRENT HR HE: 98
CV STRESS CURRENT HR HE: 99
CV STRESS DEVIATION TIME HE: NORMAL
CV STRESS ECHO PERCENT HR HE: NORMAL
CV STRESS EXERCISE STAGE HE: NORMAL
CV STRESS FINAL RESTING BP HE: NORMAL
CV STRESS FINAL RESTING HR HE: 83
CV STRESS MAX HR HE: 104
CV STRESS MAX TREADMILL GRADE HE: 0
CV STRESS MAX TREADMILL SPEED HE: 0
CV STRESS PEAK DIA BP HE: NORMAL
CV STRESS PEAK SYS BP HE: NORMAL
CV STRESS PHASE HE: NORMAL
CV STRESS PROTOCOL HE: NORMAL
CV STRESS RESTING PT POSITION HE: NORMAL
CV STRESS ST DEVIATION AMOUNT HE: NORMAL
CV STRESS ST DEVIATION ELEVATION HE: NORMAL
CV STRESS ST EVELATION AMOUNT HE: NORMAL
CV STRESS TEST TYPE HE: NORMAL
CV STRESS TOTAL STAGE TIME MIN 1 HE: NORMAL
RATE PRESSURE PRODUCT: NORMAL
STRESS ECHO BASELINE DIASTOLIC HE: 82
STRESS ECHO BASELINE HR: 73
STRESS ECHO BASELINE SYSTOLIC BP: 137
STRESS ECHO CALCULATED PERCENT HR: 67 %
STRESS ECHO LAST STRESS DIASTOLIC BP: 81
STRESS ECHO LAST STRESS HR: 92
STRESS ECHO LAST STRESS SYSTOLIC BP: 134
STRESS ECHO TARGET HR: 156

## 2024-05-24 PROCEDURE — 343N000001 HC RX 343

## 2024-05-24 PROCEDURE — 93018 CV STRESS TEST I&R ONLY: CPT | Performed by: INTERNAL MEDICINE

## 2024-05-24 PROCEDURE — 78452 HT MUSCLE IMAGE SPECT MULT: CPT

## 2024-05-24 PROCEDURE — A9500 TC99M SESTAMIBI: HCPCS

## 2024-05-24 PROCEDURE — 250N000011 HC RX IP 250 OP 636

## 2024-05-24 PROCEDURE — 93016 CV STRESS TEST SUPVJ ONLY: CPT | Performed by: INTERNAL MEDICINE

## 2024-05-24 PROCEDURE — 93017 CV STRESS TEST TRACING ONLY: CPT

## 2024-05-24 PROCEDURE — 78452 HT MUSCLE IMAGE SPECT MULT: CPT | Mod: 26 | Performed by: INTERNAL MEDICINE

## 2024-05-24 RX ORDER — REGADENOSON 0.08 MG/ML
0.4 INJECTION, SOLUTION INTRAVENOUS ONCE
Status: COMPLETED | OUTPATIENT
Start: 2024-05-24 | End: 2024-05-24

## 2024-05-24 RX ORDER — AMINOPHYLLINE 25 MG/ML
50 INJECTION, SOLUTION INTRAVENOUS
Status: DISCONTINUED | OUTPATIENT
Start: 2024-05-24 | End: 2024-05-24 | Stop reason: HOSPADM

## 2024-05-24 RX ADMIN — Medication 8.9 MILLICURIE: at 11:45

## 2024-05-24 RX ADMIN — Medication 33.1 MILLICURIE: at 12:25

## 2024-05-24 RX ADMIN — REGADENOSON 0.4 MG: 0.08 INJECTION, SOLUTION INTRAVENOUS at 12:28

## 2024-05-28 ENCOUNTER — TELEPHONE (OUTPATIENT)
Dept: FAMILY MEDICINE | Facility: CLINIC | Age: 64
End: 2024-05-28
Payer: COMMERCIAL

## 2024-05-28 NOTE — TELEPHONE ENCOUNTER
Pre-op note faxed to [facility] pre-op fax line at [phone]. Confirmation received. Fax number listed in visit notes is [facility]'s RN phone line.

## 2024-07-31 ENCOUNTER — PATIENT OUTREACH (OUTPATIENT)
Dept: CARE COORDINATION | Facility: CLINIC | Age: 64
End: 2024-07-31
Payer: COMMERCIAL

## 2024-08-14 ENCOUNTER — PATIENT OUTREACH (OUTPATIENT)
Dept: CARE COORDINATION | Facility: CLINIC | Age: 64
End: 2024-08-14
Payer: COMMERCIAL

## 2024-08-20 ENCOUNTER — TELEPHONE (OUTPATIENT)
Dept: FAMILY MEDICINE | Facility: CLINIC | Age: 64
End: 2024-08-20
Payer: COMMERCIAL

## 2024-08-20 DIAGNOSIS — E78.5 HYPERLIPIDEMIA LDL GOAL <100: ICD-10-CM

## 2024-08-20 RX ORDER — ROSUVASTATIN CALCIUM 20 MG/1
20 TABLET, COATED ORAL DAILY
Qty: 30 TABLET | Refills: 1 | Status: SHIPPED | OUTPATIENT
Start: 2024-08-20 | End: 2024-09-11

## 2024-08-20 NOTE — TELEPHONE ENCOUNTER
Due for updated labs and annual visit. Please assist in scheduling.  LENA Can CNP on 8/20/2024 at 1:03 PM

## 2024-08-21 NOTE — TELEPHONE ENCOUNTER
Left message to call back for: patient   Information to relay to patient: see provider message below and help schedule

## 2024-08-22 DIAGNOSIS — I10 ESSENTIAL HYPERTENSION: ICD-10-CM

## 2024-08-22 RX ORDER — LISINOPRIL 10 MG/1
10 TABLET ORAL DAILY
Qty: 90 TABLET | Refills: 0 | Status: SHIPPED | OUTPATIENT
Start: 2024-08-22 | End: 2024-09-09

## 2024-08-22 NOTE — TELEPHONE ENCOUNTER
Medication Request  Medication name: lisinopril (ZESTRIL) 10 MG tablet   Requested Pharmacy: [facility] [id number]  When was patient last seen for this?:  5/20/24  Patient offered appointment:  Pt is scheduled 9/9/24  Okay to leave a detailed message: yes    Patient is requesting bridge refill until 9/9/24 appt.

## 2024-08-27 ENCOUNTER — PATIENT OUTREACH (OUTPATIENT)
Dept: CARE COORDINATION | Facility: CLINIC | Age: 64
End: 2024-08-27
Payer: COMMERCIAL

## 2024-09-09 ENCOUNTER — OFFICE VISIT (OUTPATIENT)
Dept: FAMILY MEDICINE | Facility: CLINIC | Age: 64
End: 2024-09-09
Payer: COMMERCIAL

## 2024-09-09 VITALS
HEIGHT: 69 IN | OXYGEN SATURATION: 97 % | TEMPERATURE: 97.8 F | BODY MASS INDEX: 31.32 KG/M2 | RESPIRATION RATE: 16 BRPM | WEIGHT: 211.5 LBS | DIASTOLIC BLOOD PRESSURE: 86 MMHG | HEART RATE: 78 BPM | SYSTOLIC BLOOD PRESSURE: 135 MMHG

## 2024-09-09 DIAGNOSIS — E66.09 CLASS 1 OBESITY DUE TO EXCESS CALORIES WITH SERIOUS COMORBIDITY AND BODY MASS INDEX (BMI) OF 31.0 TO 31.9 IN ADULT: ICD-10-CM

## 2024-09-09 DIAGNOSIS — E66.811 CLASS 1 OBESITY DUE TO EXCESS CALORIES WITH SERIOUS COMORBIDITY AND BODY MASS INDEX (BMI) OF 31.0 TO 31.9 IN ADULT: ICD-10-CM

## 2024-09-09 DIAGNOSIS — E78.5 HYPERLIPIDEMIA LDL GOAL <100: ICD-10-CM

## 2024-09-09 DIAGNOSIS — H61.21 IMPACTED CERUMEN OF RIGHT EAR: ICD-10-CM

## 2024-09-09 DIAGNOSIS — R93.1 ELEVATED CORONARY ARTERY CALCIUM SCORE: ICD-10-CM

## 2024-09-09 DIAGNOSIS — Z12.5 SCREENING FOR PROSTATE CANCER: ICD-10-CM

## 2024-09-09 DIAGNOSIS — Z00.00 ROUTINE GENERAL MEDICAL EXAMINATION AT A HEALTH CARE FACILITY: Primary | ICD-10-CM

## 2024-09-09 DIAGNOSIS — K57.92 DIVERTICULITIS: ICD-10-CM

## 2024-09-09 DIAGNOSIS — I10 ESSENTIAL HYPERTENSION: ICD-10-CM

## 2024-09-09 PROBLEM — Z96.652 HISTORY OF LEFT KNEE REPLACEMENT: Status: ACTIVE | Noted: 2024-05-20

## 2024-09-09 PROBLEM — Z01.818 PREOPERATIVE EXAMINATION: Status: RESOLVED | Noted: 2024-05-20 | Resolved: 2024-09-09

## 2024-09-09 PROBLEM — Z01.818 PREOP GENERAL PHYSICAL EXAM: Status: RESOLVED | Noted: 2024-05-20 | Resolved: 2024-09-09

## 2024-09-09 LAB
ALBUMIN SERPL BCG-MCNC: 4.5 G/DL (ref 3.5–5.2)
ALP SERPL-CCNC: 73 U/L (ref 40–150)
ALT SERPL W P-5'-P-CCNC: 29 U/L (ref 0–70)
ANION GAP SERPL CALCULATED.3IONS-SCNC: 9 MMOL/L (ref 7–15)
AST SERPL W P-5'-P-CCNC: 40 U/L (ref 0–45)
BILIRUB SERPL-MCNC: 0.6 MG/DL
BUN SERPL-MCNC: 15 MG/DL (ref 8–23)
CALCIUM SERPL-MCNC: 9.4 MG/DL (ref 8.8–10.4)
CHLORIDE SERPL-SCNC: 104 MMOL/L (ref 98–107)
CHOLEST SERPL-MCNC: 195 MG/DL
CREAT SERPL-MCNC: 0.98 MG/DL (ref 0.67–1.17)
EGFRCR SERPLBLD CKD-EPI 2021: 86 ML/MIN/1.73M2
FASTING STATUS PATIENT QL REPORTED: YES
FASTING STATUS PATIENT QL REPORTED: YES
GLUCOSE SERPL-MCNC: 104 MG/DL (ref 70–99)
HCO3 SERPL-SCNC: 26 MMOL/L (ref 22–29)
HDLC SERPL-MCNC: 45 MG/DL
LDLC SERPL CALC-MCNC: 129 MG/DL
NONHDLC SERPL-MCNC: 150 MG/DL
POTASSIUM SERPL-SCNC: 4.7 MMOL/L (ref 3.4–5.3)
PROT SERPL-MCNC: 7.5 G/DL (ref 6.4–8.3)
PSA SERPL DL<=0.01 NG/ML-MCNC: 1.61 NG/ML (ref 0–4.5)
SODIUM SERPL-SCNC: 139 MMOL/L (ref 135–145)
TRIGL SERPL-MCNC: 105 MG/DL

## 2024-09-09 PROCEDURE — 69209 REMOVE IMPACTED EAR WAX UNI: CPT | Mod: RT

## 2024-09-09 PROCEDURE — 90715 TDAP VACCINE 7 YRS/> IM: CPT

## 2024-09-09 PROCEDURE — 80053 COMPREHEN METABOLIC PANEL: CPT

## 2024-09-09 PROCEDURE — 90673 RIV3 VACCINE NO PRESERV IM: CPT

## 2024-09-09 PROCEDURE — G0103 PSA SCREENING: HCPCS

## 2024-09-09 PROCEDURE — 90471 IMMUNIZATION ADMIN: CPT

## 2024-09-09 PROCEDURE — 99396 PREV VISIT EST AGE 40-64: CPT | Mod: 25

## 2024-09-09 PROCEDURE — 36415 COLL VENOUS BLD VENIPUNCTURE: CPT

## 2024-09-09 PROCEDURE — 99214 OFFICE O/P EST MOD 30 MIN: CPT | Mod: 25

## 2024-09-09 PROCEDURE — 80061 LIPID PANEL: CPT

## 2024-09-09 PROCEDURE — 90472 IMMUNIZATION ADMIN EACH ADD: CPT

## 2024-09-09 RX ORDER — LISINOPRIL 10 MG/1
10 TABLET ORAL DAILY
Qty: 90 TABLET | Refills: 3 | Status: SHIPPED | OUTPATIENT
Start: 2024-09-09

## 2024-09-09 NOTE — PATIENT INSTRUCTIONS
Weight loss medications:  Semaglutide (Wegovy)  Tirzepatide (Zepbound)  Liraglutide (Victoza)    Associated diagnoses:  -Class 1 Obesity due to excess calories with serious comorbidity and body mass index of 31.0-31.9 in adult (E66.09, Z68.31)  -Essential hypertension (I10)  -Hyperlipidemia (E78.5)    Patient Education   Preventive Care Advice   This is general advice given by our system to help you stay healthy. However, your care team may have specific advice just for you. Please talk to your care team about your preventive care needs.  Nutrition  Eat 5 or more servings of fruits and vegetables each day.  Try wheat bread, brown rice and whole grain pasta (instead of white bread, rice, and pasta).  Get enough calcium and vitamin D. Check the label on foods and aim for 100% of the RDA (recommended daily allowance).  Lifestyle  Exercise at least 150 minutes each week  (30 minutes a day, 5 days a week).  Do muscle strengthening activities 2 days a week. These help control your weight and prevent disease.  No smoking.  Wear sunscreen to prevent skin cancer.  Have a dental exam and cleaning every 6 months.  Yearly exams  See your health care team every year to talk about:  Any changes in your health.  Any medicines your care team has prescribed.  Preventive care, family planning, and ways to prevent chronic diseases.  Shots (vaccines)   HPV shots (up to age 26), if you've never had them before.  Hepatitis B shots (up to age 59), if you've never had them before.  COVID-19 shot: Get this shot when it's due.  Flu shot: Get a flu shot every year.  Tetanus shot: Get a tetanus shot every 10 years.  Pneumococcal, hepatitis A, and RSV shots: Ask your care team if you need these based on your risk.  Shingles shot (for age 50 and up)  General health tests  Diabetes screening:  Starting at age 35, Get screened for diabetes at least every 3 years.  If you are younger than age 35, ask your care team if you should be screened for  diabetes.  Cholesterol test: At age 39, start having a cholesterol test every 5 years, or more often if advised.  Bone density scan (DEXA): At age 50, ask your care team if you should have this scan for osteoporosis (brittle bones).  Hepatitis C: Get tested at least once in your life.  STIs (sexually transmitted infections)  Before age 24: Ask your care team if you should be screened for STIs.  After age 24: Get screened for STIs if you're at risk. You are at risk for STIs (including HIV) if:  You are sexually active with more than one person.  You don't use condoms every time.  You or a partner was diagnosed with a sexually transmitted infection.  If you are at risk for HIV, ask about PrEP medicine to prevent HIV.  Get tested for HIV at least once in your life, whether you are at risk for HIV or not.  Cancer screening tests  Cervical cancer screening: If you have a cervix, begin getting regular cervical cancer screening tests starting at age 21.  Breast cancer scan (mammogram): If you've ever had breasts, begin having regular mammograms starting at age 40. This is a scan to check for breast cancer.  Colon cancer screening: It is important to start screening for colon cancer at age 45.  Have a colonoscopy test every 10 years (or more often if you're at risk) Or, ask your provider about stool tests like a FIT test every year or Cologuard test every 3 years.  To learn more about your testing options, visit:   .  For help making a decision, visit:   https://bit.ly/xi69468.  Prostate cancer screening test: If you have a prostate, ask your care team if a prostate cancer screening test (PSA) at age 55 is right for you.  Lung cancer screening: If you are a current or former smoker ages 50 to 80, ask your care team if ongoing lung cancer screenings are right for you.  For informational purposes only. Not to replace the advice of your health care provider. Copyright   2023 [employer]. All rights reserved.  Clinically reviewed by the [facility] Transitions Program. [employer] [id number] - REV 01/24.

## 2024-09-09 NOTE — ASSESSMENT & PLAN NOTE
Blood pressure today is 135/86, indicating adequate control on current regimen of lisinopril 10 mg daily.  He tolerates this well without concern for side effects.  Updated monitoring labs today.  I would like him to return for blood pressure check in 6 months given the fact that this is somewhat borderline of a reading compared to his past office visits.  Scheduling ticket placed.

## 2024-09-09 NOTE — ASSESSMENT & PLAN NOTE
Annual exam.  PSA: Ordered  Colonoscopy: UTD  Immunizations: TDAP and influenza today. Will obtain pneumococcal 20 at a later date and consider RSV closer to 70.  Labs: Discussed and offered.  Mood: No concerns.  BMI: As documented.  STI screen: Declines  Recommend follow up in one year for annual exam or sooner if needed/indicated elsewhere.

## 2024-09-09 NOTE — ASSESSMENT & PLAN NOTE
BMI today is 31.01.  Upon chart review, this is relatively stable although his weight has been as low as 197 in 2020.  He expresses concern for his weight and the fact that he is unable to lose a significant amount of weight for sustained periods of time.  We spent time discussing some metabolic strategies including diet and exercise.  I would like him to increase his intensity of his cardiovascular exercise given his recent orthopedic surgery.  He is interested in medical weight management; we discussed that as he will be on Medicare as of tomorrow, these medications are generally not covered.  He is going to look into cash price and I have provided him with a list of these medications and their associated diagnoses.  If he is interested in pursuing after discussing with insurance, he will be assisted in scheduling an intake appointment with one of our weight loss physicians.

## 2024-09-09 NOTE — PROGRESS NOTES
Preventive Care Visit  [facility]  LENA Can CNP, Family Medicine  Sep 9, 2024      Assessment & Plan   Problem List Items Addressed This Visit       Diverticulitis     No recent flares.         Essential hypertension     Blood pressure today is 135/86, indicating adequate control on current regimen of lisinopril 10 mg daily.  He tolerates this well without concern for side effects.  Updated monitoring labs today.  I would like him to return for blood pressure check in 6 months given the fact that this is somewhat borderline of a reading compared to his past office visits.  Scheduling ticket placed.         Relevant Medications    lisinopril (ZESTRIL) 10 MG tablet    Other Relevant Orders    Comprehensive metabolic panel (BMP + Alb, Alk Phos, ALT, AST, Total. Bili, TP)    Elevated coronary artery calcium score     Stress test completed 5/2024 without evidence of infarct or other abnormalities. Continue to lipid lowering therapies as documented elsewhere.          Relevant Orders    Lipid panel reflex to direct LDL Non-fasting    Hyperlipidemia LDL goal <100     Current medications include rosuvastatin 20 mg daily which she tolerates without concern for side effects.  Updated lipid panel today.         Routine general medical examination at a health care facility - Primary     Annual exam.  PSA: Ordered  Colonoscopy: UTD  Immunizations: TDAP and influenza today. Will obtain pneumococcal 20 at a later date and consider RSV closer to 70.  Labs: Discussed and offered.  Mood: No concerns.  BMI: As documented.  STI screen: Declines  Recommend follow up in one year for annual exam or sooner if needed/indicated elsewhere.           Class 1 obesity due to excess calories with serious comorbidity and body mass index (BMI) of 31.0 to 31.9 in adult     BMI today is 31.01.  Upon chart review, this is relatively stable although his weight has been as low as 197 in 2020.  He expresses concern  "for his weight and the fact that he is unable to lose a significant amount of weight for sustained periods of time.  We spent time discussing some metabolic strategies including diet and exercise.  I would like him to increase his intensity of his cardiovascular exercise given his recent orthopedic surgery.  He is interested in medical weight management; we discussed that as he will be on Medicare as of tomorrow, these medications are generally not covered.  He is going to look into cash price and I have provided him with a list of these medications and their associated diagnoses.  If he is interested in pursuing after discussing with insurance, he will be assisted in scheduling an intake appointment with one of our weight loss physicians.          Other Visit Diagnoses       Impacted cerumen of right ear        Saline lavage completed by clinic staff. Patient tolerated procedure well. He can return for additional procedures if needed. Avoid Qtips.    Relevant Orders    [facility] REMOVAL IMPACTED CERUMEN IRRIGATION/LVG UNILAT (Completed)    Screening for prostate cancer        Relevant Orders    PSA, screen             Patient has been advised of split billing requirements and indicates understanding: Yes       BMI  Estimated body mass index is 31.01 kg/m  as calculated from the following:    Height as of this encounter: 1.759 m (5' 9.25\").    Weight as of this encounter: 95.9 kg (211 lb 8 oz).   Weight management plan: Discussed healthy diet and exercise guidelines    Counseling  Appropriate preventive services were addressed with this patient via screening, questionnaire, or discussion as appropriate for fall prevention, nutrition, physical activity, Tobacco-use cessation, social engagement, weight loss and cognition.  Checklist reviewing preventive services available has been given to the patient.  Reviewed patient's diet, addressing concerns and/or questions.   The patient was instructed to see the dentist every 6 " months.   He is at risk for psychosocial distress and has been provided with information to reduce risk.     Salvador   Claude is a 63 year old, presenting for the following:  Physical        5/20/2024     9:32 AM   Additional Questions   Roomed by SAIMA Martinez   Accompanied by Self        Health Care Directive  Patient does not have a Health Care Directive or Living Will: Patient states has Advance Directive and will bring in a copy to clinic.    Patient is an extremely pleasant 63-year-old presenting today for his annual physical.  He has no acute concerns as a pertains to his health, but is in need of a medication check for both his rosuvastatin on his lisinopril.  He is recovering well from a recent total knee replacement is happy with this progress.  He does note some concerns as a pertains to his weight and wonders what his options are.          9/8/2024   General Health   How would you rate your overall physical health? Good   Feel stress (tense, anxious, or unable to sleep) Only a little      (!) STRESS CONCERN      9/8/2024   Nutrition   Three or more servings of calcium each day? Yes   Diet: I don't know   How many servings of fruit and vegetables per day? (!) 2-3   How many sweetened beverages each day? 0-1      Focus on protein. Three meals a day. Minimal processed foods.         9/8/2024   Exercise   Days per week of moderate/strenous exercise 4 days   Average minutes spent exercising at this level 30 min      Strength training.         9/8/2024   Social Factors   Frequency of gathering with friends or relatives Twice a week   Worry food won't last until get money to buy more No   Food not last or not have enough money for food? No   Do you have housing? (Housing is defined as stable permanent housing and does not include staying ouside in a car, in a tent, in an abandoned building, in an overnight shelter, or couch-surfing.) Yes   Are you worried about losing your housing? No   Lack of transportation? No    Unable to get utilities (heat,electricity)? No            9/8/2024   Fall Risk   Fallen 2 or more times in the past year? No   Trouble with walking or balance? No             9/8/2024   Dental   Dentist two times every year? (!) NO            9/8/2024   TB Screening   Were you born outside of the [place]? No              Today's PHQ-2 Score:       5/20/2024     8:00 AM   PHQ-2 ( 1999 [employer])   Q1: Little interest or pleasure in doing things 0   Q2: Feeling down, depressed or hopeless 0   PHQ-2 Score 0   Q1: Little interest or pleasure in doing things Not at all   Q2: Feeling down, depressed or hopeless Not at all   PHQ-2 Score 0         9/8/2024   Substance Use   Alcohol more than 3/day or more than 7/wk No   Do you use any other substances recreationally? No        Social History     Tobacco Use    Smoking status: Never     Passive exposure: Never    Smokeless tobacco: Never   Vaping Use    Vaping status: Never Used   Substance Use Topics    Alcohol use: Yes     Comment: 1 drink a week    Drug use: No             9/8/2024   One time HIV Screening   Previous HIV test? No          9/8/2024   STI Screening   New sexual partner(s) since last STI/HIV test? No      Last PSA:   PSA   Date Value Ref Range Status   04/30/2021 1.72 0 - 4 ug/L Final     Comment:     Assay Method:  Chemiluminescence using Siemens Vista analyzer     Prostate Specific Antigen Screen   Date Value Ref Range Status   08/02/2023 2.05 0.00 - 4.50 ng/mL Final     ASCVD Risk   The 10-year ASCVD risk score (Mook CANTRELL, et al., 2019) is: 14.3%    Values used to calculate the score:      Age: 63 years      Sex: Male      Is Non- : No      Diabetic: No      Tobacco smoker: No      Systolic Blood Pressure: 135 mmHg      Is BP treated: Yes      HDL Cholesterol: 47 mg/dL      Total Cholesterol: 178 mg/dL       Reviewed and updated as needed this visit by Provider   Tobacco  Allergies  Meds  Problems  Med Hx  Surg Hx  Fam Hx  "            Objective    Exam  /86 (BP Location: Left arm, Patient Position: Left side, Cuff Size: Adult Large)   Pulse 78   Temp 97.8  F (36.6  C) (Oral)   Resp 16   Ht 1.759 m (5' 9.25\")   Wt 95.9 kg (211 lb 8 oz)   SpO2 97%   BMI 31.01 kg/m     Estimated body mass index is 31.01 kg/m  as calculated from the following:    Height as of this encounter: 1.759 m (5' 9.25\").    Weight as of this encounter: 95.9 kg (211 lb 8 oz).    Physical Exam  GENERAL: alert and no distress  EYES: Eyes grossly normal to inspection, PERRL and conjunctivae and sclerae normal  HENT: ear canals and TM's normal, nose and mouth without ulcers or lesions  NECK: no adenopathy, no asymmetry, masses, or scars  RESP: lungs clear to auscultation - no rales, rhonchi or wheezes  CV: regular rate and rhythm, normal S1 S2, no S3 or S4, no murmur, click or rub, no peripheral edema  ABDOMEN: soft, nontender, no hepatosplenomegaly, no masses and bowel sounds normal  MS: no gross musculoskeletal defects noted, no edema  SKIN: no suspicious lesions or rashes  NEURO: Normal strength and tone, mentation intact and speech normal  PSYCH: mentation appears normal, affect normal/bright    Signed Electronically by: LENA Can CNP    "

## 2024-09-09 NOTE — ASSESSMENT & PLAN NOTE
Stress test completed 5/2024 without evidence of infarct or other abnormalities. Continue to lipid lowering therapies as documented elsewhere.

## 2024-09-09 NOTE — ASSESSMENT & PLAN NOTE
Current medications include rosuvastatin 20 mg daily which she tolerates without concern for side effects.  Updated lipid panel today.

## 2024-09-10 ENCOUNTER — MYC MEDICAL ADVICE (OUTPATIENT)
Dept: FAMILY MEDICINE | Facility: CLINIC | Age: 64
End: 2024-09-10
Payer: COMMERCIAL

## 2024-09-10 DIAGNOSIS — E78.5 HYPERLIPIDEMIA LDL GOAL <100: ICD-10-CM

## 2024-09-10 DIAGNOSIS — E66.09 CLASS 1 OBESITY DUE TO EXCESS CALORIES WITH SERIOUS COMORBIDITY AND BODY MASS INDEX (BMI) OF 31.0 TO 31.9 IN ADULT: Primary | ICD-10-CM

## 2024-09-10 DIAGNOSIS — E66.811 CLASS 1 OBESITY DUE TO EXCESS CALORIES WITH SERIOUS COMORBIDITY AND BODY MASS INDEX (BMI) OF 31.0 TO 31.9 IN ADULT: Primary | ICD-10-CM

## 2024-09-11 RX ORDER — ROSUVASTATIN CALCIUM 40 MG/1
40 TABLET, COATED ORAL DAILY
Qty: 90 TABLET | Refills: 3 | Status: SHIPPED | OUTPATIENT
Start: 2024-09-11

## 2024-10-21 DIAGNOSIS — E78.5 HYPERLIPIDEMIA LDL GOAL <100: ICD-10-CM

## 2024-10-21 RX ORDER — ROSUVASTATIN CALCIUM 40 MG/1
40 TABLET, COATED ORAL DAILY
Qty: 90 TABLET | Refills: 3 | OUTPATIENT
Start: 2024-10-21

## 2024-10-21 NOTE — TELEPHONE ENCOUNTER
Medication Request  Medication name: rosuvastatin (CRESTOR) 40 MG tablet   Requested [facility]acy: [facility]  When was patient last seen for this?:  09/09/2024  Patient offered appointment:  No  Okay to leave a detailed message: no

## 2024-10-23 ENCOUNTER — OFFICE VISIT (OUTPATIENT)
Dept: FAMILY MEDICINE | Facility: CLINIC | Age: 64
End: 2024-10-23
Payer: COMMERCIAL

## 2024-10-23 VITALS
TEMPERATURE: 97.8 F | WEIGHT: 213 LBS | DIASTOLIC BLOOD PRESSURE: 93 MMHG | HEART RATE: 64 BPM | BODY MASS INDEX: 30.49 KG/M2 | OXYGEN SATURATION: 96 % | RESPIRATION RATE: 16 BRPM | SYSTOLIC BLOOD PRESSURE: 146 MMHG | HEIGHT: 70 IN

## 2024-10-23 DIAGNOSIS — E66.09 CLASS 1 OBESITY DUE TO EXCESS CALORIES WITH SERIOUS COMORBIDITY AND BODY MASS INDEX (BMI) OF 30.0 TO 30.9 IN ADULT: Primary | ICD-10-CM

## 2024-10-23 DIAGNOSIS — R06.83 SNORES: ICD-10-CM

## 2024-10-23 DIAGNOSIS — I10 ESSENTIAL HYPERTENSION: ICD-10-CM

## 2024-10-23 DIAGNOSIS — E78.5 HYPERLIPIDEMIA LDL GOAL <100: ICD-10-CM

## 2024-10-23 DIAGNOSIS — E66.811 CLASS 1 OBESITY DUE TO EXCESS CALORIES WITH SERIOUS COMORBIDITY AND BODY MASS INDEX (BMI) OF 30.0 TO 30.9 IN ADULT: Primary | ICD-10-CM

## 2024-10-23 PROCEDURE — 99215 OFFICE O/P EST HI 40 MIN: CPT | Performed by: FAMILY MEDICINE

## 2024-10-23 PROCEDURE — G2211 COMPLEX E/M VISIT ADD ON: HCPCS | Performed by: FAMILY MEDICINE

## 2024-10-23 NOTE — ASSESSMENT & PLAN NOTE
64 year old year old male in clinic today to discuss treatment of the following conditions through diet and lifestyle modification and weight loss:  1. Class 1 obesity due to excess calories with serious comorbidity and body mass index (BMI) of 30.0 to 30.9 in adult    2. Essential hypertension    3. Hyperlipidemia LDL goal <100    4. Snores      Intake: 64-year-old man presenting for discussion of metabolic health and weight.  He loosely meets criteria for metabolic syndrome.  He is physically active on a regular basis (with some limitations as result of her recent knee surgery).  Despite his efforts to eat in a constrained way, sleep well, reduce stress and be physically active he has experienced weight gain over the past few years and has not been able to lose weight.  We had a lengthy discussion about the nature of prediabetes and metabolic syndrome.  I think the optimal diet for him is a protein rich, vegetable rich and contains fruits.  He should limit carbohydrates.  He already rarely drinks calories.  Given the number of changes that he has implemented without reaching his goals a GLP-1 receptor agonist medication is indicated.  No contraindication such as family history of medullary thyroid cancer, personal history of medullary thyroid cancer nor a personal history of pancreatitis.  We discussed options.  We reviewed timing of the medication and side effects.  I am concerned constipation may be something that he struggles to manage.  We will proceed with tirzepatide.  Tentatively, we will plan to titrate to 5 mg.  If we need to we will go up to 10 mg.  He understands that this medication is not covered by Medicare.  I would like to see him back in clinic in approximately 10-12 weeks to review progress.  His goal is 100 to 120 g of protein per day and multiple servings of vegetables/hopefully.

## 2024-10-23 NOTE — PROGRESS NOTES
"    New Medical Weight Management Consult    PATIENT:  Claude Hayes  MRN:         [id number]  :         1960  LATOYA:         10/23/2024    Dear Rosio Kee DNP,    I had the pleasure of seeing your patient, Claude Hayes. Full intake/assessment was done to determine barriers to weight loss success and develop a treatment plan. Claude Hayes is a 64 year old male interested in treatment of medical problems associated with excess weight. He has a height of Data Unavailable, a weight of 0 lbs 0 oz, and the calculated There is no height or weight on file to calculate BMI.    ASSESSMENT/PLAN:  No problem-specific Assessment & Plan notes found for this encounter.    FOLLOW-UP:   12 weeks.    SUBJECTIVE:     He has the following co-morbidities:        10/22/2024     7:30 PM   --   I have the following health issues associated with obesity Pre-Diabetes    High Blood Pressure    High Cholesterol   I have the following symptoms associated with obesity Knee Pain    Fatigue     Narrative History:    - lean shana in high school.  Runner throughout 30s and 40s.     - now doing weight training.   Machine based.   - eating more protein as part of his lifting program.   - partial knee replacement this past year.  \"I have really slowed down.\" Limited cardiovascular exercise.         10/22/2024     7:30 PM   Referring Provider   Please name the provider who referred you to Medical Weight Management  If you do not know, please answer \"I Don't Know\" [place]         10/22/2024     7:30 PM   Weight History   How concerned are you about your weight? Very Concerned   I became overweight As an Adult   The following factors have contributed to my weight gain A Health Crisis    Eating Too Much   I have tried the following methods to lose weight Watching Portions or Calories    Exercise   My lowest weight since age 17 was 145   My highest weight since age 17 was 216   The most weight I have ever lost was (lbs) 18   I " have the following family history of obesity/being overweight One or more of my siblings are overweight   How has your weight changed over the last year? Gained   How many pounds? 15         10/22/2024     7:30 PM   Diet Recall Review with Patient   If you do eat breakfast, what types of food do you eat? Cereal eggs protein shake whole grain bread   If you do eat lunch, what types of food do you typically eat? Nothing typical - leftovers or sandwhich   If you do eat supper, what types of food do you typically eat? Protein and veggies   If you do snack, what types of food do you typically eat? Nuts chips cheese protein bar   How many glasses of juice do you drink in a typical day? 0   How many of glasses of milk do you drink in a typical day? 1   If you do drink milk, what type? 2%   How many 8oz glasses of sugar containing drinks such as Sammy-Aid/sweet tea do you drink in a day? 0   How many cans/bottles of sugar pop/soda/tea/sports drinks do you drink in a day? 0   How many cans/bottles of diet pop/soda/tea or sports drink do you drink in a day? 0   How often do you have a drink of alcohol? 2-3 TImes a Week   If you do drink, how many drinks might you have in a day? 1 or 2         10/22/2024     7:30 PM   Eating Habits   Generally, my meals include foods like these bread, pasta, rice, potatoes, corn, crackers, sweet dessert, pop, or juice A Few Times a Week   Generally, my meals include foods like these fried meats, brats, burgers, french fries, pizza, cheese, chips, or ice cream A Few Times a Week   Eat fast food (like McDonalds, Burger Don, Taco Bell) Less Than Weekly   Eat at a buffet or sit-down restaurant Never   Eat most of my meals in front of the TV or computer A Few Times a Week   Often skip meals, eat at random times, have no regular eating times Once a Week   Rarely sit down for a meal but snack or graze throughout Never   Eat extra snacks between meals Almost Everyday   Eat most of my food at the end  of the day A Few Times a Week   Eat in the middle of the night or wake up at night to eat Never   Eat extra snacks to prevent or correct low blood sugar Never   Eat to prevent acid reflux or stomach pain Never   Worry about not having enough food to eat Never   I eat when I am depressed Never   I eat when I am stressed Less Than Weekly   I eat when I am bored A Few Times a Week   I eat when I am anxious A Few Times a Week   I eat when I am happy or as a reward A Few Times a Week   I feel hungry all the time even if I just have eaten Less Than Weekly   Feeling full is important to me Everyday   I finish all the food on my plate even if I am already full Everyday   I can't resist eating delicious food or walk past the good food/smell A Few Times a Week   I eat/snack without noticing that I am eating A Few Times a Week   I eat when I am preparing the meal Less Than Weekly   I eat more than usual when I see others eating Less Than Weekly   I have trouble not eating sweets, ice cream, cookies, or chips if they are around the house Almost Everyday   I think about food all day Never   What foods, if any, do you crave? Chips/Crackers         10/22/2024     7:30 PM   Amount of Food   I feel out of control when eating Monthly   I eat a large amount of food, like a loaf of bread, a box of cookies, a pint/quart of ice cream, all at once Never   I eat a large amount of food even when I am not hungry Never   I eat rapidly Monthly   I eat alone because I feel embarrassed and do not want others to see how much I have eaten Never   I eat until I am uncomfortably full Never   I feel bad, disgusted, or guilty after I overeat Never         10/22/2024     7:30 PM   Activity/Exercise History   How much of a typical 12 hour day do you spend sitting? Most of the Day   How much of a typical 12 hour day do you spend lying down? Less Than Half the Day   How much of a typical day do you spend walking/standing? Less Than Half the Day   How many  hours (not including work) do you spend on the TV/Video Games/Computer/Tablet/Phone? 1 Hour or Less   How many times a week are you active for the purpose of exercise? 2-3 Times a Week   What keeps you from being more active? Pain    Other   How many total minutes do you spend doing some activity for the purpose of exercising when you exercise? More Than 30 Minutes     PAST MEDICAL HISTORY:  Past Medical History:   Diagnosis Date    Diverticulitis 2018    hosp [facility]sd 2018    Elevated cholesterol     Elevated coronary artery calcium score     CT calcium score 2017 = 45 / 61st percentile    Heart disease     Hypertension     Lumbago 04/13/2018    Perforation of sigmoid diverticulitis 03/05/2018    Sepsis (WBC 15.0, Temp 101.0) 03/05/2018         10/22/2024     7:30 PM   Work/Social History Reviewed With Patient   My employment status is Full-Time   My job is [employer]   How much of your job is spent on the computer or phone? Less Than 50%   How many hours do you spend commuting to work daily? 50 min   What is your marital status? /In a Relationship   If in a relationship, is your significant other overweight? Yes   If you have children, are they overweight? No   Who do you live with? Wife   Who does the food shopping? Both         10/22/2024     7:30 PM   Mental Health History Reviewed With Patient   Have you ever been physically or sexually abused? No   How often in the past 2 weeks have you felt little interest or pleasure in doing things? Not at all   Over the past 2 weeks how often have you felt down, depressed, or hopeless? Not at all         10/22/2024     7:30 PM   Sleep History Reviewed With Patient   How many hours do you sleep at night? 8     MEDICATIONS:   Current Outpatient Medications   Medication Sig Dispense Refill    lisinopril (ZESTRIL) 10 MG tablet Take 1 tablet (10 mg) by mouth daily. 90 tablet 3    rosuvastatin (CRESTOR) 40 MG tablet Take 1 tablet (40 mg) by mouth daily. 90 tablet 3     ALLERGIES:    Allergies   Allergen Reactions    Seasonal Allergies      PHYSICAL EXAM:  Physical Exam  Nursing note reviewed.   Constitutional:       General: He is not in acute distress.     Appearance: Normal appearance. He is not ill-appearing.   HENT:      Head: Normocephalic and atraumatic.   Eyes:      Extraocular Movements: Extraocular movements intact.      Conjunctiva/sclera: Conjunctivae normal.   Pulmonary:      Effort: Pulmonary effort is normal.   Neurological:      Mental Status: He is alert and oriented to person, place, and time.   Psychiatric:         Attention and Perception: Attention normal.         Mood and Affect: Mood normal.         Speech: Speech normal.         Thought Content: Thought content normal.       50 minutes spent on the date of the encounter doing chart review, history and exam, documentation and further activities per the note    This note has been dictated using voice recognition software. Any grammatical or context distortions are unintentional and inherent to the software    Sincerely,    Zeb Mina MD  Diplomate - American Board of Obesity Medicine  Diplomate - American Board of Family Medicine  [facility] - Comprehensive Weight Management

## 2024-10-23 NOTE — PATIENT INSTRUCTIONS
Concepts/considerations that matter in discussing metabolic health: These considerations are tough to disentangle from ONE another.    Nutrition:   - Caloric restriction  - Time restricted eating  - Nutritional restriction    Movement:  - Important for weight loss maintenance.  Rarely the main  of initial weight loss.    Sleep:  - Snoring behaviors?  - Inadequate sleep?    Distress (versus stress):  - How good is an individual at relaxing?  What is the total work load?  -A person's cortisol levels (stress hormones) can cause weight gain.    Other:  - Iatrogenic?  Medication side effects  - Genetic?  -Other?       Categories of considerations for weight gain:    Metabolic Syndrome:      Thyroid: For men and women age 39 and less we generally target 0.3-3.0.    Overeating versus under eating   -Binging behaviors?  - Other eating disorder behaviors.  Restrictive behaviors or purging behaviors?    Inflammation    100+ grams of protein per day  800 grams of veggies/fruit day   Do not drink your calories    24 week program - $499    CGM -continuous glucose monitor: Prices vary.  Freestyle rylie line generally $80-$100    Non-injectable options for weight loss:  primarily have the effect of reducing caloric intake.    Binge eating disorder: Vyvanse (AKA lisdexamfetamine).  This medication reduces impulsivity leading to binge eating behaviors.    Excess appetite: Phentermine, phentermine/topiramate, diethylpropion.  Historically these medications are the work horse of medical weight loss.  High-dose phentermine has a high risk of weight regain.  Phentermine can cause irritability, dry mouth.  These medications are pregnancy category X and we generally require patients to actively avoid pregnancy.  Principal benefit of phentermine or phentermine/topiramate is cost and ease of entry.    Reward pathway: Contrave (bupropion/naltrexone).  Both medicines work to blunt cravings.  They work by making it easier to adhere to  "overall lifestyle factors.      GLP-1 receptor agonist drug class side effects:    Bureaucracy: Cost, possibility of weight regain?    Not generally covered by Medicare (exception for some plans are for individuals with histories of heart disease).  The typical patient will likely regain weight after going off the medicine.  (Most patients who have gone off of the medicine have not gone off the medicine on his/her \"own terms\").  Think of these medicines in the 5 or 10-year time increments.    Delayed gastric emptying:  These are the side effects that most commonly result in individuals going off of these therapies.  Slowing of the digestive tract.  Food will stay in the stomach longer.  Most common side effect is nausea and queasiness which tends to improve.    Some individuals experience random throwing up.  In my opinion, the medications not worth it if this is occurring.  Constipation.      Effect on mood:  I have had a couple of patients experience dramatic changes in level of anxiety and depression.  There are case reports in the literature.  Some individuals use food as a coping mechanism.  If this coping mechanism is no longer possible given the effect of the medicine, some individuals struggle.    Physical Fragility:  Weight loss due to loss of muscle vs burning of fat.  I am concerned that individuals who take these medications long-term are at risk for excessive loss of muscle (medical term: sarcopenia).  Low muscle mass in older individuals leads to injury and decreased overall function.   Strategies to mitigate loss of muscle include dietary practices focused on protein and focusing exercise activities on strength training with functional fitness goals.     Contraindications:  Personal or family history of medullary thyroid cancer, multiple endocrine neoplasm syndrome.  There are some reports that lifetime risk of thyroid cancer increases 2-3x.  This is a rare cancer to begin with.  Personal history of " pancreatitis.    GLP1-ra options:    Wegovy (or Ozempic) aka semaglutide: cost per month retail ~$1400      Zepbound (or Mounjaro) aka tirzepatide: cost per month retail ~$1100 (there is an online coupon)

## 2024-10-31 ENCOUNTER — TELEPHONE (OUTPATIENT)
Dept: FAMILY MEDICINE | Facility: CLINIC | Age: 64
End: 2024-10-31

## 2024-10-31 NOTE — TELEPHONE ENCOUNTER
Patient Quality Outreach    Patient is due for the following:   Hypertension -  BP check    Next Steps:   No follow up needed at this time.    Type of outreach:    Sent S[employer] message.      Questions for provider review:    None           Natasha Mai MA

## 2024-11-04 ENCOUNTER — TELEPHONE (OUTPATIENT)
Dept: FAMILY MEDICINE | Facility: CLINIC | Age: 64
End: 2024-11-04
Payer: COMMERCIAL

## 2024-11-04 DIAGNOSIS — E78.5 HYPERLIPIDEMIA LDL GOAL <100: ICD-10-CM

## 2024-11-04 DIAGNOSIS — I10 ESSENTIAL HYPERTENSION: ICD-10-CM

## 2024-11-04 RX ORDER — LISINOPRIL 10 MG/1
10 TABLET ORAL DAILY
Qty: 90 TABLET | Refills: 2 | Status: SHIPPED | OUTPATIENT
Start: 2024-11-04

## 2024-11-04 RX ORDER — ROSUVASTATIN CALCIUM 40 MG/1
40 TABLET, COATED ORAL DAILY
Qty: 90 TABLET | Refills: 2 | Status: SHIPPED | OUTPATIENT
Start: 2024-11-04

## 2024-11-04 RX ORDER — ROSUVASTATIN CALCIUM 40 MG/1
40 TABLET, COATED ORAL DAILY
Qty: 90 TABLET | Refills: 3 | Status: CANCELLED | OUTPATIENT
Start: 2024-11-04

## 2024-11-04 RX ORDER — LISINOPRIL 10 MG/1
10 TABLET ORAL DAILY
Qty: 90 TABLET | Refills: 2 | Status: CANCELLED | OUTPATIENT
Start: 2024-11-04

## 2024-11-04 NOTE — TELEPHONE ENCOUNTER
"Left message to call back for:  Claude  Information to relay to patient:  Please transfer to [facility] RN to discuss lisinopril dose and request for lisinopril and rosuvastatin prescriptions be sent to [facility] (both prescriptions on file with refills, please cancel prescriptions at [facility] and resend to [facility], once lisinopril dose clarified with patient).      Per chart review of last OV with Rosio Kee CNP on 9/9/24, lisinopril was continued at 10 mg daily (new prescription sent in for a one year supply on 9/9/24 to [facility]) with follow up visit in 6 months for BP check.      Per 9/9/24 OV notes:       \"Essential hypertension        Blood pressure today is 135/86, indicating adequate control on current regimen of lisinopril 10 mg daily.  He tolerates this well without concern for side effects.  Updated monitoring labs today.  I would like him to return for blood pressure check in 6 months given the fact that this is somewhat borderline of a reading compared to his past office visits.  Scheduling ticket placed.           Relevant Medications     lisinopril (ZESTRIL) 10 MG tablet\"             Montse Ruiz RN  [facility]   "

## 2024-11-04 NOTE — TELEPHONE ENCOUNTER
Patient states at last OV it was discussed his lisinopril was going to be increased to 20 MG. Patient requesting clarification on proper dosing and for refills to be sent to [facility].       Medication Request  Medication name: lisinopril (ZESTRIL) 10 MG tablet   rosuvastatin (CRESTOR) 40 MG tablet   Requested Pharmacy: [facility]  When was patient last seen for this?:  09/09/2024  Patient offered appointment:  no  Okay to leave a detailed message: yes at [phone]

## 2024-11-06 DIAGNOSIS — R06.83 SNORES: ICD-10-CM

## 2024-11-06 DIAGNOSIS — E66.811 CLASS 1 OBESITY DUE TO EXCESS CALORIES WITH SERIOUS COMORBIDITY AND BODY MASS INDEX (BMI) OF 30.0 TO 30.9 IN ADULT: Primary | ICD-10-CM

## 2024-11-06 DIAGNOSIS — E78.5 HYPERLIPIDEMIA LDL GOAL <100: ICD-10-CM

## 2024-11-06 DIAGNOSIS — I10 ESSENTIAL HYPERTENSION: ICD-10-CM

## 2024-11-06 DIAGNOSIS — R93.1 ELEVATED CORONARY ARTERY CALCIUM SCORE: ICD-10-CM

## 2024-11-06 DIAGNOSIS — E66.09 CLASS 1 OBESITY DUE TO EXCESS CALORIES WITH SERIOUS COMORBIDITY AND BODY MASS INDEX (BMI) OF 30.0 TO 30.9 IN ADULT: Primary | ICD-10-CM

## 2024-11-06 NOTE — TELEPHONE ENCOUNTER
General Call    Contacts       Contact Date/Time Type Contact Phone/Fax    11/06/2024 03:57 PM CST Phone (Incoming) Claude Hayes (Self) [phone] (M)          Reason for Call:  Zepbound Update/Refill     What are your questions or concerns:  Patient calling to update Dr. Mina that he has taken two doses of the Zepbound 2.5mg and has tolerated well with no side effects. Patient requesting next dose.     Date of last appointment with provider: 10/23/24    Could we send this information to you in [employer] or would you prefer to receive a phone call?:   Patient would prefer a phone call   Okay to leave a detailed message?: Yes at Cell number on file:    Telephone Information:   Mobile [phone]

## 2024-11-07 NOTE — TELEPHONE ENCOUNTER
Patient returned call. Provider message relayed and patient verbalized understanding.    He will call back to schedule follow-up appointment.

## 2024-11-07 NOTE — CONFIDENTIAL NOTE
I sent in a prescription for 5 mg Zepbound to the pharmacy.  Please asked the patient to send an update in about a month with progress.  We will decide whether or not we should go up to 7.5 mg or stay at 5 mg.    When I saw him, I asked him to schedule a 10-12-week follow-up appointment.  Please help him to schedule that follow-up appointment.

## 2024-11-07 NOTE — TELEPHONE ENCOUNTER
Left message to call back for: Patient  Information to relay to patient: Please relay provider message below and assist patient with scheduling follow up.

## 2024-12-07 DIAGNOSIS — I10 ESSENTIAL HYPERTENSION: ICD-10-CM

## 2024-12-07 DIAGNOSIS — E66.811 CLASS 1 OBESITY DUE TO EXCESS CALORIES WITH SERIOUS COMORBIDITY AND BODY MASS INDEX (BMI) OF 30.0 TO 30.9 IN ADULT: ICD-10-CM

## 2024-12-07 DIAGNOSIS — R06.83 SNORES: ICD-10-CM

## 2024-12-07 DIAGNOSIS — E78.5 HYPERLIPIDEMIA LDL GOAL <100: ICD-10-CM

## 2024-12-07 DIAGNOSIS — R93.1 ELEVATED CORONARY ARTERY CALCIUM SCORE: ICD-10-CM

## 2024-12-07 DIAGNOSIS — E66.09 CLASS 1 OBESITY DUE TO EXCESS CALORIES WITH SERIOUS COMORBIDITY AND BODY MASS INDEX (BMI) OF 30.0 TO 30.9 IN ADULT: ICD-10-CM

## 2024-12-09 DIAGNOSIS — E66.811 CLASS 1 OBESITY DUE TO EXCESS CALORIES WITH SERIOUS COMORBIDITY AND BODY MASS INDEX (BMI) OF 30.0 TO 30.9 IN ADULT: ICD-10-CM

## 2024-12-09 DIAGNOSIS — R93.1 ELEVATED CORONARY ARTERY CALCIUM SCORE: ICD-10-CM

## 2024-12-09 DIAGNOSIS — E78.5 HYPERLIPIDEMIA LDL GOAL <100: ICD-10-CM

## 2024-12-09 DIAGNOSIS — I10 ESSENTIAL HYPERTENSION: ICD-10-CM

## 2024-12-09 DIAGNOSIS — E66.09 CLASS 1 OBESITY DUE TO EXCESS CALORIES WITH SERIOUS COMORBIDITY AND BODY MASS INDEX (BMI) OF 30.0 TO 30.9 IN ADULT: ICD-10-CM

## 2024-12-09 DIAGNOSIS — R06.83 SNORES: ICD-10-CM

## 2024-12-09 RX ORDER — TIRZEPATIDE 5 MG/.5ML
INJECTION, SOLUTION SUBCUTANEOUS
Qty: 2 ML | Refills: 0 | OUTPATIENT
Start: 2024-12-09

## 2024-12-14 DIAGNOSIS — E78.5 HYPERLIPIDEMIA LDL GOAL <100: ICD-10-CM

## 2024-12-14 DIAGNOSIS — I10 ESSENTIAL HYPERTENSION: ICD-10-CM

## 2024-12-14 DIAGNOSIS — E66.09 CLASS 1 OBESITY DUE TO EXCESS CALORIES WITH SERIOUS COMORBIDITY AND BODY MASS INDEX (BMI) OF 30.0 TO 30.9 IN ADULT: ICD-10-CM

## 2024-12-14 DIAGNOSIS — E66.811 CLASS 1 OBESITY DUE TO EXCESS CALORIES WITH SERIOUS COMORBIDITY AND BODY MASS INDEX (BMI) OF 30.0 TO 30.9 IN ADULT: ICD-10-CM

## 2024-12-14 DIAGNOSIS — R93.1 ELEVATED CORONARY ARTERY CALCIUM SCORE: ICD-10-CM

## 2024-12-14 DIAGNOSIS — R06.83 SNORES: ICD-10-CM

## 2024-12-16 RX ORDER — TIRZEPATIDE 7.5 MG/.5ML
INJECTION, SOLUTION SUBCUTANEOUS
Qty: 2 ML | Refills: 0 | OUTPATIENT
Start: 2024-12-16

## 2024-12-16 NOTE — TELEPHONE ENCOUNTER
Patient returning call - Patient states he was able to  this medication over the weekend with no issues. Refill request sent in error.

## 2024-12-16 NOTE — TELEPHONE ENCOUNTER
Left message to call back for: Patient  Information to relay to patient: Please see provider message below and clarify with patient. (See MyChart encounter 12/09 for additional context)

## 2024-12-16 NOTE — CONFIDENTIAL NOTE
Refill of prescription sent on 12/9.  Note sent to patient on that date with confirmation the patient read the note.    Unexpected refill request.  Is there an issue?

## 2025-01-08 ENCOUNTER — MYC MEDICAL ADVICE (OUTPATIENT)
Dept: FAMILY MEDICINE | Facility: CLINIC | Age: 65
End: 2025-01-08
Payer: COMMERCIAL

## 2025-01-08 DIAGNOSIS — E66.09 CLASS 1 OBESITY DUE TO EXCESS CALORIES WITH SERIOUS COMORBIDITY AND BODY MASS INDEX (BMI) OF 30.0 TO 30.9 IN ADULT: Primary | ICD-10-CM

## 2025-01-08 DIAGNOSIS — I10 ESSENTIAL HYPERTENSION: ICD-10-CM

## 2025-01-08 DIAGNOSIS — E66.811 CLASS 1 OBESITY DUE TO EXCESS CALORIES WITH SERIOUS COMORBIDITY AND BODY MASS INDEX (BMI) OF 30.0 TO 30.9 IN ADULT: Primary | ICD-10-CM

## 2025-01-08 DIAGNOSIS — E78.5 HYPERLIPIDEMIA LDL GOAL <100: ICD-10-CM

## 2025-01-23 ENCOUNTER — OFFICE VISIT (OUTPATIENT)
Dept: FAMILY MEDICINE | Facility: CLINIC | Age: 65
End: 2025-01-23
Attending: FAMILY MEDICINE
Payer: COMMERCIAL

## 2025-01-23 VITALS
BODY MASS INDEX: 28.52 KG/M2 | SYSTOLIC BLOOD PRESSURE: 120 MMHG | HEART RATE: 92 BPM | TEMPERATURE: 97.9 F | HEIGHT: 70 IN | OXYGEN SATURATION: 96 % | DIASTOLIC BLOOD PRESSURE: 77 MMHG | WEIGHT: 199.2 LBS | RESPIRATION RATE: 16 BRPM

## 2025-01-23 DIAGNOSIS — E66.811 CLASS 1 OBESITY DUE TO EXCESS CALORIES WITH SERIOUS COMORBIDITY AND BODY MASS INDEX (BMI) OF 30.0 TO 30.9 IN ADULT: ICD-10-CM

## 2025-01-23 DIAGNOSIS — E78.5 HYPERLIPIDEMIA LDL GOAL <100: ICD-10-CM

## 2025-01-23 DIAGNOSIS — E66.09 CLASS 1 OBESITY DUE TO EXCESS CALORIES WITH SERIOUS COMORBIDITY AND BODY MASS INDEX (BMI) OF 30.0 TO 30.9 IN ADULT: ICD-10-CM

## 2025-01-23 DIAGNOSIS — I10 ESSENTIAL HYPERTENSION: ICD-10-CM

## 2025-01-23 NOTE — PROGRESS NOTES
Assessment & Plan   Problem List Items Addressed This Visit       Class 1 obesity due to excess calories with serious comorbidity and body mass index (BMI) of 30.0 to 30.9 in adult     64 year old year old male in clinic today to discuss treatment of the following conditions through diet and lifestyle modification and weight loss:  1. Class 1 obesity due to excess calories with serious comorbidity and body mass index (BMI) of 30.0 to 30.9 in adult    2. Essential hypertension    3. Hyperlipidemia LDL goal <100      The patient's weight loss result since the last visit was successful based on weight loss.  Nutrition improved.  Blood pressure dramatically improved.  We discussed signs/symptoms of hypotension.  Anticipate he will be able to eliminate lisinopril shortly.  He is eating well.  He objectively measures increased strength despite losing weight.  I suspect that his weight loss thus far actually under appreciates the improvement in his body habitus.  For now, continue 10 mg.  Will plan to recheck in 3-6 months.  Favorable initial response.         Relevant Medications    tirzepatide-Weight Management (ZEPBOUND) 10 MG/0.5ML prefilled pen    Essential hypertension    Relevant Medications    tirzepatide-Weight Management (ZEPBOUND) 10 MG/0.5ML prefilled pen    Hyperlipidemia LDL goal <100    Relevant Medications    tirzepatide-Weight Management (ZEPBOUND) 10 MG/0.5ML prefilled pen     The longitudinal plan of care for the diagnosis(es)/condition(s) as documented were addressed during this visit. Due to the added complexity in care, I will continue to support Claude in the subsequent management and with ongoing continuity of care.    Subjective   Claude is a 64 year old, presenting for the following health issues:  Weight Loss (Follow-up )        1/23/2025    12:23 PM   Additional Questions   Roomed by sofie     Patient presents for treatment of chronic, comorbid conditions using intensive therapeutic lifestyle  "interventions including nutrition, physical activity, and behavior management.   - successes: \"I feel fabulous.\"   - struggles: constipation initially.  He has added some use of miralax (~1x/week) and fiber supplementation.     - movement: maintain strength. Energy great.    - tracking/journaling: ad trinity. \"As much protein as I can.\"  Some protein supplementation in addition to nutritional.  Eating more fruit.    - following nutritional plan: yes.  Deviations from plan: infrequent   - hunger: Stable. Improved.   - medication benefits: helpful. Side effects: anorexia, \"Feel funky.\"  For about 24 hours after injection.    Blood pressure: some orthostatic sensation.        History of Present Illness       Reason for visit:  Follow up    He eats 2-3 servings of fruits and vegetables daily.He consumes 0 sweetened beverage(s) daily.He exercises with enough effort to increase his heart rate 30 to 60 minutes per day.  He exercises with enough effort to increase his heart rate 5 days per week.   He is taking medications regularly.          Objective    /77 (BP Location: Left arm, Patient Position: Sitting, Cuff Size: Adult Large)   Pulse 92   Temp 97.9  F (36.6  C) (Oral)   Resp 16   Ht 1.772 m (5' 9.75\")   Wt 90.4 kg (199 lb 3.2 oz)   SpO2 96%   BMI 28.79 kg/m    Body mass index is 28.79 kg/m .  Physical Exam  Nursing note reviewed.   Constitutional:       General: He is not in acute distress.     Appearance: Normal appearance. He is not ill-appearing.   HENT:      Head: Normocephalic and atraumatic.   Eyes:      Extraocular Movements: Extraocular movements intact.      Conjunctiva/sclera: Conjunctivae normal.   Pulmonary:      Effort: Pulmonary effort is normal.   Neurological:      Mental Status: He is alert and oriented to person, place, and time.   Psychiatric:         Attention and Perception: Attention normal.         Mood and Affect: Mood normal.         Speech: Speech normal.         Thought Content: " Thought content normal.                  Signed Electronically by: Zeb Mina MD

## 2025-01-23 NOTE — ASSESSMENT & PLAN NOTE
64 year old year old male in clinic today to discuss treatment of the following conditions through diet and lifestyle modification and weight loss:  1. Class 1 obesity due to excess calories with serious comorbidity and body mass index (BMI) of 30.0 to 30.9 in adult    2. Essential hypertension    3. Hyperlipidemia LDL goal <100      The patient's weight loss result since the last visit was successful based on weight loss.  Nutrition improved.  Blood pressure dramatically improved.  We discussed signs/symptoms of hypotension.  Anticipate he will be able to eliminate lisinopril shortly.  He is eating well.  He objectively measures increased strength despite losing weight.  I suspect that his weight loss thus far actually under appreciates the improvement in his body habitus.  For now, continue 10 mg.  Will plan to recheck in 3-6 months.  Favorable initial response.

## 2025-01-23 NOTE — PATIENT INSTRUCTIONS
GLP1-ra options:    Wegovy (or Ozempic) aka semaglutide: cost per month retail ~$1400      Zepbound (or Mounjaro) aka tirzepatide: cost per month retail ~$1100 (there is an online coupon)      Saxenda (or Victoza) aka liraglutide: cost per month retail: ~$1400

## 2025-02-06 DIAGNOSIS — E66.811 CLASS 1 OBESITY DUE TO EXCESS CALORIES WITH SERIOUS COMORBIDITY AND BODY MASS INDEX (BMI) OF 30.0 TO 30.9 IN ADULT: ICD-10-CM

## 2025-02-06 DIAGNOSIS — I10 ESSENTIAL HYPERTENSION: ICD-10-CM

## 2025-02-06 DIAGNOSIS — E66.09 CLASS 1 OBESITY DUE TO EXCESS CALORIES WITH SERIOUS COMORBIDITY AND BODY MASS INDEX (BMI) OF 30.0 TO 30.9 IN ADULT: ICD-10-CM

## 2025-02-06 DIAGNOSIS — E78.5 HYPERLIPIDEMIA LDL GOAL <100: ICD-10-CM

## 2025-02-06 NOTE — TELEPHONE ENCOUNTER
Medication Request  Medication name: tirzepatide-Weight Management (ZEPBOUND) 10 MG/0.5ML prefilled pen   [facility]ed Pharmacy: [facility]  When was patient last seen for this?:  01/23/2025  Patient offered appointment:  no  Okay to leave a detailed message: no     Patient states that pharmacy does not have any refills on file for this medication. Patient states he will be leaving on Sunday (02/09) for 2 weeks on vacation and will need refills for this medication. Patient is hoping to  medication on Friday (02/07) or Saturday (02/08).

## 2025-02-06 NOTE — TELEPHONE ENCOUNTER
S-(situation):   Please see previous message. Patient stating no refills at pharmacy.    B-(background):   tirzepatide-Weight Management (ZEPBOUND) 10 MG/0.5ML prefilled pen  2 mL 5   Receipt confirmed by pharmacy (1/23/2025  3:48 PM CST)     A-(assessment):   RN contacted [phone] [facility] [address] [facility] [id number] [place], [place].  Pharmacy confirmed that medication RX and refills on file.    R-(recommendations):   RN called and LM for patient. Updated that RN called pharmacy and confirmed RX on file with refills.  Patient will need to CALL PHARMACY and speak with someone to fill medication.

## 2025-05-08 ENCOUNTER — OFFICE VISIT (OUTPATIENT)
Dept: FAMILY MEDICINE | Facility: CLINIC | Age: 65
End: 2025-05-08
Payer: COMMERCIAL

## 2025-05-08 VITALS
TEMPERATURE: 97.7 F | RESPIRATION RATE: 16 BRPM | HEART RATE: 69 BPM | OXYGEN SATURATION: 97 % | HEIGHT: 70 IN | WEIGHT: 193.56 LBS | SYSTOLIC BLOOD PRESSURE: 136 MMHG | DIASTOLIC BLOOD PRESSURE: 87 MMHG | BODY MASS INDEX: 27.71 KG/M2

## 2025-05-08 DIAGNOSIS — I10 ESSENTIAL HYPERTENSION: Primary | ICD-10-CM

## 2025-05-08 DIAGNOSIS — E78.5 HYPERLIPIDEMIA LDL GOAL <100: ICD-10-CM

## 2025-05-08 NOTE — ASSESSMENT & PLAN NOTE
Patient presents today in routine follow-up for his hypertension.  In the interim since our last visit, he has successfully lost over 20 pounds through his work with weight management.  As result, he began experiencing some hypotensive symptoms on his lisinopril and has actually discontinued this.  Today his blood pressure remains within goal range.  He anticipates continued weight reduction.  Will continue off medications at this time and continue to reevaluate on a regular basis.

## 2025-05-08 NOTE — ASSESSMENT & PLAN NOTE
Patient expresses some interest in discontinuing his rosuvastatin given how successful he has been with weight loss.  We discussed a brief conversation of risk versus benefits.  He is tolerating extremely well without side effects and does have a very strong family history of cardiac disease in multiple family members.  We also discussed that even if his LDL remains under good control off of medication, his ASCVD risk or would likely still indicate the need for a lipid-lowering agent (simply given his age/gender), however the degree to what it would decrease his risk would need to be calculated at that time.  At this time, he is comfortable continue with medication as it is and we will reevaluate at his physical in the fall.

## 2025-05-08 NOTE — PROGRESS NOTES
Assessment & Plan   Assessment & Plan  Essential hypertension  Patient presents today in routine follow-up for his hypertension.  In the interim since our last visit, he has successfully lost over 20 pounds through his work with weight management.  As result, he began experiencing some hypotensive symptoms on his lisinopril and has actually discontinued this.  Today his blood pressure remains within goal range.  He anticipates continued weight reduction.  Will continue off medications at this time and continue to reevaluate on a regular basis.       Hyperlipidemia LDL goal <100  Patient expresses some interest in discontinuing his rosuvastatin given how successful he has been with weight loss.  We discussed a brief conversation of risk versus benefits.  He is tolerating extremely well without side effects and does have a very strong family history of cardiac disease in multiple family members.  We also discussed that even if his LDL remains under good control off of medication, his ASCVD risk or would likely still indicate the need for a lipid-lowering agent (simply given his age/gender), however the degree to what it would decrease his risk would need to be calculated at that time.  At this time, he is comfortable continue with medication as it is and we will reevaluate at his physical in the fall.       Salvador Arce is a 64 year old, presenting for the following health issues:  Hypertension        5/8/2025     9:24 AM   Additional Questions   Roomed by sac   Accompanied by self     Blood pressure check  Stopped his lisinopril after having some dizziness symptoms. BP seems to have remained well controlled.  Goal is to lose another 10#. He wonders if he would ever be able to stop his rosuvastatin.     History of Present Illness       Reason for visit:  Annual visit   He is taking medications regularly.          Objective    /87 (BP Location: Left arm, Patient Position: Sitting, Cuff Size: Adult Large)    "Pulse 69   Temp 97.7  F (36.5  C) (Oral)   Resp 16   Ht 1.77 m (5' 9.7\")   Wt 87.8 kg (193 lb 9 oz)   SpO2 97%   BMI 28.01 kg/m    Body mass index is 28.01 kg/m .    Physical Exam  Vitals and nursing note reviewed.   Constitutional:       General: He is not in acute distress.     Appearance: Normal appearance.   Cardiovascular:      Rate and Rhythm: Normal rate and regular rhythm.   Pulmonary:      Effort: Pulmonary effort is normal. No respiratory distress.   Neurological:      Mental Status: He is alert.   Psychiatric:         Mood and Affect: Mood normal.         Behavior: Behavior normal.         Thought Content: Thought content normal.            Signed Electronically by: LENA Can CNP    "

## 2025-06-16 ENCOUNTER — MYC REFILL (OUTPATIENT)
Dept: FAMILY MEDICINE | Facility: CLINIC | Age: 65
End: 2025-06-16
Payer: COMMERCIAL

## 2025-06-16 DIAGNOSIS — E78.5 HYPERLIPIDEMIA LDL GOAL <100: ICD-10-CM

## 2025-06-16 RX ORDER — ROSUVASTATIN CALCIUM 40 MG/1
40 TABLET, COATED ORAL DAILY
Qty: 90 TABLET | Refills: 2 | Status: SHIPPED | OUTPATIENT
Start: 2025-06-16

## 2025-07-01 ENCOUNTER — TELEPHONE (OUTPATIENT)
Dept: FAMILY MEDICINE | Facility: CLINIC | Age: 65
End: 2025-07-01
Payer: COMMERCIAL

## 2025-07-01 NOTE — TELEPHONE ENCOUNTER
Prior Authorization Retail Medication Request    Medication/Dose:     tirzepatide-Weight Management (ZEPBOUND) 10 MG/0.5ML prefilled pen     Diagnosis and ICD code (if different than what is on RX):  - Class 1 obesity due to excess calories with serious comorbidity and body mass index (BMI) of 30.0 to 30.9 in adult - Essential hypertension Hyperlipidemia LDL goal <100   New/renewal/insurance change PA/secondary ins. PA:  Previously Tried and Failed:    Rationale:      Insurance   Primary:   [employer] [place]     Insurance ID:    [id number]       Pharmacy Information (if different than what is on RX)    [id number] [place], [place] - [address] AT [facility] [address] [phone]       Clinic Information  Preferred routing pool for dept communication: [facility] clinic Pool        [facility] attempted X2 to send this PA, patient needs this filled today if possible    PATIENT WOULD LIKE A CALL IF THIS IS COMPLETED TODAY

## 2025-07-01 NOTE — TELEPHONE ENCOUNTER
Prior Authorization Approval    Authorization Effective Date: 6/1/2025  Authorization Expiration Date: 2/26/2026  Medication: Zepbound-APPROVED  Reference #:     Insurance Company: [employer] ([facility]) - Phone [phone] Fax [phone]  Which Pharmacy is filling the prescription (Not needed for infusion/clinic administered): [id number] - [place], [place] - [address] AT [facility] [address]  Pharmacy Notified: Yes  Patient Notified: Instructed pharmacy to notify patient when script is ready to /ship.

## 2025-07-01 NOTE — TELEPHONE ENCOUNTER
Retail Pharmacy Prior Authorization Team   Phone: [phone]    PA Initiation    Medication: ZEPBOUND 10 MG/0.5ML SC SOAJ  Insurance Company: [employer] ([facility]) - Phone [phone] Fax [phone]  Pharmacy Filling the Rx: [id number] [address] AT [facility] OF [address]  Filling Pharmacy Phone: [phone]  Filling Pharmacy Fax:    Start Date: 7/1/2025

## 2025-07-26 DIAGNOSIS — E78.5 HYPERLIPIDEMIA LDL GOAL <100: ICD-10-CM

## 2025-07-26 DIAGNOSIS — E66.09 CLASS 1 OBESITY DUE TO EXCESS CALORIES WITH SERIOUS COMORBIDITY AND BODY MASS INDEX (BMI) OF 30.0 TO 30.9 IN ADULT: ICD-10-CM

## 2025-07-26 DIAGNOSIS — I10 ESSENTIAL HYPERTENSION: ICD-10-CM

## 2025-07-26 DIAGNOSIS — E66.811 CLASS 1 OBESITY DUE TO EXCESS CALORIES WITH SERIOUS COMORBIDITY AND BODY MASS INDEX (BMI) OF 30.0 TO 30.9 IN ADULT: ICD-10-CM

## 2025-07-28 NOTE — TELEPHONE ENCOUNTER
Patient calling to report that he is due to inject tomorrow. He confirmed he would like to stay at 10mg dose. Patient is scheduled for follow-up visit on 8/25/25.

## 2025-07-28 NOTE — TELEPHONE ENCOUNTER
Clinic RN: Please investigate patient's chart or contact patient if the information cannot be found because touch base with him regarding zepbound dose, looks like he might be due for a visit with Dr. Domínguez as well.    Jennifer Almeida RN on 7/28/2025 at 11:28 AM

## 2025-07-29 RX ORDER — TIRZEPATIDE 10 MG/.5ML
INJECTION, SOLUTION SUBCUTANEOUS
Qty: 2 ML | Refills: 5 | Status: SHIPPED | OUTPATIENT
Start: 2025-07-29 | End: 2025-07-29

## 2025-07-29 NOTE — TELEPHONE ENCOUNTER
Spoke with patient - Patient states his preferred pharmacy is [facility], not [facility]. Patient is requesting pharmacy change to [address].

## 2025-07-29 NOTE — CONFIDENTIAL NOTE
Refill sent to pharmacy.  It looks like he wanted it sent to the [facility].  Please confirm

## 2025-08-11 ENCOUNTER — OFFICE VISIT (OUTPATIENT)
Dept: FAMILY MEDICINE | Facility: CLINIC | Age: 65
End: 2025-08-11
Payer: COMMERCIAL

## 2025-08-11 ENCOUNTER — PATIENT OUTREACH (OUTPATIENT)
Dept: CARE COORDINATION | Facility: CLINIC | Age: 65
End: 2025-08-11

## 2025-08-11 VITALS
OXYGEN SATURATION: 97 % | DIASTOLIC BLOOD PRESSURE: 97 MMHG | BODY MASS INDEX: 27.26 KG/M2 | TEMPERATURE: 97.7 F | WEIGHT: 190.44 LBS | HEIGHT: 70 IN | SYSTOLIC BLOOD PRESSURE: 143 MMHG | RESPIRATION RATE: 16 BRPM | HEART RATE: 68 BPM

## 2025-08-11 DIAGNOSIS — E78.5 HYPERLIPIDEMIA LDL GOAL <100: ICD-10-CM

## 2025-08-11 DIAGNOSIS — I10 ESSENTIAL HYPERTENSION: ICD-10-CM

## 2025-08-11 DIAGNOSIS — E66.3 OVERWEIGHT WITH BODY MASS INDEX (BMI) OF 27 TO 27.9 IN ADULT: Primary | ICD-10-CM

## 2025-08-11 DIAGNOSIS — R93.1 ELEVATED CORONARY ARTERY CALCIUM SCORE: ICD-10-CM

## 2025-08-11 PROCEDURE — 3080F DIAST BP >= 90 MM HG: CPT | Performed by: FAMILY MEDICINE

## 2025-08-11 PROCEDURE — 99213 OFFICE O/P EST LOW 20 MIN: CPT | Performed by: FAMILY MEDICINE

## 2025-08-11 PROCEDURE — G2211 COMPLEX E/M VISIT ADD ON: HCPCS | Performed by: FAMILY MEDICINE

## 2025-08-11 PROCEDURE — 3077F SYST BP >= 140 MM HG: CPT | Performed by: FAMILY MEDICINE

## 2025-08-11 RX ORDER — LISINOPRIL 10 MG/1
10 TABLET ORAL DAILY
Qty: 30 TABLET | Refills: 2 | Status: SHIPPED | OUTPATIENT
Start: 2025-08-11